# Patient Record
Sex: FEMALE | Race: WHITE | URBAN - METROPOLITAN AREA
[De-identification: names, ages, dates, MRNs, and addresses within clinical notes are randomized per-mention and may not be internally consistent; named-entity substitution may affect disease eponyms.]

---

## 2017-08-04 ENCOUNTER — OUTPATIENT (OUTPATIENT)
Dept: OUTPATIENT SERVICES | Facility: HOSPITAL | Age: 72
LOS: 1 days | Discharge: HOME | End: 2017-08-04

## 2017-08-04 DIAGNOSIS — M06.9 RHEUMATOID ARTHRITIS, UNSPECIFIED: ICD-10-CM

## 2017-08-04 DIAGNOSIS — M54.9 DORSALGIA, UNSPECIFIED: ICD-10-CM

## 2017-08-04 DIAGNOSIS — R07.9 CHEST PAIN, UNSPECIFIED: ICD-10-CM

## 2017-11-25 ENCOUNTER — EMERGENCY (EMERGENCY)
Facility: HOSPITAL | Age: 72
LOS: 0 days | Discharge: HOME | End: 2017-11-25

## 2017-11-25 DIAGNOSIS — Z88.5 ALLERGY STATUS TO NARCOTIC AGENT: ICD-10-CM

## 2017-11-25 DIAGNOSIS — Z79.899 OTHER LONG TERM (CURRENT) DRUG THERAPY: ICD-10-CM

## 2017-11-25 DIAGNOSIS — M06.9 RHEUMATOID ARTHRITIS, UNSPECIFIED: ICD-10-CM

## 2017-11-25 DIAGNOSIS — R07.9 CHEST PAIN, UNSPECIFIED: ICD-10-CM

## 2017-11-25 DIAGNOSIS — R10.84 GENERALIZED ABDOMINAL PAIN: ICD-10-CM

## 2017-11-25 DIAGNOSIS — K56.609 UNSPECIFIED INTESTINAL OBSTRUCTION, UNSPECIFIED AS TO PARTIAL VERSUS COMPLETE OBSTRUCTION: ICD-10-CM

## 2018-04-23 ENCOUNTER — LABORATORY RESULT (OUTPATIENT)
Age: 73
End: 2018-04-23

## 2018-04-23 ENCOUNTER — OUTPATIENT (OUTPATIENT)
Dept: OUTPATIENT SERVICES | Facility: HOSPITAL | Age: 73
LOS: 1 days | Discharge: HOME | End: 2018-04-23

## 2018-04-23 ENCOUNTER — APPOINTMENT (OUTPATIENT)
Dept: HEMATOLOGY ONCOLOGY | Facility: CLINIC | Age: 73
End: 2018-04-23

## 2018-04-23 VITALS
SYSTOLIC BLOOD PRESSURE: 157 MMHG | HEIGHT: 65 IN | RESPIRATION RATE: 14 BRPM | TEMPERATURE: 97.1 F | DIASTOLIC BLOOD PRESSURE: 92 MMHG | WEIGHT: 148 LBS | HEART RATE: 79 BPM | BODY MASS INDEX: 24.66 KG/M2

## 2018-04-23 DIAGNOSIS — D72.829 ELEVATED WHITE BLOOD CELL COUNT, UNSPECIFIED: ICD-10-CM

## 2018-04-23 DIAGNOSIS — Z72.0 TOBACCO USE: ICD-10-CM

## 2018-04-23 DIAGNOSIS — Z78.9 OTHER SPECIFIED HEALTH STATUS: ICD-10-CM

## 2018-04-23 LAB
HCT VFR BLD CALC: 40.5 %
HGB BLD-MCNC: 13.4 G/DL
MCHC RBC-ENTMCNC: 33 PG
MCHC RBC-ENTMCNC: 33.1 G/DL
MCV RBC AUTO: 99.8 FL
PLATELET # BLD AUTO: 168 K/UL
PMV BLD: 12.1 FL
RBC # BLD: 4.06 M/UL
RBC # FLD: 13.5 %
WBC # FLD AUTO: 9.95 K/UL

## 2018-04-24 DIAGNOSIS — D72.829 ELEVATED WHITE BLOOD CELL COUNT, UNSPECIFIED: ICD-10-CM

## 2018-04-24 LAB
APPEARANCE: ABNORMAL
BILIRUBIN URINE: NEGATIVE
BLOOD URINE: ABNORMAL
COLOR: NORMAL
ERYTHROCYTE [SEDIMENTATION RATE] IN BLOOD BY WESTERGREN METHOD: 20 MM/HR
GLUCOSE QUALITATIVE U: NEGATIVE MG/DL
KETONES URINE: NEGATIVE
LDH SERPL-CCNC: 225
LEUKOCYTE ESTERASE URINE: ABNORMAL
NITRITE URINE: POSITIVE
PH URINE: 5.5
PROTEIN URINE: ABNORMAL MG/DL
SPECIFIC GRAVITY URINE: 1.02
UROBILINOGEN URINE: 0.2 MG/DL (ref 0.2–?)

## 2018-04-24 RX ORDER — METHOTREXATE 2.5 MG/1
TABLET ORAL
Refills: 0 | Status: ACTIVE | COMMUNITY

## 2018-08-31 ENCOUNTER — APPOINTMENT (OUTPATIENT)
Dept: OTOLARYNGOLOGY | Facility: CLINIC | Age: 73
End: 2018-08-31

## 2018-11-24 ENCOUNTER — EMERGENCY (EMERGENCY)
Facility: HOSPITAL | Age: 73
LOS: 0 days | Discharge: HOME | End: 2018-11-24
Attending: EMERGENCY MEDICINE | Admitting: EMERGENCY MEDICINE

## 2018-11-24 VITALS
WEIGHT: 136.03 LBS | HEART RATE: 98 BPM | DIASTOLIC BLOOD PRESSURE: 78 MMHG | TEMPERATURE: 98 F | RESPIRATION RATE: 20 BRPM | SYSTOLIC BLOOD PRESSURE: 175 MMHG | OXYGEN SATURATION: 100 %

## 2018-11-24 VITALS
RESPIRATION RATE: 18 BRPM | HEART RATE: 80 BPM | DIASTOLIC BLOOD PRESSURE: 80 MMHG | OXYGEN SATURATION: 97 % | SYSTOLIC BLOOD PRESSURE: 150 MMHG

## 2018-11-24 DIAGNOSIS — Z90.49 ACQUIRED ABSENCE OF OTHER SPECIFIED PARTS OF DIGESTIVE TRACT: ICD-10-CM

## 2018-11-24 DIAGNOSIS — N28.1 CYST OF KIDNEY, ACQUIRED: ICD-10-CM

## 2018-11-24 DIAGNOSIS — R10.13 EPIGASTRIC PAIN: ICD-10-CM

## 2018-11-24 LAB
ALBUMIN SERPL ELPH-MCNC: 4.6 G/DL — SIGNIFICANT CHANGE UP (ref 3.5–5.2)
ALP SERPL-CCNC: 91 U/L — SIGNIFICANT CHANGE UP (ref 30–115)
ALT FLD-CCNC: 15 U/L — SIGNIFICANT CHANGE UP (ref 0–41)
ANION GAP SERPL CALC-SCNC: 14 MMOL/L — SIGNIFICANT CHANGE UP (ref 7–14)
APPEARANCE UR: CLEAR — SIGNIFICANT CHANGE UP
AST SERPL-CCNC: 17 U/L — SIGNIFICANT CHANGE UP (ref 0–41)
BACTERIA # UR AUTO: ABNORMAL
BASOPHILS # BLD AUTO: 0.04 K/UL — SIGNIFICANT CHANGE UP (ref 0–0.2)
BASOPHILS NFR BLD AUTO: 0.4 % — SIGNIFICANT CHANGE UP (ref 0–1)
BILIRUB DIRECT SERPL-MCNC: <0.2 MG/DL — SIGNIFICANT CHANGE UP (ref 0–0.2)
BILIRUB INDIRECT FLD-MCNC: SIGNIFICANT CHANGE UP MG/DL (ref 0.2–1.2)
BILIRUB SERPL-MCNC: <0.2 MG/DL — SIGNIFICANT CHANGE UP (ref 0.2–1.2)
BILIRUB UR-MCNC: NEGATIVE — SIGNIFICANT CHANGE UP
BUN SERPL-MCNC: 23 MG/DL — HIGH (ref 10–20)
CALCIUM SERPL-MCNC: 9.5 MG/DL — SIGNIFICANT CHANGE UP (ref 8.5–10.1)
CHLORIDE SERPL-SCNC: 108 MMOL/L — SIGNIFICANT CHANGE UP (ref 98–110)
CO2 SERPL-SCNC: 24 MMOL/L — SIGNIFICANT CHANGE UP (ref 17–32)
COD CRY URNS QL: NEGATIVE — SIGNIFICANT CHANGE UP
COLOR SPEC: YELLOW — SIGNIFICANT CHANGE UP
CREAT SERPL-MCNC: 1.1 MG/DL — SIGNIFICANT CHANGE UP (ref 0.7–1.5)
DIFF PNL FLD: ABNORMAL
EOSINOPHIL # BLD AUTO: 0.15 K/UL — SIGNIFICANT CHANGE UP (ref 0–0.7)
EOSINOPHIL NFR BLD AUTO: 1.4 % — SIGNIFICANT CHANGE UP (ref 0–8)
EPI CELLS # UR: ABNORMAL /HPF
GLUCOSE SERPL-MCNC: 109 MG/DL — HIGH (ref 70–99)
GLUCOSE UR QL: NEGATIVE MG/DL — SIGNIFICANT CHANGE UP
GRAN CASTS # UR COMP ASSIST: NEGATIVE — SIGNIFICANT CHANGE UP
HCT VFR BLD CALC: 40.3 % — SIGNIFICANT CHANGE UP (ref 37–47)
HGB BLD-MCNC: 13.5 G/DL — SIGNIFICANT CHANGE UP (ref 12–16)
HYALINE CASTS # UR AUTO: NEGATIVE — SIGNIFICANT CHANGE UP
IMM GRANULOCYTES NFR BLD AUTO: 0.5 % — HIGH (ref 0.1–0.3)
KETONES UR-MCNC: NEGATIVE — SIGNIFICANT CHANGE UP
LACTATE SERPL-SCNC: 1.4 MMOL/L — SIGNIFICANT CHANGE UP (ref 0.5–2.2)
LEUKOCYTE ESTERASE UR-ACNC: ABNORMAL
LIDOCAIN IGE QN: 41 U/L — SIGNIFICANT CHANGE UP (ref 7–60)
LYMPHOCYTES # BLD AUTO: 1.35 K/UL — SIGNIFICANT CHANGE UP (ref 1.2–3.4)
LYMPHOCYTES # BLD AUTO: 12.2 % — LOW (ref 20.5–51.1)
MCHC RBC-ENTMCNC: 33.5 G/DL — SIGNIFICANT CHANGE UP (ref 32–37)
MCHC RBC-ENTMCNC: 33.5 PG — HIGH (ref 27–31)
MCV RBC AUTO: 100 FL — HIGH (ref 81–99)
MONOCYTES # BLD AUTO: 0.44 K/UL — SIGNIFICANT CHANGE UP (ref 0.1–0.6)
MONOCYTES NFR BLD AUTO: 4 % — SIGNIFICANT CHANGE UP (ref 1.7–9.3)
NEUTROPHILS # BLD AUTO: 9.02 K/UL — HIGH (ref 1.4–6.5)
NEUTROPHILS NFR BLD AUTO: 81.5 % — HIGH (ref 42.2–75.2)
NITRITE UR-MCNC: NEGATIVE — SIGNIFICANT CHANGE UP
NRBC # BLD: 0 /100 WBCS — SIGNIFICANT CHANGE UP (ref 0–0)
PH UR: 6 — SIGNIFICANT CHANGE UP (ref 5–8)
PLATELET # BLD AUTO: 176 K/UL — SIGNIFICANT CHANGE UP (ref 130–400)
POTASSIUM SERPL-MCNC: 4.2 MMOL/L — SIGNIFICANT CHANGE UP (ref 3.5–5)
POTASSIUM SERPL-SCNC: 4.2 MMOL/L — SIGNIFICANT CHANGE UP (ref 3.5–5)
PROT SERPL-MCNC: 7 G/DL — SIGNIFICANT CHANGE UP (ref 6–8)
PROT UR-MCNC: NEGATIVE MG/DL — SIGNIFICANT CHANGE UP
RBC # BLD: 4.03 M/UL — LOW (ref 4.2–5.4)
RBC # FLD: 13.2 % — SIGNIFICANT CHANGE UP (ref 11.5–14.5)
RBC CASTS # UR COMP ASSIST: ABNORMAL /HPF
SODIUM SERPL-SCNC: 146 MMOL/L — SIGNIFICANT CHANGE UP (ref 135–146)
SP GR SPEC: 1.01 — SIGNIFICANT CHANGE UP (ref 1.01–1.03)
TRI-PHOS CRY UR QL COMP ASSIST: NEGATIVE — SIGNIFICANT CHANGE UP
URATE CRY FLD QL MICRO: NEGATIVE — SIGNIFICANT CHANGE UP
UROBILINOGEN FLD QL: 0.2 MG/DL — SIGNIFICANT CHANGE UP (ref 0.2–0.2)
WBC # BLD: 11.05 K/UL — HIGH (ref 4.8–10.8)
WBC # FLD AUTO: 11.05 K/UL — HIGH (ref 4.8–10.8)
WBC UR QL: SIGNIFICANT CHANGE UP /HPF

## 2018-11-24 RX ORDER — KETOROLAC TROMETHAMINE 30 MG/ML
15 SYRINGE (ML) INJECTION ONCE
Qty: 0 | Refills: 0 | Status: DISCONTINUED | OUTPATIENT
Start: 2018-11-24 | End: 2018-11-24

## 2018-11-24 RX ORDER — ACETAMINOPHEN 500 MG
975 TABLET ORAL ONCE
Qty: 0 | Refills: 0 | Status: COMPLETED | OUTPATIENT
Start: 2018-11-24 | End: 2018-11-24

## 2018-11-24 RX ORDER — IBUPROFEN 200 MG
600 TABLET ORAL ONCE
Qty: 0 | Refills: 0 | Status: COMPLETED | OUTPATIENT
Start: 2018-11-24 | End: 2018-11-24

## 2018-11-24 RX ADMIN — Medication 15 MILLIGRAM(S): at 03:27

## 2018-11-24 RX ADMIN — Medication 600 MILLIGRAM(S): at 05:17

## 2018-11-24 RX ADMIN — Medication 975 MILLIGRAM(S): at 05:17

## 2018-11-24 NOTE — ED ADULT NURSE NOTE - NSIMPLEMENTINTERV_GEN_ALL_ED
Implemented All Universal Safety Interventions:  [place] to call system. Call bell, personal items and telephone within reach. Instruct patient to call for assistance. Room bathroom lighting operational. Non-slip footwear when patient is off stretcher. Physically safe environment: no spills, clutter or unnecessary equipment. Stretcher in lowest position, wheels locked, appropriate side rails in place.

## 2018-11-24 NOTE — ED PROVIDER NOTE - PHYSICAL EXAMINATION
CONSTITUTIONAL: Well-appearing; well-nourished; in no apparent distress.   NECK: Supple; non-tender; no cervical lymphadenopathy. No JVD.   CARDIOVASCULAR: Normal S1, S2; no murmurs, rubs, or gallops.   RESPIRATORY: Normal chest excursion with respiration; breath sounds clear and equal bilaterally; no wheezes, rhonchi, or rales.  GI/: Normal bowel sounds; non-distended; non-tender; no palpable organomegaly.   MS: + right sided CVA ttp. No evidence of trauma or deformity. Normal ROM in all four extremities; non-tender to palpation  SKIN: Normal for age and race; warm; dry; good turgor; no apparent lesions or exudate.   NEURO/PSYCH: A & O x 4; grossly unremarkable. mood and manner are appropriate. Grooming and personal hygiene are appropriate.

## 2018-11-24 NOTE — ED PROVIDER NOTE - MEDICAL DECISION MAKING DETAILS
Patient has r sided kidney mass on CT. Patient given urology follow-up to [facility] an appointment to be seen next week for further work-up of hematuria. Patient agreed with outpatient treatment plan. I have full discussed the medical management and delivery of care with the patient. Patient confirms understanding and has been given detailed return precautions. Patient instructed to return to the ED should symptoms persist or worsen. Patient is well appearing upon discharge.

## 2018-11-24 NOTE — ED PROVIDER NOTE - OBJECTIVE STATEMENT
73 year old female with pmhx of RA on methotrexate, cholecystectomy c/o right flank pain radiating to RUQ/epigastric pain and hematuria x 3 days. + weight loss 7 lbs over 3 months. No n/v, fever/chills, dysuria, diarrhea, chest pain, sob, rectal bleeding.

## 2018-11-24 NOTE — ED PROVIDER NOTE - NS ED ROS FT
Constitutional: no fever, chills, no recent weight loss, change in appetite or malaise  Cardiac: No chest pain, SOB or edema.  Respiratory: No cough or respiratory distress  GI: + right flank pain radiating to ruq. No n/v/d  : + hematuria. No dysuria, frequency, urgency   MS: no pain to back or extremities, no loss of ROM, no weakness  Neuro: No headache or weakness. No LOC.  Skin: No skin rash.  Except as documented in the HPI, all other systems are negative.

## 2018-11-24 NOTE — ED PROVIDER NOTE - ATTENDING CONTRIBUTION TO CARE
I personally evaluated the patient. I reviewed the Resident’s or Physician Assistant’s note (as assigned above), and agree with the findings and plan except as documented in my note.    73 year old female with pmhx of RA on methotrexate presents with flank pain and lower abd pain associated with hematuria over the past 2 days. No fevers, chills. No n/v. No diarrhea. No bloody stools.     CONSTITUTIONAL: Well-developed; well-nourished; in no acute distress. Sitting up and providing appropriate history and physical examination  SKIN: skin exam is warm and dry, no acute rash.  HEAD: Normocephalic; atraumatic.  EYES: PERRL, 3 mm bilateral, no nystagmus, EOM intact; conjunctiva and sclera clear.  ENT: No nasal discharge; airway clear.  NECK: Supple; non tender.+ full passive ROM in all directions. No JVD  CARD: S1, S2 normal; no murmurs, gallops, or rubs. Regular rate and rhythm. + Symmetric Strong Pulses  RESP: No wheezes, rales or rhonchi. Good air movement bilaterally  ABD: soft; non-distended; non-tender. No Rebound, No Gaurding, No signs of peritnitis, No CVA tenderness  EXT: Normal ROM. No clubbing, cyanosis or edema. Dp and Pt Pulses intact. Cap refill less than 3 seconds  NEURO: CN 2-12 intact, normal finger to nose, normal romberg, stable gait, no sensory or motor deficits, Alert, oriented, grossly unremarkable. No Focal deficits. GCS 15. NIH 0  PSYCH: Cooperative, appropriate.    Plan: labs, UA, CT abd/pelvis

## 2018-12-10 ENCOUNTER — OUTPATIENT (OUTPATIENT)
Dept: OUTPATIENT SERVICES | Facility: HOSPITAL | Age: 73
LOS: 1 days | Discharge: HOME | End: 2018-12-10

## 2018-12-10 VITALS
HEIGHT: 65 IN | DIASTOLIC BLOOD PRESSURE: 58 MMHG | OXYGEN SATURATION: 97 % | RESPIRATION RATE: 15 BRPM | WEIGHT: 139.33 LBS | SYSTOLIC BLOOD PRESSURE: 104 MMHG | TEMPERATURE: 97 F | HEART RATE: 60 BPM

## 2018-12-10 DIAGNOSIS — Z01.818 ENCOUNTER FOR OTHER PREPROCEDURAL EXAMINATION: ICD-10-CM

## 2018-12-10 DIAGNOSIS — M06.9 RHEUMATOID ARTHRITIS, UNSPECIFIED: ICD-10-CM

## 2018-12-10 DIAGNOSIS — C67.9 MALIGNANT NEOPLASM OF BLADDER, UNSPECIFIED: ICD-10-CM

## 2018-12-10 DIAGNOSIS — N83.209 UNSPECIFIED OVARIAN CYST, UNSPECIFIED SIDE: Chronic | ICD-10-CM

## 2018-12-10 DIAGNOSIS — Z96.641 PRESENCE OF RIGHT ARTIFICIAL HIP JOINT: Chronic | ICD-10-CM

## 2018-12-10 DIAGNOSIS — Z90.49 ACQUIRED ABSENCE OF OTHER SPECIFIED PARTS OF DIGESTIVE TRACT: Chronic | ICD-10-CM

## 2018-12-10 DIAGNOSIS — Z98.41 CATARACT EXTRACTION STATUS, RIGHT EYE: Chronic | ICD-10-CM

## 2018-12-10 LAB
ALBUMIN SERPL ELPH-MCNC: 3.8 G/DL — SIGNIFICANT CHANGE UP (ref 3.5–5.2)
ALP SERPL-CCNC: 80 U/L — SIGNIFICANT CHANGE UP (ref 30–115)
ALT FLD-CCNC: 26 U/L — SIGNIFICANT CHANGE UP (ref 0–41)
ANION GAP SERPL CALC-SCNC: 19 MMOL/L — HIGH (ref 7–14)
APPEARANCE UR: ABNORMAL
APTT BLD: 28.2 SEC — SIGNIFICANT CHANGE UP (ref 27–39.2)
AST SERPL-CCNC: 17 U/L — SIGNIFICANT CHANGE UP (ref 0–41)
BACTERIA # UR AUTO: ABNORMAL /HPF
BILIRUB SERPL-MCNC: 0.7 MG/DL — SIGNIFICANT CHANGE UP (ref 0.2–1.2)
BILIRUB UR-MCNC: ABNORMAL
BUN SERPL-MCNC: 19 MG/DL — SIGNIFICANT CHANGE UP (ref 10–20)
CALCIUM SERPL-MCNC: 9.2 MG/DL — SIGNIFICANT CHANGE UP (ref 8.5–10.1)
CHLORIDE SERPL-SCNC: 98 MMOL/L — SIGNIFICANT CHANGE UP (ref 98–110)
CO2 SERPL-SCNC: 24 MMOL/L — SIGNIFICANT CHANGE UP (ref 17–32)
COLOR SPEC: SIGNIFICANT CHANGE UP
CREAT SERPL-MCNC: 1.4 MG/DL — SIGNIFICANT CHANGE UP (ref 0.7–1.5)
DIFF PNL FLD: ABNORMAL
EPI CELLS # UR: ABNORMAL /HPF
GLUCOSE SERPL-MCNC: 130 MG/DL — HIGH (ref 70–99)
GLUCOSE UR QL: NEGATIVE MG/DL — SIGNIFICANT CHANGE UP
HCT VFR BLD CALC: 35 % — LOW (ref 37–47)
HGB BLD-MCNC: 11.6 G/DL — LOW (ref 12–16)
INR BLD: 1.25 RATIO — SIGNIFICANT CHANGE UP (ref 0.65–1.3)
KETONES UR-MCNC: ABNORMAL
LEUKOCYTE ESTERASE UR-ACNC: ABNORMAL
MCHC RBC-ENTMCNC: 32.1 PG — HIGH (ref 27–31)
MCHC RBC-ENTMCNC: 33.1 G/DL — SIGNIFICANT CHANGE UP (ref 32–37)
MCV RBC AUTO: 97 FL — SIGNIFICANT CHANGE UP (ref 81–99)
NITRITE UR-MCNC: NEGATIVE — SIGNIFICANT CHANGE UP
NRBC # BLD: 0 /100 WBCS — SIGNIFICANT CHANGE UP (ref 0–0)
PH UR: 6 — SIGNIFICANT CHANGE UP (ref 5–8)
PLATELET # BLD AUTO: 132 K/UL — SIGNIFICANT CHANGE UP (ref 130–400)
POTASSIUM SERPL-MCNC: 3.4 MMOL/L — LOW (ref 3.5–5)
POTASSIUM SERPL-SCNC: 3.4 MMOL/L — LOW (ref 3.5–5)
PROT SERPL-MCNC: 6.2 G/DL — SIGNIFICANT CHANGE UP (ref 6–8)
PROT UR-MCNC: 100 MG/DL
PROTHROM AB SERPL-ACNC: 14.4 SEC — HIGH (ref 9.95–12.87)
RBC # BLD: 3.61 M/UL — LOW (ref 4.2–5.4)
RBC # FLD: 13.2 % — SIGNIFICANT CHANGE UP (ref 11.5–14.5)
RBC CASTS # UR COMP ASSIST: ABNORMAL /HPF
SODIUM SERPL-SCNC: 141 MMOL/L — SIGNIFICANT CHANGE UP (ref 135–146)
SP GR SPEC: 1.02 — SIGNIFICANT CHANGE UP (ref 1.01–1.03)
UROBILINOGEN FLD QL: 1 MG/DL (ref 0.2–0.2)
WBC # BLD: 14.89 K/UL — HIGH (ref 4.8–10.8)
WBC # FLD AUTO: 14.89 K/UL — HIGH (ref 4.8–10.8)
WBC UR QL: >50 /HPF

## 2018-12-10 NOTE — H&P PST ADULT - NSANTHOSAYNRD_GEN_A_CORE
No. CARL screening performed.  STOP BANG Legend: 0-2 = LOW Risk; 3-4 = INTERMEDIATE Risk; 5-8 = HIGH Risk/SEE CARL TOOL

## 2018-12-10 NOTE — H&P PST ADULT - ATTENDING COMMENTS
pt with gross hematuria admitted for cysto bladder biopsy fulguration pt explained risks of procedure including bleeding infection bladder perforation for or today

## 2018-12-10 NOTE — H&P PST ADULT - HISTORY OF PRESENT ILLNESS
73 YEAR OLD FEMALE WITH A SMALL AREA IN BLADDER FOUND ON CYSTOSCOPE AFTER GOING TO THE DOCTOR WITH BLEEDING  FOS=2  DENIES CHEST PAIN SOB PALP  DENIES RECENT URI OR UTI

## 2018-12-10 NOTE — H&P PST ADULT - PSH
Benign ovarian cyst  RIGHT OVARY REMOVED  H/O bilateral cataract extraction    History of appendectomy    History of cholecystectomy    History of hip replacement, total, right  1998

## 2018-12-17 ENCOUNTER — RESULT REVIEW (OUTPATIENT)
Age: 73
End: 2018-12-17

## 2018-12-17 ENCOUNTER — OUTPATIENT (OUTPATIENT)
Dept: OUTPATIENT SERVICES | Facility: HOSPITAL | Age: 73
LOS: 1 days | Discharge: HOME | End: 2018-12-17

## 2018-12-17 VITALS
DIASTOLIC BLOOD PRESSURE: 65 MMHG | SYSTOLIC BLOOD PRESSURE: 140 MMHG | HEART RATE: 70 BPM | RESPIRATION RATE: 16 BRPM | TEMPERATURE: 98 F | WEIGHT: 138.01 LBS | HEIGHT: 65 IN

## 2018-12-17 VITALS
HEART RATE: 74 BPM | OXYGEN SATURATION: 97 % | RESPIRATION RATE: 18 BRPM | DIASTOLIC BLOOD PRESSURE: 80 MMHG | SYSTOLIC BLOOD PRESSURE: 145 MMHG

## 2018-12-17 DIAGNOSIS — Z96.641 PRESENCE OF RIGHT ARTIFICIAL HIP JOINT: Chronic | ICD-10-CM

## 2018-12-17 DIAGNOSIS — Z90.49 ACQUIRED ABSENCE OF OTHER SPECIFIED PARTS OF DIGESTIVE TRACT: Chronic | ICD-10-CM

## 2018-12-17 DIAGNOSIS — N83.209 UNSPECIFIED OVARIAN CYST, UNSPECIFIED SIDE: Chronic | ICD-10-CM

## 2018-12-17 DIAGNOSIS — Z98.41 CATARACT EXTRACTION STATUS, RIGHT EYE: Chronic | ICD-10-CM

## 2018-12-17 RX ORDER — OXYCODONE AND ACETAMINOPHEN 5; 325 MG/1; MG/1
2 TABLET ORAL ONCE
Qty: 0 | Refills: 0 | Status: DISCONTINUED | OUTPATIENT
Start: 2018-12-17 | End: 2018-12-17

## 2018-12-17 RX ORDER — HYDROMORPHONE HYDROCHLORIDE 2 MG/ML
0.5 INJECTION INTRAMUSCULAR; INTRAVENOUS; SUBCUTANEOUS
Qty: 0 | Refills: 0 | Status: DISCONTINUED | OUTPATIENT
Start: 2018-12-17 | End: 2018-12-17

## 2018-12-17 RX ORDER — SODIUM CHLORIDE 9 MG/ML
1000 INJECTION, SOLUTION INTRAVENOUS
Qty: 0 | Refills: 0 | Status: DISCONTINUED | OUTPATIENT
Start: 2018-12-17 | End: 2018-12-17

## 2018-12-17 RX ORDER — HYDROMORPHONE HYDROCHLORIDE 2 MG/ML
1 INJECTION INTRAMUSCULAR; INTRAVENOUS; SUBCUTANEOUS
Qty: 0 | Refills: 0 | Status: DISCONTINUED | OUTPATIENT
Start: 2018-12-17 | End: 2018-12-17

## 2018-12-17 RX ORDER — ONDANSETRON 8 MG/1
4 TABLET, FILM COATED ORAL ONCE
Qty: 0 | Refills: 0 | Status: DISCONTINUED | OUTPATIENT
Start: 2018-12-17 | End: 2018-12-17

## 2018-12-17 RX ADMIN — SODIUM CHLORIDE 125 MILLILITER(S): 9 INJECTION, SOLUTION INTRAVENOUS at 14:52

## 2018-12-17 NOTE — PRE-ANESTHESIA EVALUATION ADULT - NSANTHADDINFOFT_GEN_ALL_CORE
74 y/o WF evaluated for cystoscopy.  ASA 3.  Plan GA.  Plan, benefits, foreseeable risks, viable alternatives discussed with patient and all her pertinent questions answered and she understands and elects to proceed.

## 2018-12-17 NOTE — BRIEF OPERATIVE NOTE - PROCEDURE
<<-----Click on this checkbox to enter Procedure Cystoscopy  12/17/2018  bladder biopsies fulguration  Active  [id number]

## 2018-12-26 LAB — SURGICAL PATHOLOGY STUDY: SIGNIFICANT CHANGE UP

## 2018-12-27 DIAGNOSIS — D49.4 NEOPLASM OF UNSPECIFIED BEHAVIOR OF BLADDER: ICD-10-CM

## 2018-12-27 DIAGNOSIS — F17.210 NICOTINE DEPENDENCE, CIGARETTES, UNCOMPLICATED: ICD-10-CM

## 2018-12-27 DIAGNOSIS — C67.9 MALIGNANT NEOPLASM OF BLADDER, UNSPECIFIED: ICD-10-CM

## 2019-08-24 ENCOUNTER — OUTPATIENT (OUTPATIENT)
Dept: OUTPATIENT SERVICES | Facility: HOSPITAL | Age: 74
LOS: 1 days | Discharge: HOME | End: 2019-08-24
Payer: MEDICARE

## 2019-08-24 DIAGNOSIS — M79.671 PAIN IN RIGHT FOOT: ICD-10-CM

## 2019-08-24 DIAGNOSIS — Z90.49 ACQUIRED ABSENCE OF OTHER SPECIFIED PARTS OF DIGESTIVE TRACT: Chronic | ICD-10-CM

## 2019-08-24 DIAGNOSIS — N83.209 UNSPECIFIED OVARIAN CYST, UNSPECIFIED SIDE: Chronic | ICD-10-CM

## 2019-08-24 DIAGNOSIS — Z98.41 CATARACT EXTRACTION STATUS, RIGHT EYE: Chronic | ICD-10-CM

## 2019-08-24 DIAGNOSIS — Z96.641 PRESENCE OF RIGHT ARTIFICIAL HIP JOINT: Chronic | ICD-10-CM

## 2019-08-24 PROBLEM — M06.9 RHEUMATOID ARTHRITIS, UNSPECIFIED: Chronic | Status: ACTIVE | Noted: 2018-12-10

## 2019-08-24 PROCEDURE — 73630 X-RAY EXAM OF FOOT: CPT | Mod: 26,RT

## 2021-05-06 ENCOUNTER — INPATIENT (INPATIENT)
Facility: HOSPITAL | Age: 76
LOS: 0 days | Discharge: HOME | End: 2021-05-07
Attending: INTERNAL MEDICINE | Admitting: INTERNAL MEDICINE
Payer: MEDICARE

## 2021-05-06 VITALS
HEIGHT: 65 IN | SYSTOLIC BLOOD PRESSURE: 147 MMHG | DIASTOLIC BLOOD PRESSURE: 67 MMHG | TEMPERATURE: 97 F | WEIGHT: 141.98 LBS | HEART RATE: 93 BPM | OXYGEN SATURATION: 99 % | RESPIRATION RATE: 20 BRPM

## 2021-05-06 DIAGNOSIS — Z90.49 ACQUIRED ABSENCE OF OTHER SPECIFIED PARTS OF DIGESTIVE TRACT: Chronic | ICD-10-CM

## 2021-05-06 DIAGNOSIS — N83.209 UNSPECIFIED OVARIAN CYST, UNSPECIFIED SIDE: Chronic | ICD-10-CM

## 2021-05-06 DIAGNOSIS — Z98.41 CATARACT EXTRACTION STATUS, RIGHT EYE: Chronic | ICD-10-CM

## 2021-05-06 DIAGNOSIS — Z96.641 PRESENCE OF RIGHT ARTIFICIAL HIP JOINT: Chronic | ICD-10-CM

## 2021-05-06 LAB
ALBUMIN SERPL ELPH-MCNC: 4.4 G/DL — SIGNIFICANT CHANGE UP (ref 3.5–5.2)
ALP SERPL-CCNC: 81 U/L — SIGNIFICANT CHANGE UP (ref 30–115)
ALT FLD-CCNC: 14 U/L — SIGNIFICANT CHANGE UP (ref 0–41)
ANION GAP SERPL CALC-SCNC: 10 MMOL/L — SIGNIFICANT CHANGE UP (ref 7–14)
AST SERPL-CCNC: 19 U/L — SIGNIFICANT CHANGE UP (ref 0–41)
BASOPHILS # BLD AUTO: 0.02 K/UL — SIGNIFICANT CHANGE UP (ref 0–0.2)
BASOPHILS NFR BLD AUTO: 0.3 % — SIGNIFICANT CHANGE UP (ref 0–1)
BILIRUB SERPL-MCNC: 0.2 MG/DL — SIGNIFICANT CHANGE UP (ref 0.2–1.2)
BUN SERPL-MCNC: 22 MG/DL — HIGH (ref 10–20)
CALCIUM SERPL-MCNC: 9.8 MG/DL — SIGNIFICANT CHANGE UP (ref 8.5–10.1)
CHLORIDE SERPL-SCNC: 109 MMOL/L — SIGNIFICANT CHANGE UP (ref 98–110)
CO2 SERPL-SCNC: 26 MMOL/L — SIGNIFICANT CHANGE UP (ref 17–32)
CREAT SERPL-MCNC: 1.2 MG/DL — SIGNIFICANT CHANGE UP (ref 0.7–1.5)
EOSINOPHIL # BLD AUTO: 0.13 K/UL — SIGNIFICANT CHANGE UP (ref 0–0.7)
EOSINOPHIL NFR BLD AUTO: 1.7 % — SIGNIFICANT CHANGE UP (ref 0–8)
GLUCOSE SERPL-MCNC: 110 MG/DL — HIGH (ref 70–99)
HCT VFR BLD CALC: 35.7 % — LOW (ref 37–47)
HGB BLD-MCNC: 12.1 G/DL — SIGNIFICANT CHANGE UP (ref 12–16)
IMM GRANULOCYTES NFR BLD AUTO: 0.3 % — SIGNIFICANT CHANGE UP (ref 0.1–0.3)
LYMPHOCYTES # BLD AUTO: 1.42 K/UL — SIGNIFICANT CHANGE UP (ref 1.2–3.4)
LYMPHOCYTES # BLD AUTO: 19.1 % — LOW (ref 20.5–51.1)
MCHC RBC-ENTMCNC: 33.5 PG — HIGH (ref 27–31)
MCHC RBC-ENTMCNC: 33.9 G/DL — SIGNIFICANT CHANGE UP (ref 32–37)
MCV RBC AUTO: 98.9 FL — SIGNIFICANT CHANGE UP (ref 81–99)
MONOCYTES # BLD AUTO: 0.38 K/UL — SIGNIFICANT CHANGE UP (ref 0.1–0.6)
MONOCYTES NFR BLD AUTO: 5.1 % — SIGNIFICANT CHANGE UP (ref 1.7–9.3)
NEUTROPHILS # BLD AUTO: 5.48 K/UL — SIGNIFICANT CHANGE UP (ref 1.4–6.5)
NEUTROPHILS NFR BLD AUTO: 73.5 % — SIGNIFICANT CHANGE UP (ref 42.2–75.2)
NRBC # BLD: 0 /100 WBCS — SIGNIFICANT CHANGE UP (ref 0–0)
PLATELET # BLD AUTO: 165 K/UL — SIGNIFICANT CHANGE UP (ref 130–400)
POTASSIUM SERPL-MCNC: 3.9 MMOL/L — SIGNIFICANT CHANGE UP (ref 3.5–5)
POTASSIUM SERPL-SCNC: 3.9 MMOL/L — SIGNIFICANT CHANGE UP (ref 3.5–5)
PROT SERPL-MCNC: 6.8 G/DL — SIGNIFICANT CHANGE UP (ref 6–8)
RAPID RVP RESULT: SIGNIFICANT CHANGE UP
RBC # BLD: 3.61 M/UL — LOW (ref 4.2–5.4)
RBC # FLD: 13.2 % — SIGNIFICANT CHANGE UP (ref 11.5–14.5)
SARS-COV-2 RNA SPEC QL NAA+PROBE: SIGNIFICANT CHANGE UP
SODIUM SERPL-SCNC: 145 MMOL/L — SIGNIFICANT CHANGE UP (ref 135–146)
TROPONIN T SERPL-MCNC: <0.01 NG/ML — SIGNIFICANT CHANGE UP
WBC # BLD: 7.45 K/UL — SIGNIFICANT CHANGE UP (ref 4.8–10.8)
WBC # FLD AUTO: 7.45 K/UL — SIGNIFICANT CHANGE UP (ref 4.8–10.8)

## 2021-05-06 PROCEDURE — 71046 X-RAY EXAM CHEST 2 VIEWS: CPT | Mod: 26

## 2021-05-06 PROCEDURE — 99223 1ST HOSP IP/OBS HIGH 75: CPT

## 2021-05-06 PROCEDURE — 99285 EMERGENCY DEPT VISIT HI MDM: CPT

## 2021-05-06 NOTE — H&P ADULT - NSICDXPASTSURGICALHX_GEN_ALL_CORE_FT
PAST SURGICAL HISTORY:  Benign ovarian cyst RIGHT OVARY REMOVED    H/O bilateral cataract extraction     History of appendectomy     History of cholecystectomy     History of hip replacement, total, right 1998

## 2021-05-06 NOTE — H&P ADULT - ASSESSMENT
yr old female with hx of anxiety presented to ER for chest pain     # Atypical Chest Pain   - symptoms resolved   - trop <0.01 --> f/u AM trop  - EKG: NSR  - tele monitoring   - cardiology f/u    - MTX    # DVT ppx  - start Lovenox     # DASH diet    # Ambulate as tolerated    # Full code 76 yr old female with hx of RA (on MTX), GERD, gastric ulcer  presented to ER for chest pain since yesterday.    # Atypical Chest Pain   - likely dyspepsia; r/o ACS  - asymptomatic at encounter  - pt reports having neg stress test 5 yrs ago with Dr. Zimmerman   - trop <0.01 --> f/u AM trop  - EKG: NSR with PVC  - tele monitoring   - consult cardiology    # Hx of GERD / Gastric Ulcer   - pt not on any GI ppx - takes ibuprofen for RA pain   - start Protonix 40mg daily   - outpt GI f/u Dr. Barnard     # Hx of RA  - c/w MTX  - avoid NSAID for pain   - Tylenol / tramadol prn     # DVT ppx  - start Lovenox     # DASH diet    # Ambulate as tolerated    # Full code 76 yr old female with hx of RA (on MTX), GERD, gastric ulcer  presented to ER for chest pain since yesterday.    # Atypical Chest Pain   - likely dyspepsia; r/o ACS  - asymptomatic at encounter  - pt reports having neg stress test 5 yrs ago with Dr. Zimmerman   - trop <0.01 --> f/u AM trop  - EKG: NSR with PVC  - tele monitoring   - consult cardiology    # Hx of GERD / Gastric Ulcer   - pt not on any GI ppx - takes ibuprofen for RA pain   - start Protonix 40mg daily   - outpt GI f/u Dr. Barnard     # Hx of RA  - c/w MTX  - avoid NSAID for pain   - Tylenol / tramadol prn     # DVT ppx  - start Lovenox     # DASH diet    # Ambulate as tolerated    # Anticipated for discharge     # Full code

## 2021-05-06 NOTE — ED PROVIDER NOTE - ATTENDING CONTRIBUTION TO CARE
I was present for and supervised the key and critical aspects of the procedures performed during the care of the patient. Patient presents for evaluation of chest pain that is moderate and burning in nature with no radiation to the back she denies any fevers or chills she denies any diaphoresis she denies any vomiting. pain initiated at rest.  she had a similar episode 1 month prior that resolved on its own  on physical exam the patient is nc/at perrla eomi oropharynx clear cta b/l, rrr s1s2 noted abd-soft nt nd bs+ ext from with no edema noted   A/P- we obtained ekg labs including troponin which is negative nevertheless given the patients age and history I will admi for further management at this time

## 2021-05-06 NOTE — H&P ADULT - NSICDXFAMILYHX_GEN_ALL_CORE_FT
FAMILY HISTORY:  No pertinent family history in first degree relatives     FAMILY HISTORY:  Father  Still living? Unknown  FHx: pancreatic cancer, Age at diagnosis: Age Unknown

## 2021-05-06 NOTE — ED PROVIDER NOTE - CLINICAL SUMMARY MEDICAL DECISION MAKING FREE TEXT BOX
patient presents for evaluation of chest pain at rest with no sob and no vomiting no diaphoresis we obtained ekg labs including troponin which is negative   I will admit for further management at this time

## 2021-05-06 NOTE — ED PROVIDER NOTE - OBJECTIVE STATEMENT
75 y/o female with hx of RA on methotrexate presents to the ED with intermittent epigastric pain radiating to chest today. patient noted symptoms a few weeks ago which spontaneously resolved. patient denies any diaphoresis , nausea , sob . no fever,chills. patient denies any back pain. no tingling to extremities. patietn with steady gait. no syncope. patient denies any palpitations. patient witnout any prior cardiac history in past.

## 2021-05-06 NOTE — ED PROVIDER NOTE - PHYSICAL EXAMINATION
Vital Signs: I have reviewed the initial vital signs.  Constitutional: well-nourished, no acute distress, normocephalic  Eyes: PERRLA, EOMI, clear conjunctiva  ENT: MMM,   Cardiovascular: regular rate, regular rhythm, no murmur appreciated  Respiratory: unlabored respiratory effort, clear to auscultation bilaterally, no chest wall tenderness  Gastrointestinal: soft, non-tender, non-distended  abdomen, no pulsatile mass  Musculoskeletal: supple neck, no lower extremity edema, no bony tenderness  Integumentary: warm, dry, no rash  Neurologic: awake, alert, cranial nerves II-XII grossly intact, extremities’ motor and sensory functions grossly intact, no focal deficits,

## 2021-05-06 NOTE — ED PROVIDER NOTE - NS ED ROS FT
Review of Systems    Constitutional: (-) fever/ chills (-)loss of appetite   Eyes (-) visual changes  ENT: (-) epistaxis (-) sore throat (-) ear pain  Cardiovascular: (+) chest pain, (-) syncope (-) palpitations  Respiratory: (-) cough, (-) shortness of breath  Gastrointestinal: (-) vomiting, (-) diarrhea (-) abdominal pain  neck: (-) neck pain or stiffness  Musculoskeletal:  (-) back pain, (-) joint pain   Integumentary: (-) rash, (-) swelling  Neurological: (-) headache, (-) altered mental status

## 2021-05-06 NOTE — H&P ADULT - NSHPPHYSICALEXAM_GEN_ALL_CORE
T(C): 35.9 (05-06-21 @ 20:09), Max: 35.9 (05-06-21 @ 20:09)  HR: 88 (05-06-21 @ 22:18) (88 - 93)  BP: 142/63 (05-06-21 @ 22:18) (142/63 - 147/67)  RR: 18 (05-06-21 @ 22:18) (18 - 20)  SpO2: 100% (05-06-21 @ 22:18) (99% - 100%)        GENERAL: NAD, well-developed  HEAD:  Atraumatic, Normocephalic  EYES: EOMI, PERRLA, conjunctiva and sclera clear  ENT: Normal tympanic membrane. No nasal obstruction or discharge. No tonsillar exudate, swelling or erythema.  NECK: Supple, No JVD  CHEST/LUNG: Clear to auscultation bilaterally; No wheeze  HEART: Regular rate and rhythm; No murmurs, rubs, or gallops  ABDOMEN: Soft, Nontender, Nondistended; Bowel sounds present  EXTREMITIES:  2+ Peripheral Pulses, No clubbing, cyanosis, or edema  PSYCH: AAOx3  NEUROLOGY: non-focal  SKIN: No rashes or lesions T(C): 35.9 (05-06-21 @ 20:09), Max: 35.9 (05-06-21 @ 20:09)  HR: 88 (05-06-21 @ 22:18) (88 - 93)  BP: 142/63 (05-06-21 @ 22:18) (142/63 - 147/67)  RR: 18 (05-06-21 @ 22:18) (18 - 20)  SpO2: 100% (05-06-21 @ 22:18) (99% - 100%)    GENERAL: NAD, well-developed  HEAD:  Atraumatic, Normocephalic  EYES: EOMI, PERRLA, conjunctiva and sclera clear  ENT: Normal tympanic membrane. No nasal obstruction or discharge. No tonsillar exudate, swelling or erythema.  NECK: Supple, No JVD  CHEST/LUNG: Clear to auscultation bilaterally; No wheeze  HEART: Regular rate and rhythm; No murmurs, rubs, or gallops  ABDOMEN: Soft, Nontender, Nondistended; Bowel sounds present  EXTREMITIES:  2+ Peripheral Pulses, No clubbing, cyanosis, or edema  PSYCH: AAOx3  NEUROLOGY: non-focal  SKIN: No rashes or lesions

## 2021-05-06 NOTE — H&P ADULT - HISTORY OF PRESENT ILLNESS
IN PROGRESS    76 yr old female with hx of    presented to ER for chest pain     As per patient she suddenly woke up at 2 AM today with palpitation associated with lightheadedness and numbness and tingling of head and upper chest. She also complained of chest pressure and sob during the event.  took vitals at home: bp: 180/103, HR . Her symptoms persisted for 15 mins and resolved on its own. She never had similar symptoms before. Patient can ambulate >2 blocks with no issues. She denies any fever, chills, chest pain, abdominal pain. Lives home with family, social negx3.  76 yr old female with hx of RA (on MTX), GERD, gastric ulcer  presented to ER for chest pain since yesterday. As per patient she suddenly woke up at 1 AM today with central chest pain, continuous non radiating, pain fluctuating from 5 to 9/10 with no aggravating or relieving factors. Her pain persisted throught the night and today all day. Patient reports paitn was worse after haing dinner tonight so presented to ER. She had similar symptoms 1 month before that resolved on its own. Patient can ambulate >3 blocks or climb 2 flights of stairs with no issues. She denies any fever, chills, palpitaion, abdominal pain. Lives home with family, social negx3.

## 2021-05-06 NOTE — H&P ADULT - NSHPSOCIALHISTORY_GEN_ALL_CORE
Marital Status:  (   )    (   ) Single    (   )    (  )   Lives with: (  ) alone  (  ) children   (  ) spouse   (  ) parents  (  ) other  Recent Travel: No recent travel  Occupation:    Substance Use (street drugs): ( x ) never used  (  ) other:  Tobacco Usage:  ( x  ) never smoked   (   ) former smoker   (   ) current smoker  (     ) pack year  Alcohol Usage: None Substance Use (street drugs): ( x ) never used  (  ) other:  Tobacco Usage:  (   ) never smoked   ( X  ) former smoker   (   ) current smoker  (     ) pack year: quit 3 yrs ago - 1 pack a day for 50 yrs   Alcohol Usage: None

## 2021-05-07 ENCOUNTER — TRANSCRIPTION ENCOUNTER (OUTPATIENT)
Age: 76
End: 2021-05-07

## 2021-05-07 VITALS
SYSTOLIC BLOOD PRESSURE: 120 MMHG | RESPIRATION RATE: 16 BRPM | TEMPERATURE: 97 F | DIASTOLIC BLOOD PRESSURE: 60 MMHG | HEART RATE: 72 BPM

## 2021-05-07 LAB
COVID-19 SPIKE DOMAIN AB INTERP: POSITIVE
COVID-19 SPIKE DOMAIN ANTIBODY RESULT: >250 U/ML — HIGH
HCV AB S/CO SERPL IA: 0.04 COI — SIGNIFICANT CHANGE UP
HCV AB SERPL-IMP: SIGNIFICANT CHANGE UP
SARS-COV-2 IGG+IGM SERPL QL IA: >250 U/ML — HIGH
SARS-COV-2 IGG+IGM SERPL QL IA: POSITIVE
TROPONIN T SERPL-MCNC: <0.01 NG/ML — SIGNIFICANT CHANGE UP

## 2021-05-07 PROCEDURE — 99238 HOSP IP/OBS DSCHRG MGMT 30/<: CPT

## 2021-05-07 RX ORDER — PANTOPRAZOLE SODIUM 20 MG/1
40 TABLET, DELAYED RELEASE ORAL
Refills: 0 | Status: DISCONTINUED | OUTPATIENT
Start: 2021-05-07 | End: 2021-05-07

## 2021-05-07 RX ORDER — ACETAMINOPHEN 500 MG
650 TABLET ORAL EVERY 8 HOURS
Refills: 0 | Status: DISCONTINUED | OUTPATIENT
Start: 2021-05-07 | End: 2021-05-07

## 2021-05-07 RX ORDER — TRAZODONE HCL 50 MG
0 TABLET ORAL
Qty: 0 | Refills: 0 | DISCHARGE

## 2021-05-07 RX ORDER — ENOXAPARIN SODIUM 100 MG/ML
40 INJECTION SUBCUTANEOUS AT BEDTIME
Refills: 0 | Status: DISCONTINUED | OUTPATIENT
Start: 2021-05-07 | End: 2021-05-07

## 2021-05-07 RX ORDER — NEOMYCIN SULF/POLYMYXIN B SULF 40-200K/ML
0 VIAL (ML) IRRIGATION
Qty: 0 | Refills: 0 | DISCHARGE

## 2021-05-07 RX ORDER — PANTOPRAZOLE SODIUM 20 MG/1
1 TABLET, DELAYED RELEASE ORAL
Qty: 30 | Refills: 0
Start: 2021-05-07 | End: 2021-06-05

## 2021-05-07 RX ORDER — CHOLECALCIFEROL (VITAMIN D3) 125 MCG
1 CAPSULE ORAL
Qty: 0 | Refills: 0 | DISCHARGE

## 2021-05-07 RX ADMIN — PANTOPRAZOLE SODIUM 40 MILLIGRAM(S): 20 TABLET, DELAYED RELEASE ORAL at 05:57

## 2021-05-07 RX ADMIN — Medication 30 MILLILITER(S): at 11:00

## 2021-05-07 NOTE — DISCHARGE NOTE PROVIDER - CARE PROVIDER_API CALL
Wilfredo Carolina)  Geriatric Medicine; Internal Medicine  [address] [place] [address]  Phone: [phone]  Fax: [phone]  Follow Up Time:

## 2021-05-07 NOTE — CONSULT NOTE ADULT - ASSESSMENT
Patient with hx gerd. She has had episodes chest pain lasting 24 hours. Pain yesterday worse move chest, Lasted a day. No pain now. Neg stress test in past. Need r/o mi. But pain may be gi. Out patient SE . Patient aware

## 2021-05-07 NOTE — DISCHARGE NOTE NURSING/CASE MANAGEMENT/SOCIAL WORK - PATIENT PORTAL LINK FT
You can access the FollowMyHealth Patient Portal offered by [facility] by registering at the following website: [email or web address]. By joining [facility]’s FollowMyHealth portal, you will also be able to view your health information using other applications (apps) compatible with our system.

## 2021-05-07 NOTE — DISCHARGE NOTE PROVIDER - NSDCCPCAREPLAN_GEN_ALL_CORE_FT
PRINCIPAL DISCHARGE DIAGNOSIS  Diagnosis: Chest pain  Assessment and Plan of Treatment: It is unlikely that your pain is realted to a heart condition.  Based upon your symptoms and work up you likely are feeling acid reflux.  Take Protonix as prescribed for 1 month and see your primary care doctor within 1-2 weeks after discharge from the hospital.

## 2021-05-07 NOTE — PATIENT PROFILE ADULT - NSPROPTRIGHTCAREGIVER_GEN_A_NUR
Ramos Caraballo  NEUROLOGY  [facility], [address]  [place] [address]  Phone: [phone]  Fax: [phone]  Follow Up Time: 4-6 Days  
no

## 2021-05-07 NOTE — DISCHARGE NOTE PROVIDER - NSDCMRMEDTOKEN_GEN_ALL_CORE_FT
folic acid 1 mg oral tablet: 1 tab(s) orally once a day  methotrexate 2.5 mg oral tablet: 6 TABS EVERY MONDAY  pantoprazole 40 mg oral delayed release tablet: 1 tab(s) orally once a day (before a meal)

## 2021-05-07 NOTE — DISCHARGE NOTE PROVIDER - HOSPITAL COURSE
76 yr old female with hx of RA (on MTX), GERD, gastric ulcer  presented to ER for chest pain since yesterday.      -Pain no longer present  -Pain was sharp, center chest, associated with movement, lying down, and after meals.  -EKG NSR  -Troponin neg x 2.  Likely gerd.  Will DC on PPI and patient will follow up with her PMD.

## 2021-05-07 NOTE — CONSULT NOTE ADULT - SUBJECTIVE AND OBJECTIVE BOX
CARDIOLOGY CONSULT NOTE     CHIEF COMPLAINT/REASON FOR CONSULT:    HPI:  76 yr old female with hx of RA (on MTX), GERD, gastric ulcer  presented to ER for chest pain since yesterday. As per patient she suddenly woke up at 1 AM yesterday with central chest pain, continuous non radiating, pain fluctuating from 5 to 9/10 with no aggravating or relieving factors. Her pain persisted throught the night and today all day. Patient reports paitn was worse after haing dinner tonight so presented to ER. She had similar symptoms 1 month before that resolved on its own. Patient can ambulate >3 blocks or climb 2 flights of stairs with no issues. She denies any fever, chills, palpitaion, abdominal pain. Lives home with family, social negx3.      (06 May 2021 23:52)      PAST MEDICAL & SURGICAL HISTORY:  RA (rheumatoid arthritis)    Benign ovarian cyst  RIGHT OVARY REMOVED    History of cholecystectomy    History of appendectomy    History of hip replacement, total, right  1998    H/O bilateral cataract extraction        Cardiac Risks:   [ ]HTN, [ ] DM, [ ] Smoking, [ ] FH,  [x ] Lipids        MEDICATIONS:  MEDICATIONS  (STANDING):  enoxaparin Injectable 40 milliGRAM(s) SubCutaneous at bedtime  pantoprazole    Tablet 40 milliGRAM(s) Oral before breakfast      FAMILY HISTORY:  FHx: pancreatic cancer (Father)        SOCIAL HISTORY:      [ ] Marital status    Allergies    morphine (Hives)        	    REVIEW OF SYSTEMS:  CONSTITUTIONAL: No fever, weight loss, or fatigue  EYES: No eye pain, visual disturbances, or discharge  ENMT:  No difficulty hearing, tinnitus, vertigo; No sinus or throat pain  NECK: No pain or stiffness  RESPIRATORY: No cough, wheezing, chills or hemoptysis; No Shortness of Breath  CARDIOVASCULAR: See above  GASTROINTESTINAL: No abdominal or epigastric pain. No nausea, vomiting, or hematemesis; No diarrhea or constipation. No melena or hematochezia.  GENITOURINARY: No dysuria, frequency, hematuria, or incontinence  NEUROLOGICAL: No headaches, memory loss, loss of strength, numbness, or tremors  SKIN: No itching, burning, rashes, or lesions   	      PHYSICAL EXAM:  T(C): 36.2 (05-07-21 @ 06:09), Max: 36.2 (05-07-21 @ 06:09)  HR: 72 (05-07-21 @ 06:09) (72 - 93)  BP: 120/60 (05-07-21 @ 06:09) (120/60 - 147/67)  RR: 16 (05-07-21 @ 06:09) (16 - 20)  SpO2: 100% (05-06-21 @ 22:18) (99% - 100%)  Wt(kg): --  I&O's Summary      Appearance: Normal	  Psychiatry: A & O x 3, Mood & affect appropriate  HEENT:   Normal oral mucosa, PERRL, EOMI	  Lymphatic: No lymphadenopathy  Cardiovascular: Normal S1 S2,RRR, No JVD, No murmurs  Respiratory: Lungs clear to auscultation	  Gastrointestinal:  Soft, Non-tender, + BS	  Skin: No rashes, No ecchymoses, No cyanosis	  Neurologic: Non-focal  Extremities: Normal range of motion, No clubbing, cyanosis or edema  Vascular: Peripheral pulses palpable 2+ bilaterally      ECG:  	nsr ns st changes    	  LABS:	 	    CARDIAC MARKERS:                                    12.1   7.45  )-----------( 165      ( 06 May 2021 21:03 )             35.7     05-06    145  |  109  |  22<H>  ----------------------------<  110<H>  3.9   |  26  |  1.2    Ca    9.8      06 May 2021 21:03    TPro  6.8  /  Alb  4.4  /  TBili  0.2  /  DBili  x   /  AST  19  /  ALT  14  /  AlkPhos  81  05-06

## 2021-05-12 DIAGNOSIS — Z98.41 CATARACT EXTRACTION STATUS, RIGHT EYE: ICD-10-CM

## 2021-05-12 DIAGNOSIS — Z96.641 PRESENCE OF RIGHT ARTIFICIAL HIP JOINT: ICD-10-CM

## 2021-05-12 DIAGNOSIS — Z98.42 CATARACT EXTRACTION STATUS, LEFT EYE: ICD-10-CM

## 2021-05-12 DIAGNOSIS — Z90.49 ACQUIRED ABSENCE OF OTHER SPECIFIED PARTS OF DIGESTIVE TRACT: ICD-10-CM

## 2021-05-12 DIAGNOSIS — R07.9 CHEST PAIN, UNSPECIFIED: ICD-10-CM

## 2021-05-12 DIAGNOSIS — M06.9 RHEUMATOID ARTHRITIS, UNSPECIFIED: ICD-10-CM

## 2021-05-12 DIAGNOSIS — K21.9 GASTRO-ESOPHAGEAL REFLUX DISEASE WITHOUT ESOPHAGITIS: ICD-10-CM

## 2021-11-27 NOTE — ED ADULT NURSE NOTE - DOES PATIENT HAVE ADVANCE DIRECTIVE
This is a recent snapshot of the patient's [facility] medical record.  For current drug dose and complete information and questions, call [phone] or In [facility] pool, fv home infusion ([id number])  CSN Number:  [id number]       No

## 2022-03-15 NOTE — ED ADULT TRIAGE NOTE - DIRECT TO ROOM CARE INITIATED:
Addended by: RADHA BATISTA on: 3/15/2022 05:04 PM     Modules accepted: Orders    
24-Nov-2018 00:15

## 2022-10-11 NOTE — ED ADULT NURSE NOTE - NS ED NURSE DISCH DISPOSITION
Writer to bedside at this time. Pt in the bathroom and patients mother informs writer that patient was having a bowel movement.  Pt back to bed and educated that if she feels like she has to have a BM anymore to let writer know first. Admitted

## 2022-11-08 ENCOUNTER — OUTPATIENT (OUTPATIENT)
Dept: OUTPATIENT SERVICES | Facility: HOSPITAL | Age: 77
LOS: 1 days | Discharge: HOME | End: 2022-11-08

## 2022-11-08 DIAGNOSIS — Z90.49 ACQUIRED ABSENCE OF OTHER SPECIFIED PARTS OF DIGESTIVE TRACT: Chronic | ICD-10-CM

## 2022-11-08 DIAGNOSIS — M54.9 DORSALGIA, UNSPECIFIED: ICD-10-CM

## 2022-11-08 DIAGNOSIS — E78.5 HYPERLIPIDEMIA, UNSPECIFIED: ICD-10-CM

## 2022-11-08 DIAGNOSIS — K21.9 GASTRO-ESOPHAGEAL REFLUX DISEASE WITHOUT ESOPHAGITIS: ICD-10-CM

## 2022-11-08 DIAGNOSIS — Z98.41 CATARACT EXTRACTION STATUS, RIGHT EYE: Chronic | ICD-10-CM

## 2022-11-08 DIAGNOSIS — Z96.641 PRESENCE OF RIGHT ARTIFICIAL HIP JOINT: Chronic | ICD-10-CM

## 2022-11-08 DIAGNOSIS — N83.209 UNSPECIFIED OVARIAN CYST, UNSPECIFIED SIDE: Chronic | ICD-10-CM

## 2022-11-08 DIAGNOSIS — M06.9 RHEUMATOID ARTHRITIS, UNSPECIFIED: ICD-10-CM

## 2022-11-08 PROCEDURE — 71101 X-RAY EXAM UNILAT RIBS/CHEST: CPT | Mod: 26,RT

## 2022-11-14 ENCOUNTER — APPOINTMENT (OUTPATIENT)
Dept: CARDIOLOGY | Facility: CLINIC | Age: 77
End: 2022-11-14

## 2022-11-29 ENCOUNTER — APPOINTMENT (OUTPATIENT)
Dept: GASTROENTEROLOGY | Facility: CLINIC | Age: 77
End: 2022-11-29

## 2023-03-01 ENCOUNTER — INPATIENT (INPATIENT)
Facility: HOSPITAL | Age: 78
LOS: 1 days | Discharge: HOME | DRG: 301 | End: 2023-03-03
Attending: INTERNAL MEDICINE | Admitting: INTERNAL MEDICINE
Payer: MEDICARE

## 2023-03-01 VITALS
HEIGHT: 65 IN | SYSTOLIC BLOOD PRESSURE: 141 MMHG | DIASTOLIC BLOOD PRESSURE: 73 MMHG | WEIGHT: 138.01 LBS | OXYGEN SATURATION: 99 % | HEART RATE: 76 BPM | RESPIRATION RATE: 20 BRPM | TEMPERATURE: 98 F

## 2023-03-01 DIAGNOSIS — Z88.8 ALLERGY STATUS TO OTHER DRUGS, MEDICAMENTS AND BIOLOGICAL SUBSTANCES STATUS: ICD-10-CM

## 2023-03-01 DIAGNOSIS — Z80.8 FAMILY HISTORY OF MALIGNANT NEOPLASM OF OTHER ORGANS OR SYSTEMS: ICD-10-CM

## 2023-03-01 DIAGNOSIS — M06.9 RHEUMATOID ARTHRITIS, UNSPECIFIED: ICD-10-CM

## 2023-03-01 DIAGNOSIS — R42 DIZZINESS AND GIDDINESS: ICD-10-CM

## 2023-03-01 DIAGNOSIS — Z90.49 ACQUIRED ABSENCE OF OTHER SPECIFIED PARTS OF DIGESTIVE TRACT: ICD-10-CM

## 2023-03-01 DIAGNOSIS — I72.5 ANEURYSM OF OTHER PRECEREBRAL ARTERIES: ICD-10-CM

## 2023-03-01 DIAGNOSIS — I67.1 CEREBRAL ANEURYSM, NONRUPTURED: ICD-10-CM

## 2023-03-01 DIAGNOSIS — Z85.51 PERSONAL HISTORY OF MALIGNANT NEOPLASM OF BLADDER: ICD-10-CM

## 2023-03-01 DIAGNOSIS — K21.9 GASTRO-ESOPHAGEAL REFLUX DISEASE WITHOUT ESOPHAGITIS: ICD-10-CM

## 2023-03-01 DIAGNOSIS — Z98.890 OTHER SPECIFIED POSTPROCEDURAL STATES: Chronic | ICD-10-CM

## 2023-03-01 DIAGNOSIS — Z98.41 CATARACT EXTRACTION STATUS, RIGHT EYE: ICD-10-CM

## 2023-03-01 DIAGNOSIS — N18.31 CHRONIC KIDNEY DISEASE, STAGE 3A: ICD-10-CM

## 2023-03-01 DIAGNOSIS — Z98.42 CATARACT EXTRACTION STATUS, LEFT EYE: ICD-10-CM

## 2023-03-01 DIAGNOSIS — Z98.41 CATARACT EXTRACTION STATUS, RIGHT EYE: Chronic | ICD-10-CM

## 2023-03-01 DIAGNOSIS — Z96.641 PRESENCE OF RIGHT ARTIFICIAL HIP JOINT: Chronic | ICD-10-CM

## 2023-03-01 DIAGNOSIS — Z90.49 ACQUIRED ABSENCE OF OTHER SPECIFIED PARTS OF DIGESTIVE TRACT: Chronic | ICD-10-CM

## 2023-03-01 DIAGNOSIS — N83.209 UNSPECIFIED OVARIAN CYST, UNSPECIFIED SIDE: Chronic | ICD-10-CM

## 2023-03-01 DIAGNOSIS — Z96.641 PRESENCE OF RIGHT ARTIFICIAL HIP JOINT: ICD-10-CM

## 2023-03-01 LAB
ALBUMIN SERPL ELPH-MCNC: 4.6 G/DL — SIGNIFICANT CHANGE UP (ref 3.5–5.2)
ALP SERPL-CCNC: 89 U/L — SIGNIFICANT CHANGE UP (ref 30–115)
ALT FLD-CCNC: 19 U/L — SIGNIFICANT CHANGE UP (ref 0–41)
ANION GAP SERPL CALC-SCNC: 12 MMOL/L — SIGNIFICANT CHANGE UP (ref 7–14)
APTT BLD: 33 SEC — SIGNIFICANT CHANGE UP (ref 27–39.2)
AST SERPL-CCNC: 20 U/L — SIGNIFICANT CHANGE UP (ref 0–41)
BASOPHILS # BLD AUTO: 0.04 K/UL — SIGNIFICANT CHANGE UP (ref 0–0.2)
BASOPHILS NFR BLD AUTO: 0.4 % — SIGNIFICANT CHANGE UP (ref 0–1)
BILIRUB SERPL-MCNC: 0.3 MG/DL — SIGNIFICANT CHANGE UP (ref 0.2–1.2)
BUN SERPL-MCNC: 23 MG/DL — HIGH (ref 10–20)
CALCIUM SERPL-MCNC: 9.8 MG/DL — SIGNIFICANT CHANGE UP (ref 8.4–10.5)
CHLORIDE SERPL-SCNC: 102 MMOL/L — SIGNIFICANT CHANGE UP (ref 98–110)
CO2 SERPL-SCNC: 25 MMOL/L — SIGNIFICANT CHANGE UP (ref 17–32)
CREAT SERPL-MCNC: 1.1 MG/DL — SIGNIFICANT CHANGE UP (ref 0.7–1.5)
EGFR: 51 ML/MIN/1.73M2 — LOW
EOSINOPHIL # BLD AUTO: 0.06 K/UL — SIGNIFICANT CHANGE UP (ref 0–0.7)
EOSINOPHIL NFR BLD AUTO: 0.6 % — SIGNIFICANT CHANGE UP (ref 0–8)
GLUCOSE SERPL-MCNC: 121 MG/DL — HIGH (ref 70–99)
HCT VFR BLD CALC: 43.1 % — SIGNIFICANT CHANGE UP (ref 37–47)
HGB BLD-MCNC: 14.2 G/DL — SIGNIFICANT CHANGE UP (ref 12–16)
IMM GRANULOCYTES NFR BLD AUTO: 0.3 % — SIGNIFICANT CHANGE UP (ref 0.1–0.3)
INR BLD: 1.03 RATIO — SIGNIFICANT CHANGE UP (ref 0.65–1.3)
LYMPHOCYTES # BLD AUTO: 1.5 K/UL — SIGNIFICANT CHANGE UP (ref 1.2–3.4)
LYMPHOCYTES # BLD AUTO: 15 % — LOW (ref 20.5–51.1)
MCHC RBC-ENTMCNC: 32.9 G/DL — SIGNIFICANT CHANGE UP (ref 32–37)
MCHC RBC-ENTMCNC: 32.9 PG — HIGH (ref 27–31)
MCV RBC AUTO: 99.8 FL — HIGH (ref 81–99)
MONOCYTES # BLD AUTO: 0.7 K/UL — HIGH (ref 0.1–0.6)
MONOCYTES NFR BLD AUTO: 7 % — SIGNIFICANT CHANGE UP (ref 1.7–9.3)
NEUTROPHILS # BLD AUTO: 7.65 K/UL — HIGH (ref 1.4–6.5)
NEUTROPHILS NFR BLD AUTO: 76.7 % — HIGH (ref 42.2–75.2)
NRBC # BLD: 0 /100 WBCS — SIGNIFICANT CHANGE UP (ref 0–0)
PLATELET # BLD AUTO: 162 K/UL — SIGNIFICANT CHANGE UP (ref 130–400)
POTASSIUM SERPL-MCNC: 4 MMOL/L — SIGNIFICANT CHANGE UP (ref 3.5–5)
POTASSIUM SERPL-SCNC: 4 MMOL/L — SIGNIFICANT CHANGE UP (ref 3.5–5)
PROT SERPL-MCNC: 7.2 G/DL — SIGNIFICANT CHANGE UP (ref 6–8)
PROTHROM AB SERPL-ACNC: 11.7 SEC — SIGNIFICANT CHANGE UP (ref 9.95–12.87)
RBC # BLD: 4.32 M/UL — SIGNIFICANT CHANGE UP (ref 4.2–5.4)
RBC # FLD: 12.7 % — SIGNIFICANT CHANGE UP (ref 11.5–14.5)
SARS-COV-2 RNA SPEC QL NAA+PROBE: SIGNIFICANT CHANGE UP
SODIUM SERPL-SCNC: 139 MMOL/L — SIGNIFICANT CHANGE UP (ref 135–146)
TROPONIN T SERPL-MCNC: <0.01 NG/ML — SIGNIFICANT CHANGE UP
WBC # BLD: 9.98 K/UL — SIGNIFICANT CHANGE UP (ref 4.8–10.8)
WBC # FLD AUTO: 9.98 K/UL — SIGNIFICANT CHANGE UP (ref 4.8–10.8)

## 2023-03-01 PROCEDURE — 94760 N-INVAS EAR/PLS OXIMETRY 1: CPT

## 2023-03-01 PROCEDURE — 36415 COLL VENOUS BLD VENIPUNCTURE: CPT

## 2023-03-01 PROCEDURE — 97162 PT EVAL MOD COMPLEX 30 MIN: CPT | Mod: GP

## 2023-03-01 PROCEDURE — 85025 COMPLETE CBC W/AUTO DIFF WBC: CPT

## 2023-03-01 PROCEDURE — 81001 URINALYSIS AUTO W/SCOPE: CPT

## 2023-03-01 PROCEDURE — 70496 CT ANGIOGRAPHY HEAD: CPT | Mod: 26,MA

## 2023-03-01 PROCEDURE — 99285 EMERGENCY DEPT VISIT HI MDM: CPT

## 2023-03-01 PROCEDURE — 99223 1ST HOSP IP/OBS HIGH 75: CPT

## 2023-03-01 PROCEDURE — 80053 COMPREHEN METABOLIC PANEL: CPT

## 2023-03-01 PROCEDURE — 70498 CT ANGIOGRAPHY NECK: CPT | Mod: 26,MA

## 2023-03-01 PROCEDURE — 83735 ASSAY OF MAGNESIUM: CPT

## 2023-03-01 PROCEDURE — 70551 MRI BRAIN STEM W/O DYE: CPT

## 2023-03-01 RX ORDER — MECLIZINE HCL 12.5 MG
25 TABLET ORAL EVERY 6 HOURS
Refills: 0 | Status: DISCONTINUED | OUTPATIENT
Start: 2023-03-01 | End: 2023-03-02

## 2023-03-01 RX ORDER — SODIUM CHLORIDE 9 MG/ML
1000 INJECTION INTRAMUSCULAR; INTRAVENOUS; SUBCUTANEOUS
Refills: 0 | Status: DISCONTINUED | OUTPATIENT
Start: 2023-03-01 | End: 2023-03-03

## 2023-03-01 RX ORDER — SODIUM CHLORIDE 9 MG/ML
1000 INJECTION INTRAMUSCULAR; INTRAVENOUS; SUBCUTANEOUS ONCE
Refills: 0 | Status: COMPLETED | OUTPATIENT
Start: 2023-03-01 | End: 2023-03-01

## 2023-03-01 RX ORDER — ONDANSETRON 8 MG/1
4 TABLET, FILM COATED ORAL EVERY 8 HOURS
Refills: 0 | Status: DISCONTINUED | OUTPATIENT
Start: 2023-03-01 | End: 2023-03-03

## 2023-03-01 RX ORDER — FOLIC ACID 0.8 MG
1 TABLET ORAL DAILY
Refills: 0 | Status: DISCONTINUED | OUTPATIENT
Start: 2023-03-01 | End: 2023-03-03

## 2023-03-01 RX ORDER — ENOXAPARIN SODIUM 100 MG/ML
40 INJECTION SUBCUTANEOUS EVERY 24 HOURS
Refills: 0 | Status: DISCONTINUED | OUTPATIENT
Start: 2023-03-01 | End: 2023-03-03

## 2023-03-01 RX ORDER — PANTOPRAZOLE SODIUM 20 MG/1
40 TABLET, DELAYED RELEASE ORAL
Refills: 0 | Status: DISCONTINUED | OUTPATIENT
Start: 2023-03-01 | End: 2023-03-03

## 2023-03-01 RX ORDER — METHOTREXATE 2.5 MG/1
15 TABLET ORAL
Refills: 0 | Status: DISCONTINUED | OUTPATIENT
Start: 2023-03-01 | End: 2023-03-03

## 2023-03-01 RX ORDER — LANOLIN ALCOHOL/MO/W.PET/CERES
3 CREAM (GRAM) TOPICAL AT BEDTIME
Refills: 0 | Status: DISCONTINUED | OUTPATIENT
Start: 2023-03-01 | End: 2023-03-03

## 2023-03-01 RX ORDER — ACETAMINOPHEN 500 MG
650 TABLET ORAL EVERY 6 HOURS
Refills: 0 | Status: DISCONTINUED | OUTPATIENT
Start: 2023-03-01 | End: 2023-03-03

## 2023-03-01 RX ORDER — CHLORHEXIDINE GLUCONATE 213 G/1000ML
1 SOLUTION TOPICAL
Refills: 0 | Status: DISCONTINUED | OUTPATIENT
Start: 2023-03-01 | End: 2023-03-03

## 2023-03-01 RX ORDER — MECLIZINE HCL 12.5 MG
50 TABLET ORAL ONCE
Refills: 0 | Status: COMPLETED | OUTPATIENT
Start: 2023-03-01 | End: 2023-03-01

## 2023-03-01 RX ADMIN — SODIUM CHLORIDE 75 MILLILITER(S): 9 INJECTION INTRAMUSCULAR; INTRAVENOUS; SUBCUTANEOUS at 21:30

## 2023-03-01 RX ADMIN — SODIUM CHLORIDE 1000 MILLILITER(S): 9 INJECTION INTRAMUSCULAR; INTRAVENOUS; SUBCUTANEOUS at 14:11

## 2023-03-01 RX ADMIN — ENOXAPARIN SODIUM 40 MILLIGRAM(S): 100 INJECTION SUBCUTANEOUS at 21:32

## 2023-03-01 RX ADMIN — Medication 50 MILLIGRAM(S): at 15:57

## 2023-03-01 NOTE — ED PROVIDER NOTE - ATTENDING APP SHARED VISIT CONTRIBUTION OF CARE
78-year-old female past medical history.  Presents for evaluation of feeling dizzy for last few days.  Patient initially on and off but now lasting longer and occurring more frequently.  Patient feels that she is off balance and needing to hold onto objects when she ambulates.  No nausea or vomiting, no numbness or tingling, on exam patient in NAD, AAOx3, PERRL, positive nystagmus, face is symmetrical, speech is clear and fluent, no drift, good tone and equal strength

## 2023-03-01 NOTE — H&P ADULT - NSHPPHYSICALEXAM_GEN_ALL_CORE
Vital Signs Last 24 Hrs    T(F): 97.8 (03-01-23 @ 13:50), Max: 97.8 (03-01-23 @ 13:50)  HR: 76 (03-01-23 @ 13:50) (76 - 76)  BP: 141/73 (03-01-23 @ 13:50)  RR: 20 (03-01-23 @ 13:50) (20 - 20)  SpO2: 99% (03-01-23 @ 13:50) (99% - 99%)    PHYSICAL EXAM:      Constitutional: NAD, A&O x3    Eyes: PERRLA    Respiratory: +air entry, no rales, no rhonchi, no wheezes    Cardiovascular: +S1 and S2, regular rate and rhythm    Gastrointestinal: +BS, soft, non-tender, not distended    Extremities:  no edema, no calf tenderness    Vascular: +dorsal pedis and radial pulses, no extremity cyanosis    Neurological: sensation intact, ROM equal B/L, CN II-XII intact    Skin: no rashes, normal turgor    Strength equal bilaterally upper and lower extremities

## 2023-03-01 NOTE — ED PROVIDER NOTE - CLINICAL SUMMARY MEDICAL DECISION MAKING FREE TEXT BOX
Labs and imaging obtained.  This patient's symptoms were treated.  Patient with aneurysm (see CT report.)  We discussed with neuro endovascular on-call.  Patient still feeling un steady so I recommend admission.  Per neurovascular patient can stay [place].  Recommend neurology consult.

## 2023-03-01 NOTE — H&P ADULT - ASSESSMENT
77 y/o female admitted with c/o Dizziness that started a few days ago                       79 y/o female admitted with c/o Dizziness that started a few days ago    Case discussed with Dr Bernal

## 2023-03-01 NOTE — ED PROVIDER NOTE - PHYSICAL EXAMINATION
CONST: Well appearing in NAD  EYES: PERRL, EOMI, Sclera and conjunctiva clear.  NECK: Non-tender, no meningeal signs,   CARD: Normal S1 S2; Normal rate and rhythm  RESP: Equal BS B/L, No wheezes, rhonchi or rales. No distress  GI: Soft, non-tender, non-distended.  MS: Normal ROM in all extremities. No edema of lower extremities, no calf pain, radial pulses 2+ bilaterally  SKIN: Warm, dry, no acute rashes. Good turgor  NEURO: A&Ox3, No focal deficits. Strength 5/5 with no sensory deficits. Steady gait

## 2023-03-01 NOTE — H&P ADULT - NSICDXPASTSURGICALHX_GEN_ALL_CORE_FT
SW received an in basket message to contact patient's daughter who is requesting a visit to assist with community services.  Spoke with daughter Cierra [phone] who stated that patient has four children, two who live out of state, [place] of [place] and a brother who lives nearby but is in the trade business and unable to assist with patient care at this time. She would like for patient to come and stay with her 3 days a week to begin with so that she can assist patient with her ADLs, etc.  Patient is a fall risk.  A visit was scheduled for May 11th.    Total time of Telemed visit:  20 minutes    MILTON Mercado, LSW   PAST SURGICAL HISTORY:  Benign ovarian cyst RIGHT OVARY REMOVED    H/O bilateral cataract extraction     H/O transurethral resection of bladder tumor (TURBT)     History of appendectomy     History of cholecystectomy     History of hip replacement, total, right 1998

## 2023-03-01 NOTE — ED PROVIDER NOTE - PROGRESS NOTE DETAILS
discussed case with neuroendovascular.  see their note.  states pt can be admitted south.  have neuro see pt.  if they deem pt needs transfer, then will send pt north.

## 2023-03-01 NOTE — ED PROVIDER NOTE - OBJECTIVE STATEMENT
78-year-old female with history of RA on methotrexate presents to the ED for evaluation of dizziness x3 days.  Intermittent, room spinning, worse with change in position, no associated pain, no headache, no nausea, vomiting, paresthesias, focal weakness.  No recent head injuries or trauma.  No anticoagulations or antiplatelets.

## 2023-03-01 NOTE — H&P ADULT - NSHPLABSRESULTS_GEN_ALL_CORE
14.2   9.98  )-----------( 162      ( 01 Mar 2023 13:50 )             43.1       03-01    139  |  102  |  23<H>  ----------------------------<  121<H>  4.0   |  25  |  1.1    Ca    9.8      01 Mar 2023 13:50    TPro  7.2  /  Alb  4.6  /  TBili  0.3  /  DBili  x   /  AST  20  /  ALT  19  /  AlkPhos  89  03-01          PT/INR - ( 01 Mar 2023 13:50 )   PT: 11.70 sec;   INR: 1.03 ratio         PTT - ( 01 Mar 2023 13:50 )  PTT:33.0 sec      CARDIAC MARKERS ( 01 Mar 2023 13:50 )  x     / <0.01 ng/mL         POCT Blood Glucose.: 107 mg/dL (01 Mar 2023 14:04)     CT Angio Neck w/ IV Cont (03.01.23 @ 15:49) >    IMPRESSION:    1.  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    2.  Additional tiny (1.5 mm) saccular aneurysm arising from the left   posterior communicating artery (near the P2 junction).    3.  No evidence of major vascular stenosis or occlusion.      CT Head No Cont (03.01.23 @ 15:49) >    IMPRESSION:    1.  Mild motion limitation. No evidence of acuteintracranial pathology.    2.  Moderate chronic microvascular changes.

## 2023-03-01 NOTE — H&P ADULT - PROBLEM SELECTOR PLAN 4
saccular aneurysm seen on  CT Scan: vascular surgery called and will see patient in AM saccular aneurysm seen on  CT Scan: Neuroendovascular surgery called and recommended Neurology see patient. Saccular aneurysm seen on  CT Scan: Neuroendovascular surgery called and recommended Neurology see patient and if they feel he need Neuroendo vascular intervention she can be transferred North

## 2023-03-01 NOTE — PATIENT PROFILE ADULT - FALL HARM RISK - HARM RISK INTERVENTIONS
Assistance with ambulation/Assistance OOB with selected safe patient handling equipment/Communicate Risk of Fall with Harm to all staff/Monitor gait and stability/Reinforce activity limits and safety measures with patient and family/Sit up slowly, dangle for a short time, stand at bedside before walking/Tailored Fall Risk Interventions/Visual Cue: Yellow wristband and red socks/Bed in lowest position, wheels locked, appropriate side rails in place/Call bell, personal items and telephone in reach/Instruct patient to call for assistance before getting out of bed or chair/Non-slip footwear when patient is out of bed/[place] to call system/Physically safe environment - no spills, clutter or unnecessary equipment/Purposeful Proactive Rounding/Room/bathroom lighting operational, light cord in reach

## 2023-03-01 NOTE — ED PROVIDER NOTE - NS ED ROS FT
Constitutional: See HPI.  Eyes: No visual changes, eye pain or discharge.  ENMT: No hearing changes, pain, discharge or infections.   Cardiac: No SOB or edema. No chest pain with exertion.  Respiratory: No cough or respiratory distress.   GI: No nausea, vomiting, diarrhea or abdominal pain.  : No dysuria, frequency or burning. No Discharge  MS: No myalgia, muscle weakness, joint pain or back pain.  Neuro: + dizziness. No headache or weakness.   Skin: No skin rash.  Except as documented in the HPI, all other systems are negative.

## 2023-03-01 NOTE — H&P ADULT - HISTORY OF PRESENT ILLNESS
77y/o female PMH: RA, GERD, Bladder Cancer: surgical removal of bladder tumor: c/o Dizziness that started a few days ago. Dizziness ocurring on/off but today duration longer and occurring more often. No spinning sensation noted  - also reports loss of balance during these dizziness episodes, symptoms occur at any time: no precipitating factors  - nothing makes it better or worse, no hx of vertigo   - No recent inner ear infections  - unstable walking due to dizziness

## 2023-03-01 NOTE — CHART NOTE - NSCHARTNOTEFT_GEN_A_CORE
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 If the patient is admitted and a transfer to [facility] is considered for in-person evaluation, please contact [phone]. If discharged, the patient may follow up this coming Friday 3/3/23 with Dr. Pizarro at the neurosurgery outpatient clinic for further evaluation of the aneurysms.   - [phone] [facility]

## 2023-03-01 NOTE — H&P ADULT - PROBLEM SELECTOR PLAN 1
admit to low risk Tele  - orthostatic BP on arrival to floor  - continue N/S at 75ml/hr x 24 hrs  - consider Neurology consult  - consider Meclizine - admit to low risk Tele  - orthostatic BP on arrival to floor  - continue N/S at 75ml/hr x 24 hrs  - Neurology consult  - Meclizine 25mg Q6h/PRN  - Ambulate with assistance   - Eply maneuver

## 2023-03-01 NOTE — ED PROVIDER NOTE - NSICDXFAMILYHX_GEN_ALL_CORE_FT
FAMILY HISTORY:  Father  Still living? Unknown  FHx: pancreatic cancer, Age at diagnosis: Age Unknown

## 2023-03-02 LAB
ALBUMIN SERPL ELPH-MCNC: 3.4 G/DL — LOW (ref 3.5–5.2)
ALP SERPL-CCNC: 68 U/L — SIGNIFICANT CHANGE UP (ref 30–115)
ALT FLD-CCNC: 12 U/L — SIGNIFICANT CHANGE UP (ref 0–41)
ANION GAP SERPL CALC-SCNC: 9 MMOL/L — SIGNIFICANT CHANGE UP (ref 7–14)
APPEARANCE UR: CLEAR — SIGNIFICANT CHANGE UP
AST SERPL-CCNC: 15 U/L — SIGNIFICANT CHANGE UP (ref 0–41)
BASOPHILS # BLD AUTO: 0.04 K/UL — SIGNIFICANT CHANGE UP (ref 0–0.2)
BASOPHILS NFR BLD AUTO: 0.5 % — SIGNIFICANT CHANGE UP (ref 0–1)
BILIRUB SERPL-MCNC: 0.2 MG/DL — SIGNIFICANT CHANGE UP (ref 0.2–1.2)
BILIRUB UR-MCNC: NEGATIVE — SIGNIFICANT CHANGE UP
BUN SERPL-MCNC: 19 MG/DL — SIGNIFICANT CHANGE UP (ref 10–20)
CALCIUM SERPL-MCNC: 8.6 MG/DL — SIGNIFICANT CHANGE UP (ref 8.4–10.5)
CHLORIDE SERPL-SCNC: 112 MMOL/L — HIGH (ref 98–110)
CO2 SERPL-SCNC: 23 MMOL/L — SIGNIFICANT CHANGE UP (ref 17–32)
COLOR SPEC: YELLOW — SIGNIFICANT CHANGE UP
CREAT SERPL-MCNC: 1 MG/DL — SIGNIFICANT CHANGE UP (ref 0.7–1.5)
DIFF PNL FLD: ABNORMAL
EGFR: 58 ML/MIN/1.73M2 — LOW
EOSINOPHIL # BLD AUTO: 0.13 K/UL — SIGNIFICANT CHANGE UP (ref 0–0.7)
EOSINOPHIL NFR BLD AUTO: 1.8 % — SIGNIFICANT CHANGE UP (ref 0–8)
EPI CELLS # UR: ABNORMAL /HPF
GLUCOSE SERPL-MCNC: 87 MG/DL — SIGNIFICANT CHANGE UP (ref 70–99)
GLUCOSE UR QL: NEGATIVE MG/DL — SIGNIFICANT CHANGE UP
HCT VFR BLD CALC: 35.6 % — LOW (ref 37–47)
HGB BLD-MCNC: 11.6 G/DL — LOW (ref 12–16)
IMM GRANULOCYTES NFR BLD AUTO: 0.3 % — SIGNIFICANT CHANGE UP (ref 0.1–0.3)
KETONES UR-MCNC: NEGATIVE — SIGNIFICANT CHANGE UP
LEUKOCYTE ESTERASE UR-ACNC: NEGATIVE — SIGNIFICANT CHANGE UP
LYMPHOCYTES # BLD AUTO: 1.53 K/UL — SIGNIFICANT CHANGE UP (ref 1.2–3.4)
LYMPHOCYTES # BLD AUTO: 20.7 % — SIGNIFICANT CHANGE UP (ref 20.5–51.1)
MAGNESIUM SERPL-MCNC: 2.1 MG/DL — SIGNIFICANT CHANGE UP (ref 1.8–2.4)
MCHC RBC-ENTMCNC: 32.6 G/DL — SIGNIFICANT CHANGE UP (ref 32–37)
MCHC RBC-ENTMCNC: 32.9 PG — HIGH (ref 27–31)
MCV RBC AUTO: 100.8 FL — HIGH (ref 81–99)
MONOCYTES # BLD AUTO: 0.64 K/UL — HIGH (ref 0.1–0.6)
MONOCYTES NFR BLD AUTO: 8.7 % — SIGNIFICANT CHANGE UP (ref 1.7–9.3)
NEUTROPHILS # BLD AUTO: 5.03 K/UL — SIGNIFICANT CHANGE UP (ref 1.4–6.5)
NEUTROPHILS NFR BLD AUTO: 68 % — SIGNIFICANT CHANGE UP (ref 42.2–75.2)
NITRITE UR-MCNC: NEGATIVE — SIGNIFICANT CHANGE UP
NRBC # BLD: 0 /100 WBCS — SIGNIFICANT CHANGE UP (ref 0–0)
PH UR: 5.5 — SIGNIFICANT CHANGE UP (ref 5–8)
PLATELET # BLD AUTO: 125 K/UL — LOW (ref 130–400)
POTASSIUM SERPL-MCNC: 4.1 MMOL/L — SIGNIFICANT CHANGE UP (ref 3.5–5)
POTASSIUM SERPL-SCNC: 4.1 MMOL/L — SIGNIFICANT CHANGE UP (ref 3.5–5)
PROT SERPL-MCNC: 5.3 G/DL — LOW (ref 6–8)
PROT UR-MCNC: NEGATIVE MG/DL — SIGNIFICANT CHANGE UP
RBC # BLD: 3.53 M/UL — LOW (ref 4.2–5.4)
RBC # FLD: 12.9 % — SIGNIFICANT CHANGE UP (ref 11.5–14.5)
RBC CASTS # UR COMP ASSIST: SIGNIFICANT CHANGE UP /HPF
SODIUM SERPL-SCNC: 144 MMOL/L — SIGNIFICANT CHANGE UP (ref 135–146)
SP GR SPEC: 1.01 — SIGNIFICANT CHANGE UP (ref 1.01–1.03)
UROBILINOGEN FLD QL: 0.2 MG/DL — SIGNIFICANT CHANGE UP
WBC # BLD: 7.39 K/UL — SIGNIFICANT CHANGE UP (ref 4.8–10.8)
WBC # FLD AUTO: 7.39 K/UL — SIGNIFICANT CHANGE UP (ref 4.8–10.8)
WBC UR QL: SIGNIFICANT CHANGE UP /HPF

## 2023-03-02 PROCEDURE — 99232 SBSQ HOSP IP/OBS MODERATE 35: CPT

## 2023-03-02 PROCEDURE — 99222 1ST HOSP IP/OBS MODERATE 55: CPT

## 2023-03-02 RX ADMIN — Medication 1 MILLIGRAM(S): at 13:28

## 2023-03-02 RX ADMIN — Medication 3 MILLIGRAM(S): at 01:35

## 2023-03-02 RX ADMIN — ENOXAPARIN SODIUM 40 MILLIGRAM(S): 100 INJECTION SUBCUTANEOUS at 21:14

## 2023-03-02 RX ADMIN — METHOTREXATE 15 MILLIGRAM(S): 2.5 TABLET ORAL at 09:24

## 2023-03-02 RX ADMIN — PANTOPRAZOLE SODIUM 40 MILLIGRAM(S): 20 TABLET, DELAYED RELEASE ORAL at 05:18

## 2023-03-02 NOTE — PHYSICAL THERAPY INITIAL EVALUATION ADULT - PERTINENT HX OF CURRENT PROBLEM, REHAB EVAL
79 y/o female admitted to ED with diagnosis of Dizziness and giddiness. Patient complained of dizziness and loss of balance since Sunday 02/26/23; Saccular aneurysm seen in CT scan, referred to Neurology, pending MRI.

## 2023-03-02 NOTE — CHART NOTE - NSCHARTNOTEFT_GEN_A_CORE
Neuroendovascular team following peripherally for new findings of saccular 5.5mm right PCOMM aneurysm and saccular 1.5mm left PCOMM aneurysm on CTA.    Patient is pending MRI brain and if negative / cleared from neurology team, will arrange outpatient follow up for neuroendovascular evaluation during office hours.     Neurologic recs per neuro team at [facility].     Discussed with Jeanie Pizarro and Trish .     [phone] neuroendovascular [facility]

## 2023-03-02 NOTE — CONSULT NOTE ADULT - SUBJECTIVE AND OBJECTIVE BOX
Neurology Consult    Patient is a 78y old  Female who presents with a chief complaint of Dizziness (01 Mar 2023 18:33)      HPI:  79y/o female PMH: RA, GERD, Bladder Cancer: surgical removal of bladder tumor: c/o Dizziness that started a few days ago. Dizziness ocurring on/off but today duration longer and occurring more often. No spinning sensation noted.  Reports loss of balance only during dizziness with no recent inner ear infections.      PAST MEDICAL & SURGICAL HISTORY:  RA (rheumatoid arthritis)  Chronic GERD  Bladder cancer  Benign ovarian cyst  RIGHT OVARY REMOVED  History of cholecystectomy  History of appendectomy  History of hip replacement, total, right    H/O bilateral cataract extraction  H/O transurethral resection of bladder tumor (TURBT)    FAMILY HISTORY:  FHx: pancreatic cancer (Father)    Social History: (-) x 3    Allergies    No Known Allergies    Intolerances    MEDICATIONS  (STANDING):  chlorhexidine 4% Liquid 1 Application(s) Topical <User Schedule>  enoxaparin Injectable 40 milliGRAM(s) SubCutaneous every 24 hours  folic acid 1 milliGRAM(s) Oral daily  methotrexate 15 milliGRAM(s) Oral <User Schedule>  pantoprazole    Tablet 40 milliGRAM(s) Oral before breakfast  sodium chloride 0.9%. 1000 milliLiter(s) (75 mL/Hr) IV Continuous <Continuous>    MEDICATIONS  (PRN):  acetaminophen     Tablet .. 650 milliGRAM(s) Oral every 6 hours PRN Temp greater or equal to 38C (100.4F), Mild Pain (1 - 3)  aluminum hydroxide/magnesium hydroxide/simethicone Suspension 30 milliLiter(s) Oral every 4 hours PRN Dyspepsia  meclizine 25 milliGRAM(s) Oral every 6 hours PRN Dizziness  melatonin 3 milliGRAM(s) Oral at bedtime PRN Insomnia  ondansetron Injectable 4 milliGRAM(s) IV Push every 8 hours PRN Nausea and/or Vomiting    Review of systems:    Constitutional: as per HPI  Eyes: No eye pain or discharge  ENMT:  No difficulty hearing; No sinus or throat pain  Neck: No pain or stiffness  Respiratory: No cough, wheezing, chills or hemoptysis  Cardiovascular: No chest pain, palpitations, shortness of breath, dyspnea on exertion  Gastrointestinal: No abdominal pain, nausea, vomiting or hematemesis; No diarrhea or constipation.   Genitourinary: No dysuria, frequency, hematuria or incontinence  Neurological: As per HPI  Skin: No rashes or lesions   Endocrine: No heat or cold intolerance; No hair loss  Musculoskeletal: No joint pain or swelling  Psychiatric: No depression, anxiety, mood swings  Heme/Lymph: No easy bruising or bleeding gums    Vital Signs Last 24 Hrs  T(C): 36.2 (02 Mar 2023 04:50), Max: 36.6 (01 Mar 2023 13:50)  T(F): 97.1 (02 Mar 2023 04:50), Max: 97.8 (01 Mar 2023 13:50)  HR: 59 (02 Mar 2023 04:50) (59 - 76)  BP: 116/66 (02 Mar 2023 04:50) (116/66 - 177/77)  BP(mean): --  RR: 18 (02 Mar 2023 04:50) (18 - 20)  SpO2: 97% (02 Mar 2023 04:50) (95% - 99%)    Parameters below as of 02 Mar 2023 04:50  Patient On (Oxygen Delivery Method): room air    Examination:  General:  Appearance is consistent with chronologic age.  No abnormal facies.  Gross skin survey within normal limits.    Cognitive/Language:  The patient is oriented to person, place, time and date.  Recent and remote memory intact.  Fund of knowledge is intact and normal.  Language with normal repetition, comprehension and naming.  Nondysarthric.    Eyes: intact VA, VFF.  EOMI w/o nystagmus, skew or reported double vision.  PERRL.  No ptosis/weakness of eyelid closure.    Face:  Facial sensation normal V1 - 3, no facial asymmetry.    Ears/Nose/Throat:  Hearing grossly intact b/l.  Palate elevates midline.  Tongue and uvula midline.   Motor examination:   Normal tone, bulk and range of motion.  No tenderness, twitching, tremors or involuntary movements.  Formal Muscle Strength Testing: (MRC grade R/L) 5/5 UE; 5/5 LE.  No observable drift.  Reflexes:   2+ b/l pectoralis, biceps, triceps, brachioradialis, patella and Achilles.  Plantar response downgoing b/l.  Jaw jerk, Kel, clonus absent.  Sensory examination:   Intact to light touch and pinprick, pain, temperature and proprioception and vibration in all extremities.  Cerebellum:   FTN/HKS intact with normal BRITTANY in all limbs.  No dysmetria or dysdiadokinesia.  Gait narrow based and normal.    Labs:   CBC Full  -  ( 02 Mar 2023 06:11 )  WBC Count : 7.39 K/uL  RBC Count : 3.53 M/uL  Hemoglobin : 11.6 g/dL  Hematocrit : 35.6 %  Platelet Count - Automated : 125 K/uL  Mean Cell Volume : 100.8 fL  Mean Cell Hemoglobin : 32.9 pg  Mean Cell Hemoglobin Concentration : 32.6 g/dL  Auto Neutrophil # : 5.03 K/uL  Auto Lymphocyte # : 1.53 K/uL  Auto Monocyte # : 0.64 K/uL  Auto Eosinophil # : 0.13 K/uL  Auto Basophil # : 0.04 K/uL  Auto Neutrophil % : 68.0 %  Auto Lymphocyte % : 20.7 %  Auto Monocyte % : 8.7 %  Auto Eosinophil % : 1.8 %  Auto Basophil % : 0.5 %    03-02    144  |  112<H>  |  19  ----------------------------<  87  4.1   |  23  |  1.0    Ca    8.6      02 Mar 2023 06:11  Mg     2.1     03-02    TPro  5.3<L>  /  Alb  3.4<L>  /  TBili  0.2  /  DBili  x   /  AST  15  /  ALT  12  /  AlkPhos  68  03-02    LIVER FUNCTIONS - ( 02 Mar 2023 06:11 )  Alb: 3.4 g/dL / Pro: 5.3 g/dL / ALK PHOS: 68 U/L / ALT: 12 U/L / AST: 15 U/L / GGT: x           PT/INR - ( 01 Mar 2023 13:50 )   PT: 11.70 sec;   INR: 1.03 ratio         PTT - ( 01 Mar 2023 13:50 )  PTT:33.0 sec  Urinalysis Basic - ( 02 Mar 2023 01:30 )    Color: Yellow / Appearance: Clear / S.015 / pH: x  Gluc: x / Ketone: Negative  / Bili: Negative / Urobili: 0.2 mg/dL   Blood: x / Protein: Negative mg/dL / Nitrite: Negative   Leuk Esterase: Negative / RBC: 1-2 /HPF / WBC 1-2 /HPF   Sq Epi: x / Non Sq Epi: Occasional /HPF / Bacteria: x      Neuroimaging:  NCHCT: CT Head No Cont:   ACC: [id number] EXAM:  CT BRAIN   ORDERED BY: FRANCES PASTORON     PROCEDURE DATE  2023      INTERPRETATION:  Clinical History / Reason for exam: Dizziness for 3 days    Technique: Noncontrast head CT.  Contiguous unenhanced CT axial images of   the head from the base to the vertex with coronal and sagittal reformats.    Comparison: None available    Findings:    There is mild motion limitation.    The ventricles and cortical sulci are compatible with a moderate degree   of parenchymalvolume loss.    Patchy lucencies within the cerebral hemispheric white matter are most   compatible with chronic microvascular changes.    There is no acute intracranial hemorrhage, extra-axial fluid collection   or midline shift.  Gray-white matter differentiation is maintained.   Vascular calcifications are noted.    The visualized paranasal sinuses and mastoids are clear. The patient is   status post bilateral cataract surgery.    IMPRESSION:    1.  Mild motion limitation. No evidence of acuteintracranial pathology.    2.  Moderate chronic microvascular changes.    --- End of Report ---    VANI GOLDSTEIN MD; Attending Radiologist  This document has been electronically signed. Mar  1 2023  4:27PM (23 @ 15:49)    < from: CT Angio Neck w/ IV Cont (23 @ 15:49) >  IMPRESSION:    1.  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    2.  Additional tiny (1.5 mm) saccular aneurysm arising from the left   posterior communicating artery (near the P2 junction).    3.  No evidence of major vascular stenosis or occlusion.    --- End of Report ---    VANI GOLDSTEIN MD; Attending Radiologist  This document has beenelectronically signed. Mar  1 2023  4:54PM    < end of copied text >     Neurology Consult    Patient is a 78y old  Female who presents with a chief complaint of Dizziness (01 Mar 2023 18:33)    HPI:  77y/o female PMH: RA, GERD, Bladder Cancer: surgical removal of bladder tumor: c/o Dizziness that started a few days ago. Dizziness ocurring on/off but today duration longer and occurring more often. No spinning sensation noted.  Reports loss of balance only during dizziness with no recent inner ear infections.  Dizziness described more as lightheadedness but more persistent and intractable than prior episodes.  Denies any HA or neck pain.  Has some "weird sensation" across frontal head but no a/w N/V or pain.  Denies any ataxia or imbalance.  No focal weakness.  No visual changes.      PAST MEDICAL & SURGICAL HISTORY:  RA (rheumatoid arthritis)  Chronic GERD  Bladder cancer  Benign ovarian cyst  RIGHT OVARY REMOVED  History of cholecystectomy  History of appendectomy  History of hip replacement, total, right    H/O bilateral cataract extraction  H/O transurethral resection of bladder tumor (TURBT)    FAMILY HISTORY:  FHx: pancreatic cancer (Father)    Social History: (-) x 3    Allergies    No Known Allergies    Intolerances    MEDICATIONS  (STANDING):  chlorhexidine 4% Liquid 1 Application(s) Topical <User Schedule>  enoxaparin Injectable 40 milliGRAM(s) SubCutaneous every 24 hours  folic acid 1 milliGRAM(s) Oral daily  methotrexate 15 milliGRAM(s) Oral <User Schedule>  pantoprazole    Tablet 40 milliGRAM(s) Oral before breakfast  sodium chloride 0.9%. 1000 milliLiter(s) (75 mL/Hr) IV Continuous <Continuous>    MEDICATIONS  (PRN):  acetaminophen     Tablet .. 650 milliGRAM(s) Oral every 6 hours PRN Temp greater or equal to 38C (100.4F), Mild Pain (1 - 3)  aluminum hydroxide/magnesium hydroxide/simethicone Suspension 30 milliLiter(s) Oral every 4 hours PRN Dyspepsia  meclizine 25 milliGRAM(s) Oral every 6 hours PRN Dizziness  melatonin 3 milliGRAM(s) Oral at bedtime PRN Insomnia  ondansetron Injectable 4 milliGRAM(s) IV Push every 8 hours PRN Nausea and/or Vomiting    Review of systems:    Constitutional: as per HPI  Eyes: No eye pain or discharge  ENMT:  No difficulty hearing; No sinus or throat pain  Neck: No pain or stiffness  Respiratory: No cough, wheezing, chills or hemoptysis  Cardiovascular: No chest pain, palpitations, shortness of breath, dyspnea on exertion  Gastrointestinal: No abdominal pain, nausea, vomiting or hematemesis; No diarrhea or constipation.   Genitourinary: No dysuria, frequency, hematuria or incontinence  Neurological: As per HPI  Skin: No rashes or lesions   Endocrine: No heat or cold intolerance; No hair loss  Musculoskeletal: No joint pain or swelling  Psychiatric: No depression, anxiety, mood swings  Heme/Lymph: No easy bruising or bleeding gums    Vital Signs Last 24 Hrs  T(C): 36.2 (02 Mar 2023 04:50), Max: 36.6 (01 Mar 2023 13:50)  T(F): 97.1 (02 Mar 2023 04:50), Max: 97.8 (01 Mar 2023 13:50)  HR: 59 (02 Mar 2023 04:50) (59 - 76)  BP: 116/66 (02 Mar 2023 04:50) (116/66 - 177/77)  BP(mean): --  RR: 18 (02 Mar 2023 04:50) (18 - 20)  SpO2: 97% (02 Mar 2023 04:50) (95% - 99%)    Parameters below as of 02 Mar 2023 04:50  Patient On (Oxygen Delivery Method): room air    Examination:  General:  Appearance is consistent with chronologic age.  No abnormal facies.  Gross skin survey within normal limits.    Cognitive/Language:  The patient is oriented to person, place, time and date.  Recent and remote memory intact.  Fund of knowledge is intact and normal.  Language with normal repetition, comprehension and naming.  Nondysarthric.    Eyes: intact VA, VFF.  EOMI w/ b/l horizontal nystamus end-gaze, no nystagmus primary gaze or vertical gaze, no skew or reported double vision.  PERRL.  No ptosis/weakness of eyelid closure.    Face:  Facial sensation normal V1 - 3, no facial asymmetry.    Ears/Nose/Throat:  Hearing grossly intact b/l.  Palate elevates midline.  Tongue and uvula midline.   Motor examination:   Normal tone, bulk and range of motion.  No tenderness, twitching, tremors or involuntary movements.  Formal Muscle Strength Testing: (MRC grade R/L) 5/5 UE; 5/5 LE.  No observable drift.  Reflexes:   2+ b/l pectoralis, biceps, triceps, brachioradialis, patella and Achilles.  Plantar response downgoing b/l.  Jaw jerk, Kel, clonus absent.  Sensory examination:   Intact to light touch and pinprick, pain, temperature and proprioception and vibration in all extremities.  Cerebellum:   FTN/HKS intact with normal BRITTANY in all limbs.  No dysmetria or dysdiadokinesia.      NIHSS 0    Labs:   CBC Full  -  ( 02 Mar 2023 06:11 )  WBC Count : 7.39 K/uL  RBC Count : 3.53 M/uL  Hemoglobin : 11.6 g/dL  Hematocrit : 35.6 %  Platelet Count - Automated : 125 K/uL  Mean Cell Volume : 100.8 fL  Mean Cell Hemoglobin : 32.9 pg  Mean Cell Hemoglobin Concentration : 32.6 g/dL  Auto Neutrophil # : 5.03 K/uL  Auto Lymphocyte # : 1.53 K/uL  Auto Monocyte # : 0.64 K/uL  Auto Eosinophil # : 0.13 K/uL  Auto Basophil # : 0.04 K/uL  Auto Neutrophil % : 68.0 %  Auto Lymphocyte % : 20.7 %  Auto Monocyte % : 8.7 %  Auto Eosinophil % : 1.8 %  Auto Basophil % : 0.5 %    03-02    144  |  112<H>  |  19  ----------------------------<  87  4.1   |  23  |  1.0    Ca    8.6      02 Mar 2023 06:11  Mg     2.1     03-02    TPro  5.3<L>  /  Alb  3.4<L>  /  TBili  0.2  /  DBili  x   /  AST  15  /  ALT  12  /  AlkPhos  68  03-02    LIVER FUNCTIONS - ( 02 Mar 2023 06:11 )  Alb: 3.4 g/dL / Pro: 5.3 g/dL / ALK PHOS: 68 U/L / ALT: 12 U/L / AST: 15 U/L / GGT: x           PT/INR - ( 01 Mar 2023 13:50 )   PT: 11.70 sec;   INR: 1.03 ratio         PTT - ( 01 Mar 2023 13:50 )  PTT:33.0 sec  Urinalysis Basic - ( 02 Mar 2023 01:30 )    Color: Yellow / Appearance: Clear / S.015 / pH: x  Gluc: x / Ketone: Negative  / Bili: Negative / Urobili: 0.2 mg/dL   Blood: x / Protein: Negative mg/dL / Nitrite: Negative   Leuk Esterase: Negative / RBC: 1-2 /HPF / WBC 1-2 /HPF   Sq Epi: x / Non Sq Epi: Occasional /HPF / Bacteria: x      Neuroimaging:  NCHCT: CT Head No Cont:   ACC: [id number] EXAM:  CT BRAIN   ORDERED BY: FRANCES DAO     PROCEDURE DATE  2023      INTERPRETATION:  Clinical History / Reason for exam: Dizziness for 3 days    Technique: Noncontrast head CT.  Contiguous unenhanced CT axial images of   the head from the base to the vertex with coronal and sagittal reformats.    Comparison: None available    Findings:    There is mild motion limitation.    The ventricles and cortical sulci are compatible with a moderate degree   of parenchymalvolume loss.    Patchy lucencies within the cerebral hemispheric white matter are most   compatible with chronic microvascular changes.    There is no acute intracranial hemorrhage, extra-axial fluid collection   or midline shift.  Gray-white matter differentiation is maintained.   Vascular calcifications are noted.    The visualized paranasal sinuses and mastoids are clear. The patient is   status post bilateral cataract surgery.    IMPRESSION:    1.  Mild motion limitation. No evidence of acuteintracranial pathology.    2.  Moderate chronic microvascular changes.    --- End of Report ---    VANI GOLDSTEIN MD; Attending Radiologist  This document has been electronically signed. Mar  1 2023  4:27PM (23 @ 15:49)    < from: CT Angio Neck w/ IV Cont (23 @ 15:49) >  IMPRESSION:    1.  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    2.  Additional tiny (1.5 mm) saccular aneurysm arising from the left   posterior communicating artery (near the P2 junction).    3.  No evidence of major vascular stenosis or occlusion.    --- End of Report ---    VANI GOLDSTEIN MD; Attending Radiologist  This document has beenelectronically signed. Mar  1 2023  4:54PM    < end of copied text >

## 2023-03-02 NOTE — PHYSICAL THERAPY INITIAL EVALUATION ADULT - LEVEL OF INDEPENDENCE: STAIR NEGOTIATION, REHAB EVAL
Patient declined to perform stairs at this time but discussed safe techniques such as use of hand rail, step to step, and assistance as needed

## 2023-03-02 NOTE — CONSULT NOTE ADULT - ASSESSMENT
79y/o female PMH: RA, GERD, Bladder Cancer s/p resection p/w acute dizziness.  PComm aneurysm likely incidental.     Recommendations:   79y/o female PMH: RA, GERD, Bladder Cancer s/p resection p/w acute dizziness otherwise nonfocal but still symptomatic r/o posterior circulation CVA.  PComm aneurysm likely incidental.     Recommendations:  - Neuroendovascular following  - MRI Brain NC  - check orthostatics  - if MRI (-), no further inpt neurologic w/u and f/u with neuroendovascular recommendations

## 2023-03-02 NOTE — PHYSICAL THERAPY INITIAL EVALUATION ADULT - GENERAL OBSERVATIONS, REHAB EVAL
13:56-14:19 Chart reviewed. Pt encountered sitting in bedside chair and may be seen by Physical Therapist as confirmed with Nurse. Patient denied pain and discomfort at rest and agreed to therapy at this time. Intact +tele, +heplock R UE.

## 2023-03-02 NOTE — PHYSICAL THERAPY INITIAL EVALUATION ADULT - ADDITIONAL COMMENTS
Patient lives in a 2-family home, with her son in the other part of the house. To enter her house, she has 9 steps down with a R rail followed by 9 steps up with BL rails. Once inside, she stays on one level. Prior to admission, she was ambulating independently without any assistive device.

## 2023-03-03 ENCOUNTER — TRANSCRIPTION ENCOUNTER (OUTPATIENT)
Age: 78
End: 2023-03-03

## 2023-03-03 VITALS
RESPIRATION RATE: 16 BRPM | DIASTOLIC BLOOD PRESSURE: 85 MMHG | HEART RATE: 81 BPM | SYSTOLIC BLOOD PRESSURE: 124 MMHG | TEMPERATURE: 96 F | OXYGEN SATURATION: 98 %

## 2023-03-03 PROCEDURE — 70551 MRI BRAIN STEM W/O DYE: CPT | Mod: 26

## 2023-03-03 PROCEDURE — 99239 HOSP IP/OBS DSCHRG MGMT >30: CPT

## 2023-03-03 RX ORDER — PREGABALIN 225 MG/1
1000 CAPSULE ORAL DAILY
Refills: 0 | Status: DISCONTINUED | OUTPATIENT
Start: 2023-03-03 | End: 2023-03-03

## 2023-03-03 RX ORDER — FAMOTIDINE 10 MG/ML
2 INJECTION INTRAVENOUS
Qty: 60 | Refills: 0
Start: 2023-03-03 | End: 2023-04-01

## 2023-03-03 RX ORDER — FAMOTIDINE 10 MG/ML
20 INJECTION INTRAVENOUS DAILY
Refills: 0 | Status: DISCONTINUED | OUTPATIENT
Start: 2023-03-03 | End: 2023-03-03

## 2023-03-03 RX ORDER — METHOTREXATE 2.5 MG/1
0 TABLET ORAL
Qty: 0 | Refills: 0 | DISCHARGE

## 2023-03-03 RX ORDER — PREGABALIN 225 MG/1
1 CAPSULE ORAL
Qty: 30 | Refills: 0
Start: 2023-03-03 | End: 2023-04-01

## 2023-03-03 RX ADMIN — CHLORHEXIDINE GLUCONATE 1 APPLICATION(S): 213 SOLUTION TOPICAL at 05:15

## 2023-03-03 RX ADMIN — Medication 0.5 MILLIGRAM(S): at 10:20

## 2023-03-03 RX ADMIN — PANTOPRAZOLE SODIUM 40 MILLIGRAM(S): 20 TABLET, DELAYED RELEASE ORAL at 06:09

## 2023-03-03 NOTE — DISCHARGE NOTE PROVIDER - NSDCMRMEDTOKEN_GEN_ALL_CORE_FT
cyanocobalamin 1000 mcg oral tablet: 1 tab(s) orally once a day  famotidine 20 mg oral tablet: 2 tab(s) orally once a day   folic acid 1 mg oral tablet: 1 tab(s) orally once a day  methotrexate 2.5 mg oral tablet: 6 tab(s) orally once a week

## 2023-03-03 NOTE — DISCHARGE NOTE PROVIDER - HOSPITAL COURSE
Pt admitted for dizziness which has disappeared. CTA head and neck demonstrated a saccular 5.5mm right PCOMM aneurysm and saccular 1.5mm left PCOMM aneurysm. Similarly noted on the MRI. Neuroendovascular recommended no surgery for now and to keep systolic BP <140. Need to follow up with neuroendovascular in one week.

## 2023-03-03 NOTE — PROGRESS NOTE ADULT - SUBJECTIVE AND OBJECTIVE BOX
79 y/o female admitted with c/o lightheadedness that started a few days ago    Today:  Pt denies any current symptoms. She wants to leave after the MRI. Updated her and the daughter bedside.       REVIEW OF SYSTEMS:  as above      MEDICATIONS  (STANDING):  chlorhexidine 4% Liquid 1 Application(s) Topical <User Schedule>  enoxaparin Injectable 40 milliGRAM(s) SubCutaneous every 24 hours  folic acid 1 milliGRAM(s) Oral daily  methotrexate 15 milliGRAM(s) Oral <User Schedule>  pantoprazole    Tablet 40 milliGRAM(s) Oral before breakfast  sodium chloride 0.9%. 1000 milliLiter(s) (75 mL/Hr) IV Continuous <Continuous>    MEDICATIONS  (PRN):  acetaminophen     Tablet .. 650 milliGRAM(s) Oral every 6 hours PRN Temp greater or equal to 38C (100.4F), Mild Pain (1 - 3)  aluminum hydroxide/magnesium hydroxide/simethicone Suspension 30 milliLiter(s) Oral every 4 hours PRN Dyspepsia  melatonin 3 milliGRAM(s) Oral at bedtime PRN Insomnia  ondansetron Injectable 4 milliGRAM(s) IV Push every 8 hours PRN Nausea and/or Vomiting      Allergies  No Known Allergies        FAMILY HISTORY:  FHx: pancreatic cancer (Father)        Vital Signs Last 24 Hrs  T(C): 36.2 (02 Mar 2023 04:50), Max: 36.6 (01 Mar 2023 13:50)  T(F): 97.1 (02 Mar 2023 04:50), Max: 97.8 (01 Mar 2023 13:50)  HR: 59 (02 Mar 2023 04:50) (59 - 76)  BP: 116/66 (02 Mar 2023 04:50) (116/66 - 177/77)  RR: 18 (02 Mar 2023 04:50) (18 - 20)  SpO2: 97% (02 Mar 2023 04:50) (95% - 99%)    Parameters below as of 02 Mar 2023 04:50  Patient On (Oxygen Delivery Method): room air        PHYSICAL EXAM:  GENERAL: NAD, well-groomed, well-developed  HEAD:  Atraumatic, Normocephalic  EYES: EOMI, PERRLA, conjunctiva and sclera clear  ENMT: No tonsillar erythema, exudates, or enlargement; Moist mucous membranes, Good dentition, No lesions  NECK: Supple, No JVD, Normal thyroid  NERVOUS SYSTEM:  Alert & Oriented X3, Good concentration  CHEST/LUNG: CTA bilaterally; No rales, rhonchi, wheezing, or rubs  HEART: Regular rate and rhythm; No murmurs, rubs, or gallops  ABDOMEN: Soft, Nontender, Nondistended; Bowel sounds present  SKIN: No rashes or lesions      LABS:                        11.6   7.39  )-----------( 125      ( 02 Mar 2023 06:11 )             35.6     03-02    144  |  112<H>  |  19  ----------------------------<  87  4.1   |  23  |  1.0    Ca    8.6      02 Mar 2023 06:11  Mg     2.1     03-02    TPro  5.3<L>  /  Alb  3.4<L>  /  TBili  0.2  /  DBili  x   /  AST  15  /  ALT  12  /  AlkPhos  68  03-02    PT/INR - ( 01 Mar 2023 13:50 )   PT: 11.70 sec;   INR: 1.03 ratio         PTT - ( 01 Mar 2023 13:50 )  PTT:33.0 sec  Urinalysis Basic - ( 02 Mar 2023 01:30 )    Color: Yellow / Appearance: Clear / S.015 / pH: x  Gluc: x / Ketone: Negative  / Bili: Negative / Urobili: 0.2 mg/dL   Blood: x / Protein: Negative mg/dL / Nitrite: Negative   Leuk Esterase: Negative / RBC: 1-2 /HPF / WBC 1-2 /HPF   Sq Epi: x / Non Sq Epi: Occasional /HPF / Bacteria: x
79 y/o female admitted with c/o lightheadedness that started a few days ago    Today:  Seen at bedside, no new complaints.  Denies Vertigo...states she is lightheaded.            REVIEW OF SYSTEMS:  Lightheaded      MEDICATIONS  (STANDING):  chlorhexidine 4% Liquid 1 Application(s) Topical <User Schedule>  enoxaparin Injectable 40 milliGRAM(s) SubCutaneous every 24 hours  folic acid 1 milliGRAM(s) Oral daily  methotrexate 15 milliGRAM(s) Oral <User Schedule>  pantoprazole    Tablet 40 milliGRAM(s) Oral before breakfast  sodium chloride 0.9%. 1000 milliLiter(s) (75 mL/Hr) IV Continuous <Continuous>    MEDICATIONS  (PRN):  acetaminophen     Tablet .. 650 milliGRAM(s) Oral every 6 hours PRN Temp greater or equal to 38C (100.4F), Mild Pain (1 - 3)  aluminum hydroxide/magnesium hydroxide/simethicone Suspension 30 milliLiter(s) Oral every 4 hours PRN Dyspepsia  melatonin 3 milliGRAM(s) Oral at bedtime PRN Insomnia  ondansetron Injectable 4 milliGRAM(s) IV Push every 8 hours PRN Nausea and/or Vomiting      Allergies  No Known Allergies        FAMILY HISTORY:  FHx: pancreatic cancer (Father)        Vital Signs Last 24 Hrs  T(C): 36.2 (02 Mar 2023 04:50), Max: 36.6 (01 Mar 2023 13:50)  T(F): 97.1 (02 Mar 2023 04:50), Max: 97.8 (01 Mar 2023 13:50)  HR: 59 (02 Mar 2023 04:50) (59 - 76)  BP: 116/66 (02 Mar 2023 04:50) (116/66 - 177/77)  RR: 18 (02 Mar 2023 04:50) (18 - 20)  SpO2: 97% (02 Mar 2023 04:50) (95% - 99%)    Parameters below as of 02 Mar 2023 04:50  Patient On (Oxygen Delivery Method): room air        PHYSICAL EXAM:  GENERAL: NAD, well-groomed, well-developed  HEAD:  Atraumatic, Normocephalic  EYES: EOMI, PERRLA, conjunctiva and sclera clear  ENMT: No tonsillar erythema, exudates, or enlargement; Moist mucous membranes, Good dentition, No lesions  NECK: Supple, No JVD, Normal thyroid  NERVOUS SYSTEM:  Alert & Oriented X3, Good concentration  CHEST/LUNG: CTA bilaterally; No rales, rhonchi, wheezing, or rubs  HEART: Regular rate and rhythm; No murmurs, rubs, or gallops  ABDOMEN: Soft, Nontender, Nondistended; Bowel sounds present  SKIN: No rashes or lesions      LABS:                        11.6   7.39  )-----------( 125      ( 02 Mar 2023 06:11 )             35.6     03-02    144  |  112<H>  |  19  ----------------------------<  87  4.1   |  23  |  1.0    Ca    8.6      02 Mar 2023 06:11  Mg     2.1     03-02    TPro  5.3<L>  /  Alb  3.4<L>  /  TBili  0.2  /  DBili  x   /  AST  15  /  ALT  12  /  AlkPhos  68  03-02    PT/INR - ( 01 Mar 2023 13:50 )   PT: 11.70 sec;   INR: 1.03 ratio         PTT - ( 01 Mar 2023 13:50 )  PTT:33.0 sec  Urinalysis Basic - ( 02 Mar 2023 01:30 )    Color: Yellow / Appearance: Clear / S.015 / pH: x  Gluc: x / Ketone: Negative  / Bili: Negative / Urobili: 0.2 mg/dL   Blood: x / Protein: Negative mg/dL / Nitrite: Negative   Leuk Esterase: Negative / RBC: 1-2 /HPF / WBC 1-2 /HPF   Sq Epi: x / Non Sq Epi: Occasional /HPF / Bacteria: x

## 2023-03-03 NOTE — DISCHARGE NOTE NURSING/CASE MANAGEMENT/SOCIAL WORK - PATIENT PORTAL LINK FT
You can access the FollowMyHealth Patient Portal offered by [facility] by registering at the following website: [email or web address]. By joining [facility]’s FollowMyHealth portal, you will also be able to view your health information using other applications (apps) compatible with our system.

## 2023-03-03 NOTE — PROGRESS NOTE ADULT - ASSESSMENT
79 y/o female admitted with c/o lightheadedness that started a few days ago    Case discussed with Dr Bernal     Problem/Plan -:  ·  Problem: Cerebral aneurysm.   ·  Plan: Saccular aneurysm seen on  CT Scan:   Neuroendovascular surgery called and recommended Neurology see patient and if they feel he need Neuroendo vascular intervention she can be transferred [place].  Neurology consult appreciated, will need MR brain without contrast     Problem/Plan -:  ·  Problem: RA (rheumatoid arthritis).   ·  Plan: continue Methotrexate.     Problem/Plan -:  ·  Problem: Chronic GERD.   ·  Plan: continue Protonix.
 77 y/o female admitted with c/o lightheadedness      # Cerebral aneurysm.   - CTA neck: Saccular aneurysm arising from the right posterior communicating artery origin measuring up to 5.5 mm.  - MRI brain: redemonstrated the saccular aneurysm  - neuro and Neuroendovascular following  - can stop telemetry    #RA  - c/w MTX  - supplement with FA  - MCV elevated so will add B12    #GERD   pt is taking protonix and MTX but not recommended as it will increase MTX toxicity  - will recommend switching to pepcid    #CKD 3A  - stable, monitor    #proph  - vte: lovenox

## 2023-03-03 NOTE — DISCHARGE NOTE NURSING/CASE MANAGEMENT/SOCIAL WORK - NSDCPEFALRISK_GEN_ALL_CORE
For information on Fall & Injury Prevention, visit: [email or web address] OR  [email or web address] OR  https://www.cdc.gov/steadi/patient.html

## 2023-03-03 NOTE — DISCHARGE NOTE PROVIDER - ATTENDING DISCHARGE PHYSICAL EXAMINATION:
T(C): 35.6 (03-03-23 @ 14:28), Max: 36.4 (03-03-23 @ 05:00)  HR: 81 (03-03-23 @ 14:28) (60 - 81)  BP: 124/85 (03-03-23 @ 14:28) (124/85 - 138/60)  RR: 16 (03-03-23 @ 14:28) (16 - 18)  SpO2: 98% (03-03-23 @ 14:28) (94% - 98%)    CONSTITUTIONAL: Well groomed, no apparent distress  ENMT: Oral mucosa with moist membranes. Normal dentition; no pharyngeal injection or exudates             NECK: Supple, symmetric and without tracheal deviation   RESP: No respiratory distress, no use of accessory muscles; CTA b/l, no WRR  CV: RRR, +S1S2, no MRG; no JVD; no peripheral edema  GI: Soft, NT, ND, no rebound, no guarding; no palpable masses; no hepatosplenomegaly; no hernia palpated  MSK: Normal gait; No digital clubbing or cyanosis; examination of the (head/neck/spine/ribs/pelvis, RUE, LUE, RLE, LLE) without misalignment,            Normal ROM without pain, no spinal tenderness, normal muscle strength/tone  SKIN: No rashes or ulcers noted; no subcutaneous nodules or induration palpable  NEURO:  sensation intact in upper and lower extremities b/l to light touch   PSYCH:  A+O x 3, forgetful

## 2023-03-03 NOTE — CHART NOTE - NSCHARTNOTEFT_GEN_A_CORE
Pt asking for something to help calm her during MRI - will give Ativan 0.5mg Po     D/W Dr Wells  May go off tele for MRI

## 2023-03-03 NOTE — DISCHARGE NOTE PROVIDER - CARE PROVIDER_API CALL
Deejay Pizarro; [facility])  Surgery  Neurosurgery  [address] [place] [address]  Phone: [phone]  Fax: [phone]  Follow Up Time:

## 2023-03-03 NOTE — DISCHARGE NOTE PROVIDER - NSDCCPCAREPLAN_GEN_ALL_CORE_FT
PRINCIPAL DISCHARGE DIAGNOSIS  Diagnosis: Dizziness  Assessment and Plan of Treatment: Pt admitted for dizziness which has stopped. CTA head and neck demonstrated a saccular 5.5mm right PCOMM aneurysm and saccular 1.5mm left PCOMM aneurysm. Similarly noted on the MRI. Neuroendovascular recommended no surgery for now and to keep systolic BP <140. Need to follow up with neuroendovascular with Dr. Pizarro in one week.      SECONDARY DISCHARGE DIAGNOSES  Diagnosis: RA (rheumatoid arthritis)  Assessment and Plan of Treatment: MTX will cause folic acid deficiency but when folic acid is replenished, it can cause a B12 deficiency as noted by your high MCV lab so please take B12 and folic acid    Diagnosis: Chronic GERD  Assessment and Plan of Treatment: Do not take protonix and MTX together as it can increase the toxicity of MTX. Stopped pantoprazole and started pepcid which does not affect MTX

## 2023-03-03 NOTE — DISCHARGE NOTE PROVIDER - EXTENDED VTE YES NO FOR MLM ENOXAPARIN
, Keep the cast/splint/dressing clean and dry/Care for catheter as per unit/ICU protocols/Verbal/written post procedure instructions were given to patient/caregiver

## 2023-03-08 ENCOUNTER — APPOINTMENT (OUTPATIENT)
Dept: NEUROSURGERY | Facility: CLINIC | Age: 78
End: 2023-03-08

## 2023-03-08 PROBLEM — C67.9 MALIGNANT NEOPLASM OF BLADDER, UNSPECIFIED: Chronic | Status: ACTIVE | Noted: 2023-03-01

## 2023-03-08 PROBLEM — K21.9 GASTRO-ESOPHAGEAL REFLUX DISEASE WITHOUT ESOPHAGITIS: Chronic | Status: ACTIVE | Noted: 2023-03-01

## 2023-03-10 ENCOUNTER — APPOINTMENT (OUTPATIENT)
Dept: NEUROSURGERY | Facility: CLINIC | Age: 78
End: 2023-03-10
Payer: MEDICARE

## 2023-03-10 VITALS — WEIGHT: 138 LBS | HEIGHT: 65 IN | BODY MASS INDEX: 22.99 KG/M2

## 2023-03-10 DIAGNOSIS — I67.1 CEREBRAL ANEURYSM, NONRUPTURED: ICD-10-CM

## 2023-03-10 DIAGNOSIS — Z63.4 DISAPPEARANCE AND DEATH OF FAMILY MEMBER: ICD-10-CM

## 2023-03-10 PROCEDURE — XXXXX: CPT | Mod: 1L

## 2023-03-10 SDOH — SOCIAL STABILITY - SOCIAL INSECURITY: DISSAPEARANCE AND DEATH OF FAMILY MEMBER: Z63.4

## 2023-03-13 ENCOUNTER — NON-APPOINTMENT (OUTPATIENT)
Age: 78
End: 2023-03-13

## 2023-04-14 ENCOUNTER — OUTPATIENT (OUTPATIENT)
Dept: OUTPATIENT SERVICES | Facility: HOSPITAL | Age: 78
LOS: 1 days | End: 2023-04-14
Payer: MEDICARE

## 2023-04-14 DIAGNOSIS — Z90.49 ACQUIRED ABSENCE OF OTHER SPECIFIED PARTS OF DIGESTIVE TRACT: Chronic | ICD-10-CM

## 2023-04-14 DIAGNOSIS — R42 DIZZINESS AND GIDDINESS: ICD-10-CM

## 2023-04-14 DIAGNOSIS — M06.9 RHEUMATOID ARTHRITIS, UNSPECIFIED: ICD-10-CM

## 2023-04-14 DIAGNOSIS — N83.209 UNSPECIFIED OVARIAN CYST, UNSPECIFIED SIDE: Chronic | ICD-10-CM

## 2023-04-14 DIAGNOSIS — Z98.890 OTHER SPECIFIED POSTPROCEDURAL STATES: Chronic | ICD-10-CM

## 2023-04-14 DIAGNOSIS — Z98.41 CATARACT EXTRACTION STATUS, RIGHT EYE: Chronic | ICD-10-CM

## 2023-04-14 DIAGNOSIS — Z96.641 PRESENCE OF RIGHT ARTIFICIAL HIP JOINT: Chronic | ICD-10-CM

## 2023-04-14 PROCEDURE — 72050 X-RAY EXAM NECK SPINE 4/5VWS: CPT | Mod: 26

## 2023-04-14 PROCEDURE — 72050 X-RAY EXAM NECK SPINE 4/5VWS: CPT

## 2023-04-15 DIAGNOSIS — R42 DIZZINESS AND GIDDINESS: ICD-10-CM

## 2023-04-15 DIAGNOSIS — M06.9 RHEUMATOID ARTHRITIS, UNSPECIFIED: ICD-10-CM

## 2023-07-16 NOTE — HISTORY OF PRESENT ILLNESS
[FreeTextEntry1] : This is a 78-year-old female who presents for neuro endovascular consultation accompanied by a family member.  Patient reports a recent bout of weakness and dizziness with a propensity to sway when ambulating.  Symptoms have persisted over several days to weeks and ultimately she presented to the local emergency department for care and treatment.  CT angio head and neck completed at that time determined that there was evidence of mirror aneurysms involving the posterior communicating artery, right measured at 5.5 mm and the left measured at 2 mm approximately.\par \par Patient presents at this time noting continued dizziness which persists throughout the day.  She recalls an event over the past evening where she awoke from sleep with reported dizziness and feeling as though she was going to pass out.  Additional work-up remains ongoing with regards to questionable cardiac contribution to such complaints.\par \par Patient denies family history of cerebral aneurysm.  She denies headache, palpitations, head pressure, diplopia, change in vision, or recent ENT work-up or treatment.

## 2023-07-16 NOTE — ASSESSMENT
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  We have discussed with the patient and her family the indication for diagnostic cerebral angiogram for additional diagnostic measure in order to determine the morphology with more accuracy,\par \par Patient wishes to consider scheduling the diagnostic angiogram and will contact the office with any questions or concerns in the interim.\par \par I have had our surgical coordinator, Lori, discuss potential dates with the patient at this time.\par \par Ya Recinos PA-C\par Deejay Pizarro MD

## 2023-08-13 ENCOUNTER — INPATIENT (INPATIENT)
Facility: HOSPITAL | Age: 78
LOS: 4 days | Discharge: NURSING, REHAB OR LONG-TERM CARE | DRG: 481 | End: 2023-08-18
Attending: STUDENT IN AN ORGANIZED HEALTH CARE EDUCATION/TRAINING PROGRAM | Admitting: INTERNAL MEDICINE
Payer: MEDICARE

## 2023-08-13 VITALS
RESPIRATION RATE: 18 BRPM | OXYGEN SATURATION: 97 % | SYSTOLIC BLOOD PRESSURE: 143 MMHG | WEIGHT: 136.03 LBS | DIASTOLIC BLOOD PRESSURE: 92 MMHG | TEMPERATURE: 99 F | HEART RATE: 76 BPM | HEIGHT: 65 IN

## 2023-08-13 DIAGNOSIS — N83.209 UNSPECIFIED OVARIAN CYST, UNSPECIFIED SIDE: Chronic | ICD-10-CM

## 2023-08-13 DIAGNOSIS — Z90.49 ACQUIRED ABSENCE OF OTHER SPECIFIED PARTS OF DIGESTIVE TRACT: Chronic | ICD-10-CM

## 2023-08-13 DIAGNOSIS — S72.002A FRACTURE OF UNSPECIFIED PART OF NECK OF LEFT FEMUR, INITIAL ENCOUNTER FOR CLOSED FRACTURE: ICD-10-CM

## 2023-08-13 DIAGNOSIS — Z96.641 PRESENCE OF RIGHT ARTIFICIAL HIP JOINT: Chronic | ICD-10-CM

## 2023-08-13 DIAGNOSIS — Z98.890 OTHER SPECIFIED POSTPROCEDURAL STATES: Chronic | ICD-10-CM

## 2023-08-13 DIAGNOSIS — Z98.41 CATARACT EXTRACTION STATUS, RIGHT EYE: Chronic | ICD-10-CM

## 2023-08-13 LAB
ALBUMIN SERPL ELPH-MCNC: 4.4 G/DL — SIGNIFICANT CHANGE UP (ref 3.5–5.2)
ALP SERPL-CCNC: 83 U/L — SIGNIFICANT CHANGE UP (ref 30–115)
ALT FLD-CCNC: 30 U/L — SIGNIFICANT CHANGE UP (ref 0–41)
ANION GAP SERPL CALC-SCNC: 10 MMOL/L — SIGNIFICANT CHANGE UP (ref 7–14)
APTT BLD: 29.1 SEC — SIGNIFICANT CHANGE UP (ref 27–39.2)
AST SERPL-CCNC: 49 U/L — HIGH (ref 0–41)
BASOPHILS # BLD AUTO: 0.02 K/UL — SIGNIFICANT CHANGE UP (ref 0–0.2)
BASOPHILS NFR BLD AUTO: 0.2 % — SIGNIFICANT CHANGE UP (ref 0–1)
BILIRUB SERPL-MCNC: 0.5 MG/DL — SIGNIFICANT CHANGE UP (ref 0.2–1.2)
BLD GP AB SCN SERPL QL: SIGNIFICANT CHANGE UP
BUN SERPL-MCNC: 21 MG/DL — HIGH (ref 10–20)
CALCIUM SERPL-MCNC: 9.6 MG/DL — SIGNIFICANT CHANGE UP (ref 8.4–10.5)
CHLORIDE SERPL-SCNC: 104 MMOL/L — SIGNIFICANT CHANGE UP (ref 98–110)
CK SERPL-CCNC: 73 U/L — SIGNIFICANT CHANGE UP (ref 0–225)
CO2 SERPL-SCNC: 23 MMOL/L — SIGNIFICANT CHANGE UP (ref 17–32)
CREAT SERPL-MCNC: 1.1 MG/DL — SIGNIFICANT CHANGE UP (ref 0.7–1.5)
EGFR: 51 ML/MIN/1.73M2 — LOW
EOSINOPHIL # BLD AUTO: 0.02 K/UL — SIGNIFICANT CHANGE UP (ref 0–0.7)
EOSINOPHIL NFR BLD AUTO: 0.2 % — SIGNIFICANT CHANGE UP (ref 0–8)
GLUCOSE SERPL-MCNC: 96 MG/DL — SIGNIFICANT CHANGE UP (ref 70–99)
HCT VFR BLD CALC: 40.8 % — SIGNIFICANT CHANGE UP (ref 37–47)
HGB BLD-MCNC: 13.6 G/DL — SIGNIFICANT CHANGE UP (ref 12–16)
IMM GRANULOCYTES NFR BLD AUTO: 0.7 % — HIGH (ref 0.1–0.3)
INR BLD: 1 RATIO — SIGNIFICANT CHANGE UP (ref 0.65–1.3)
LYMPHOCYTES # BLD AUTO: 0.72 K/UL — LOW (ref 1.2–3.4)
LYMPHOCYTES # BLD AUTO: 7.1 % — LOW (ref 20.5–51.1)
MCHC RBC-ENTMCNC: 32.2 PG — HIGH (ref 27–31)
MCHC RBC-ENTMCNC: 33.3 G/DL — SIGNIFICANT CHANGE UP (ref 32–37)
MCV RBC AUTO: 96.5 FL — SIGNIFICANT CHANGE UP (ref 81–99)
MONOCYTES # BLD AUTO: 0.26 K/UL — SIGNIFICANT CHANGE UP (ref 0.1–0.6)
MONOCYTES NFR BLD AUTO: 2.6 % — SIGNIFICANT CHANGE UP (ref 1.7–9.3)
NEUTROPHILS # BLD AUTO: 9.04 K/UL — HIGH (ref 1.4–6.5)
NEUTROPHILS NFR BLD AUTO: 89.2 % — HIGH (ref 42.2–75.2)
NRBC # BLD: 0 /100 WBCS — SIGNIFICANT CHANGE UP (ref 0–0)
PLATELET # BLD AUTO: 149 K/UL — SIGNIFICANT CHANGE UP (ref 130–400)
PMV BLD: 12 FL — HIGH (ref 7.4–10.4)
POTASSIUM SERPL-MCNC: 5.1 MMOL/L — HIGH (ref 3.5–5)
POTASSIUM SERPL-SCNC: 5.1 MMOL/L — HIGH (ref 3.5–5)
PROT SERPL-MCNC: 6.7 G/DL — SIGNIFICANT CHANGE UP (ref 6–8)
PROTHROM AB SERPL-ACNC: 11.4 SEC — SIGNIFICANT CHANGE UP (ref 9.95–12.87)
RBC # BLD: 4.23 M/UL — SIGNIFICANT CHANGE UP (ref 4.2–5.4)
RBC # FLD: 13.1 % — SIGNIFICANT CHANGE UP (ref 11.5–14.5)
SODIUM SERPL-SCNC: 137 MMOL/L — SIGNIFICANT CHANGE UP (ref 135–146)
WBC # BLD: 10.13 K/UL — SIGNIFICANT CHANGE UP (ref 4.8–10.8)
WBC # FLD AUTO: 10.13 K/UL — SIGNIFICANT CHANGE UP (ref 4.8–10.8)

## 2023-08-13 PROCEDURE — 73552 X-RAY EXAM OF FEMUR 2/>: CPT | Mod: 26,LT

## 2023-08-13 PROCEDURE — 86850 RBC ANTIBODY SCREEN: CPT

## 2023-08-13 PROCEDURE — 85025 COMPLETE CBC W/AUTO DIFF WBC: CPT

## 2023-08-13 PROCEDURE — 73562 X-RAY EXAM OF KNEE 3: CPT | Mod: 26,LT

## 2023-08-13 PROCEDURE — 80048 BASIC METABOLIC PNL TOTAL CA: CPT

## 2023-08-13 PROCEDURE — C1713: CPT

## 2023-08-13 PROCEDURE — 97116 GAIT TRAINING THERAPY: CPT | Mod: GP

## 2023-08-13 PROCEDURE — 85027 COMPLETE CBC AUTOMATED: CPT

## 2023-08-13 PROCEDURE — 97166 OT EVAL MOD COMPLEX 45 MIN: CPT | Mod: GO

## 2023-08-13 PROCEDURE — 86900 BLOOD TYPING SEROLOGIC ABO: CPT

## 2023-08-13 PROCEDURE — 82550 ASSAY OF CK (CPK): CPT

## 2023-08-13 PROCEDURE — 97162 PT EVAL MOD COMPLEX 30 MIN: CPT | Mod: GP

## 2023-08-13 PROCEDURE — 72170 X-RAY EXAM OF PELVIS: CPT | Mod: 26,XS

## 2023-08-13 PROCEDURE — 83735 ASSAY OF MAGNESIUM: CPT

## 2023-08-13 PROCEDURE — 97110 THERAPEUTIC EXERCISES: CPT | Mod: GP

## 2023-08-13 PROCEDURE — 99285 EMERGENCY DEPT VISIT HI MDM: CPT

## 2023-08-13 PROCEDURE — 93010 ELECTROCARDIOGRAM REPORT: CPT

## 2023-08-13 PROCEDURE — 36415 COLL VENOUS BLD VENIPUNCTURE: CPT

## 2023-08-13 PROCEDURE — 73502 X-RAY EXAM HIP UNI 2-3 VIEWS: CPT | Mod: LT

## 2023-08-13 PROCEDURE — C9399: CPT

## 2023-08-13 PROCEDURE — 71045 X-RAY EXAM CHEST 1 VIEW: CPT | Mod: 26

## 2023-08-13 PROCEDURE — 86901 BLOOD TYPING SEROLOGIC RH(D): CPT

## 2023-08-13 PROCEDURE — 72192 CT PELVIS W/O DYE: CPT

## 2023-08-13 PROCEDURE — 73502 X-RAY EXAM HIP UNI 2-3 VIEWS: CPT | Mod: 26,LT

## 2023-08-13 PROCEDURE — 80053 COMPREHEN METABOLIC PANEL: CPT

## 2023-08-13 RX ORDER — HYDROMORPHONE HYDROCHLORIDE 2 MG/ML
0.5 INJECTION INTRAMUSCULAR; INTRAVENOUS; SUBCUTANEOUS EVERY 6 HOURS
Refills: 0 | Status: DISCONTINUED | OUTPATIENT
Start: 2023-08-13 | End: 2023-08-13

## 2023-08-13 RX ORDER — MORPHINE SULFATE 50 MG/1
4 CAPSULE, EXTENDED RELEASE ORAL EVERY 4 HOURS
Refills: 0 | Status: DISCONTINUED | OUTPATIENT
Start: 2023-08-13 | End: 2023-08-13

## 2023-08-13 RX ORDER — MORPHINE SULFATE 50 MG/1
2 CAPSULE, EXTENDED RELEASE ORAL ONCE
Refills: 0 | Status: DISCONTINUED | OUTPATIENT
Start: 2023-08-13 | End: 2023-08-13

## 2023-08-13 RX ORDER — ACETAMINOPHEN 500 MG
650 TABLET ORAL EVERY 6 HOURS
Refills: 0 | Status: DISCONTINUED | OUTPATIENT
Start: 2023-08-13 | End: 2023-08-14

## 2023-08-13 RX ORDER — HYDROMORPHONE HYDROCHLORIDE 2 MG/ML
0.5 INJECTION INTRAMUSCULAR; INTRAVENOUS; SUBCUTANEOUS EVERY 6 HOURS
Refills: 0 | Status: DISCONTINUED | OUTPATIENT
Start: 2023-08-13 | End: 2023-08-14

## 2023-08-13 RX ORDER — METHOTREXATE 2.5 MG/1
15 TABLET ORAL
Refills: 0 | Status: DISCONTINUED | OUTPATIENT
Start: 2023-08-13 | End: 2023-08-14

## 2023-08-13 RX ORDER — KETOROLAC TROMETHAMINE 30 MG/ML
15 SYRINGE (ML) INJECTION ONCE
Refills: 0 | Status: DISCONTINUED | OUTPATIENT
Start: 2023-08-13 | End: 2023-08-13

## 2023-08-13 RX ORDER — HEPARIN SODIUM 5000 [USP'U]/ML
5000 INJECTION INTRAVENOUS; SUBCUTANEOUS EVERY 8 HOURS
Refills: 0 | Status: DISCONTINUED | OUTPATIENT
Start: 2023-08-13 | End: 2023-08-14

## 2023-08-13 RX ORDER — LANOLIN ALCOHOL/MO/W.PET/CERES
3 CREAM (GRAM) TOPICAL AT BEDTIME
Refills: 0 | Status: DISCONTINUED | OUTPATIENT
Start: 2023-08-13 | End: 2023-08-14

## 2023-08-13 RX ORDER — FOLIC ACID 0.8 MG
1 TABLET ORAL
Refills: 0 | Status: DISCONTINUED | OUTPATIENT
Start: 2023-08-13 | End: 2023-08-14

## 2023-08-13 RX ORDER — ONDANSETRON 8 MG/1
4 TABLET, FILM COATED ORAL EVERY 8 HOURS
Refills: 0 | Status: DISCONTINUED | OUTPATIENT
Start: 2023-08-13 | End: 2023-08-14

## 2023-08-13 RX ORDER — SODIUM CHLORIDE 9 MG/ML
1000 INJECTION, SOLUTION INTRAVENOUS
Refills: 0 | Status: DISCONTINUED | OUTPATIENT
Start: 2023-08-13 | End: 2023-08-14

## 2023-08-13 RX ORDER — OMEGA-3 ACID ETHYL ESTERS 1 G
0 CAPSULE ORAL
Qty: 0 | Refills: 0 | DISCHARGE

## 2023-08-13 RX ORDER — MORPHINE SULFATE 50 MG/1
4 CAPSULE, EXTENDED RELEASE ORAL ONCE
Refills: 0 | Status: DISCONTINUED | OUTPATIENT
Start: 2023-08-13 | End: 2023-08-13

## 2023-08-13 RX ORDER — CHLORHEXIDINE GLUCONATE 213 G/1000ML
1 SOLUTION TOPICAL
Refills: 0 | Status: DISCONTINUED | OUTPATIENT
Start: 2023-08-13 | End: 2023-08-14

## 2023-08-13 RX ORDER — ACETAMINOPHEN 500 MG
650 TABLET ORAL ONCE
Refills: 0 | Status: COMPLETED | OUTPATIENT
Start: 2023-08-13 | End: 2023-08-13

## 2023-08-13 RX ADMIN — HEPARIN SODIUM 5000 UNIT(S): 5000 INJECTION INTRAVENOUS; SUBCUTANEOUS at 23:19

## 2023-08-13 RX ADMIN — Medication 650 MILLIGRAM(S): at 14:16

## 2023-08-13 RX ADMIN — HYDROMORPHONE HYDROCHLORIDE 0.5 MILLIGRAM(S): 2 INJECTION INTRAMUSCULAR; INTRAVENOUS; SUBCUTANEOUS at 17:11

## 2023-08-13 RX ADMIN — Medication 650 MILLIGRAM(S): at 21:48

## 2023-08-13 RX ADMIN — MORPHINE SULFATE 2 MILLIGRAM(S): 50 CAPSULE, EXTENDED RELEASE ORAL at 14:16

## 2023-08-13 RX ADMIN — HYDROMORPHONE HYDROCHLORIDE 0.5 MILLIGRAM(S): 2 INJECTION INTRAMUSCULAR; INTRAVENOUS; SUBCUTANEOUS at 23:49

## 2023-08-13 RX ADMIN — HYDROMORPHONE HYDROCHLORIDE 0.5 MILLIGRAM(S): 2 INJECTION INTRAMUSCULAR; INTRAVENOUS; SUBCUTANEOUS at 23:19

## 2023-08-13 RX ADMIN — Medication 650 MILLIGRAM(S): at 20:48

## 2023-08-13 RX ADMIN — Medication 3 MILLIGRAM(S): at 23:19

## 2023-08-13 RX ADMIN — MORPHINE SULFATE 2 MILLIGRAM(S): 50 CAPSULE, EXTENDED RELEASE ORAL at 16:06

## 2023-08-13 NOTE — H&P ADULT - NSHPPHYSICALEXAM_GEN_ALL_CORE
Vital Signs (24 Hrs):  T(C): 37.2 (08-13-23 @ 12:51), Max: 37.2 (08-13-23 @ 12:51)  HR: 76 (08-13-23 @ 12:51) (76 - 76)  BP: 143/92 (08-13-23 @ 12:51) (143/92 - 143/92)  RR: 18 (08-13-23 @ 12:51) (18 - 18)  SpO2: 97% (08-13-23 @ 12:51) (97% - 97%)    PHYSICAL EXAM:  GENERAL: NAD, well-developed  SKIN: No rashes or lesions  HEAD:  Atraumatic, Normocephalic  EYES: EOMI, PERRLA, conjunctiva and sclera clear  NECK: Supple, No JVD  CHEST/LUNG: Clear to auscultation bilaterally; No wheeze  HEART: Regular rate and rhythm; No murmurs, rubs, or gallops  ABDOMEN: Soft, Nontender, Nondistended; Bowel sounds present  EXTREMITIES:  No clubbing, cyanosis, or edema  CNS: AAOx3

## 2023-08-13 NOTE — ED PROVIDER NOTE - ATTENDING APP SHARED VISIT CONTRIBUTION OF CARE
Patienthere status post mechanical fall sustaining a left hip injury.  Patient found to have a left femoral neck fracture discussed with orthopedics patient will need admission to [facility] for operative intervention. Patient here status post mechanical fall sustaining a left hip injury.  Patient found to have a left femoral neck fracture discussed with orthopedics patient will need admission to [facility] for operative intervention.

## 2023-08-13 NOTE — H&P ADULT - ASSESSMENT
78F w/PMHx of RA on MTX,  Hx of bladder CA s/p TURBT by Dr. Best, 5.5cm right posterior communicating   artery aneurysm presents to ED s/p fall sustained just PTA found to have left femoral neck fracture and admitted to medicine service for further management.    #s/p fall with left femoral neck fracture  - admit to medicine service  - transfer [place] for Ortho evaluation  - Non-weight bearing LLE  - Pain control, Morphine 4mg IVP q4h PRN  - pre-op labs done; FU T&S  - spoke w/Ortho, can feed patient today.  NPO p MN tonight  - check CPK????    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 Best, 5.5cm right posterior communicating   artery aneurysm presents to ED s/p fall sustained just PTA found to have left femoral neck fracture and admitted to medicine service for further management.    #s/p fall with left femoral neck fracture  - admit to medicine service  - transfer [place] for Ortho evaluation  - Non-weight bearing LLE  - Pain control, Morphine 4mg IVP q4h PRN  - pre-op labs done; FU T&S  - spoke w/Ortho, can feed patient today.  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 Best, 5.5cm right posterior communicating   artery aneurysm presents to ED s/p fall sustained just PTA found to have left femoral neck fracture and admitted to medicine service for further management.    #s/p fall with left femoral neck fracture  - admit to medicine service  - transfer [place] for Ortho evaluation  - Non-weight bearing LLE  - Pain control, Morphine 4mg IVP q4h PRN  - pre-op labs done; FU T&S  - spoke w/Ortho, can feed patient today.  NPO p MN tonight  - check CPK (added to admit labs) and in AM  -Preoperative risk stratification: RCRI 0 - 3.9% risk of 30 day MI or cardiac arrest. METS > 4, JOLYNN 0.0%, Denies any chest pain, LOUIS, palpitations or shortness of breath. EKG reviewed No ischemic changes, NSR. Patient is moderate risk for high risk procedure.    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

## 2023-08-13 NOTE — ED PROVIDER NOTE - OBJECTIVE STATEMENT
pt with pmhx RA, GERD presents to ED c/o fall while doing laundry, landed on L hip. pain is sharp, nonradiating, moderate. denies exacerbating or relieving factors. Denies fever/chill/HA/dizziness/chest pain/palpitation/sob/abd pain/n/v/d/ black stool/bloody stool/urinary sxs

## 2023-08-13 NOTE — H&P ADULT - HISTORY OF PRESENT ILLNESS
78F w/PMHx of RA on MTX,  Hx of bladder CA s/p TURBT by Dr. Best, 5.5cm right posterior communicating   artery aneurysm presents to ED s/p fall sustained just PTA.  Patient reports was crouched and unloading her clothes washer when she tripped and fell over to her left side.  Patient experienced a large amount of pain and was unable to get up.  Patient was on floor approx 30m before she was found by a family member.  They were unable to get the patient up and activated EMS for transport to ED.  Patient with complaints of left hip pain, unable to straighten it or bare weight.  No CP/SOB.  No abdominal or urinary complaints.  Patient compliant with medicine and FU.     78F w/PMHx of RA on MTX, Hx of bladder CA s/p TURBT by Dr. 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  Patient reports was crouched and unloading her clothes washer when she tripped and fell over to her left side.  Patient experienced a large amount of pain and was unable to get up.  Patient was on floor approx 30m before she was found by a family member.  They were unable to get the patient up and activated EMS for transport to ED.  Patient with complaints of left hip pain, unable to straighten it or bare weight.  No CP/SOB.  No abdominal or urinary complaints.  Patient compliant with medicine and FU.

## 2023-08-13 NOTE — ED PROVIDER NOTE - PHYSICAL EXAMINATION
CONSTITUTIONAL: Well-appearing; well-nourished; in no apparent distress.   EYES: PERRL; EOM intact.   NECK: Supple; non-tender; no cervical lymphadenopathy.   CARDIOVASCULAR: Normal S1, S2; no murmurs, rubs, or gallops.   RESPIRATORY: Normal chest excursion with respiration; breath sounds clear and equal bilaterally; no wheezes, rhonchi, or rales.  GI/: Normal bowel sounds; non-distended; non-tender; no palpable organomegaly.   MS: limited rom L hip and ttp; otherwise normal ROM in all other joints/extremities; non-tender to palpation; distal pulses are normal.   SKIN: Normal for age and race; warm; dry; good turgor; no apparent lesions or exudate.   NEURO/PSYCH: A & O x 4; grossly unremarkable. mood and manner are appropriate.

## 2023-08-13 NOTE — H&P ADULT - NS ATTEND AMEND GEN_ALL_CORE FT
78F w/PMHx of RA on MTX, Hx of bladder CA s/p TURBT by Dr. 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  Patient reports was crouched and unloading her clothes washer when she tripped and fell over to her left side.  Patient experienced a large amount of pain and was unable to get up.  Patient was on floor approx 30m before she was found by a family member.  They were unable to get the patient up and activated EMS for transport to ED.  Patient with complaints of left hip pain, unable to straighten it or bare weight.  No CP/SOB.  No abdominal or urinary complaints.  Patient compliant with medicine and FU.      VITALS:   T(C): 37.2 (08-13-23 @ 12:51), Max: 37.2 (08-13-23 @ 12:51)  HR: 76 (08-13-23 @ 12:51) (76 - 76)  BP: 143/92 (08-13-23 @ 12:51) (143/92 - 143/92)  RR: 18 (08-13-23 @ 12:51) (18 - 18)  SpO2: 97% (08-13-23 @ 12:51) (97% - 97%)    GENERAL: NAD, anxious, appears in pain.  HEAD:  Atraumatic, normocephalic  EYES: EOMI, PERRLA, conjunctiva and sclera clear  ENT: Moist mucous membranes  NECK: Supple, no JVD  HEART: Regular rate and rhythm, no murmurs, rubs, or gallops  LUNGS: Unlabored respirations.  Clear to auscultation bilaterally, no crackles, wheezing, or rhonchi  ABDOMEN: Soft, nontender, nondistended, +BS  EXTREMITIES: 2+ peripheral pulses bilaterally. No clubbing, cyanosis, or edema  NERVOUS SYSTEM:  A&Ox3, no focal deficits   SKIN: No rashes or lesions 78F w/PMHx of RA on MTX, Hx of bladder CA s/p TURBT by Dr. 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  Patient reports was crouched and unloading her clothes washer when she tripped and fell over to her left side.  Patient experienced a large amount of pain and was unable to get up.  Patient was on floor approx 30m before she was found by a family member.  They were unable to get the patient up and activated EMS for transport to ED.  Patient with complaints of left hip pain, unable to straighten it or bare weight.  No CP/SOB.  No abdominal or urinary complaints.  Patient compliant with medicine and FU.      VITALS:   T(C): 37.2 (08-13-23 @ 12:51), Max: 37.2 (08-13-23 @ 12:51)  HR: 76 (08-13-23 @ 12:51) (76 - 76)  BP: 143/92 (08-13-23 @ 12:51) (143/92 - 143/92)  RR: 18 (08-13-23 @ 12:51) (18 - 18)  SpO2: 97% (08-13-23 @ 12:51) (97% - 97%)    GENERAL: NAD, anxious, appears in pain.  HEAD:  Atraumatic, normocephalic  EYES: EOMI, PERRLA, conjunctiva and sclera clear  ENT: Moist mucous membranes  NECK: Supple, no JVD  HEART: Regular rate and rhythm, no murmurs, rubs, or gallops  LUNGS: Unlabored respirations.  Clear to auscultation bilaterally, no crackles, wheezing, or rhonchi  ABDOMEN: Soft, nontender, nondistended, +BS  EXTREMITIES: Unable to examine due to pain  NERVOUS SYSTEM:  A&Ox3, no focal deficits   SKIN: No rashes or lesions    Assessment and Plan:    #s/p fall with left femoral neck fracture  - admit to medicine service  - transfer [place] for Ortho evaluation and possible OR in am  - Non-weight bearing LLE  - Pain control, Morphine 4mg IVP q4h PRN  - pre-op labs done; FU T&S  - spoke w/Ortho, can feed patient today.  NPO p MN tonight  -Preoperative risk stratification: RCRI 0 - 3.9% risk of 30 day MI or cardiac arrest. METS > 4, JOLYNN 0.0%, Denies any chest pain, LOUIS, palpitations or shortness of breath. EKG reviewed No ischemic changes, NSR. Patient is moderate risk for high risk procedure.    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

## 2023-08-13 NOTE — ED ADULT NURSE NOTE - NSFALLHARMRISKINTERV_ED_ALL_ED
Assistance OOB with selected safe patient handling equipment if applicable/Assistance with ambulation/Communicate risk of Fall with Harm to all staff, patient, and family/Monitor gait and stability/Provide visual cue: red socks, yellow wristband, yellow gown, etc/Reinforce activity limits and safety measures with patient and family/Bed in lowest position, wheels locked, appropriate side rails in place/Call bell, personal items and telephone in reach/Instruct patient to call for assistance before getting out of bed/chair/stretcher/Non-slip footwear applied when patient is off stretcher/[place] to call system/Physically safe environment - no spills, clutter or unnecessary equipment/Purposeful Proactive Rounding/Room/bathroom lighting operational, light cord in reach

## 2023-08-13 NOTE — H&P ADULT - NSICDXPASTSURGICALHX_GEN_ALL_CORE_FT
PAST SURGICAL HISTORY:  Benign ovarian cyst RIGHT OVARY REMOVED    H/O bilateral cataract extraction     H/O transurethral resection of bladder tumor (TURBT)     History of appendectomy     History of cholecystectomy     History of hip replacement, total, right 1998

## 2023-08-13 NOTE — ED PROVIDER NOTE - CLINICAL SUMMARY MEDICAL DECISION MAKING FREE TEXT BOX
Patient here status post mechanical fall sustaining a left hip injury.  Patient found to have a left femoral neck fracture discussed with orthopedics patient will need admission to [facility] for operative intervention.

## 2023-08-13 NOTE — H&P ADULT - NSHPLABSRESULTS_GEN_ALL_CORE
13.6   10.13 )-----------( 149      ( 13 Aug 2023 14:01 )             40.8       08-13    137  |  104  |  21<H>  ----------------------------<  96  5.1<H>   |  23  |  1.1    Ca    9.6      13 Aug 2023 14:01    TPro  6.7  /  Alb  4.4  /  TBili  0.5  /  DBili  x   /  AST  49<H>  /  ALT  30  /  AlkPhos  83  08-13          Urinalysis Basic - ( 13 Aug 2023 14:01 )    Color: x / Appearance: x / SG: x / pH: x  Gluc: 96 mg/dL / Ketone: x  / Bili: x / Urobili: x   Blood: x / Protein: x / Nitrite: x   Leuk Esterase: x / RBC: x / WBC x   Sq Epi: x / Non Sq Epi: x / Bacteria: x      PT/INR - ( 13 Aug 2023 14:01 )   PT: 11.40 sec;   INR: 1.00 ratio         PTT - ( 13 Aug 2023 14:01 )  PTT:29.1 sec    Lactate Trend      CXR, Xray left femur, Xray left hip, Xray left knee, Xray Pelvis AP pending official read

## 2023-08-13 NOTE — CONSULT NOTE ADULT - ATTENDING COMMENTS
pt seen and examined  left hip FN fracture  plan for crpp  r/b/a explained to pt and family, including avn and possible failure which would require arthroplasty in the future  all questions answered

## 2023-08-14 ENCOUNTER — TRANSCRIPTION ENCOUNTER (OUTPATIENT)
Age: 78
End: 2023-08-14

## 2023-08-14 LAB
ALBUMIN SERPL ELPH-MCNC: 3.6 G/DL — SIGNIFICANT CHANGE UP (ref 3.5–5.2)
ALP SERPL-CCNC: 108 U/L — SIGNIFICANT CHANGE UP (ref 30–115)
ALT FLD-CCNC: 270 U/L — HIGH (ref 0–41)
ANION GAP SERPL CALC-SCNC: 12 MMOL/L — SIGNIFICANT CHANGE UP (ref 7–14)
ANION GAP SERPL CALC-SCNC: 7 MMOL/L — SIGNIFICANT CHANGE UP (ref 7–14)
AST SERPL-CCNC: 238 U/L — HIGH (ref 0–41)
BASOPHILS # BLD AUTO: 0.02 K/UL — SIGNIFICANT CHANGE UP (ref 0–0.2)
BASOPHILS NFR BLD AUTO: 0.3 % — SIGNIFICANT CHANGE UP (ref 0–1)
BILIRUB SERPL-MCNC: 0.8 MG/DL — SIGNIFICANT CHANGE UP (ref 0.2–1.2)
BUN SERPL-MCNC: 15 MG/DL — SIGNIFICANT CHANGE UP (ref 10–20)
BUN SERPL-MCNC: 16 MG/DL — SIGNIFICANT CHANGE UP (ref 10–20)
CALCIUM SERPL-MCNC: 8.9 MG/DL — SIGNIFICANT CHANGE UP (ref 8.4–10.4)
CALCIUM SERPL-MCNC: 8.9 MG/DL — SIGNIFICANT CHANGE UP (ref 8.4–10.5)
CHLORIDE SERPL-SCNC: 105 MMOL/L — SIGNIFICANT CHANGE UP (ref 98–110)
CHLORIDE SERPL-SCNC: 106 MMOL/L — SIGNIFICANT CHANGE UP (ref 98–110)
CK SERPL-CCNC: 108 U/L — SIGNIFICANT CHANGE UP (ref 0–225)
CO2 SERPL-SCNC: 22 MMOL/L — SIGNIFICANT CHANGE UP (ref 17–32)
CO2 SERPL-SCNC: 25 MMOL/L — SIGNIFICANT CHANGE UP (ref 17–32)
CREAT SERPL-MCNC: 0.8 MG/DL — SIGNIFICANT CHANGE UP (ref 0.7–1.5)
CREAT SERPL-MCNC: 1.1 MG/DL — SIGNIFICANT CHANGE UP (ref 0.7–1.5)
EGFR: 51 ML/MIN/1.73M2 — LOW
EGFR: 75 ML/MIN/1.73M2 — SIGNIFICANT CHANGE UP
EOSINOPHIL # BLD AUTO: 0.05 K/UL — SIGNIFICANT CHANGE UP (ref 0–0.7)
EOSINOPHIL NFR BLD AUTO: 0.8 % — SIGNIFICANT CHANGE UP (ref 0–8)
GLUCOSE SERPL-MCNC: 89 MG/DL — SIGNIFICANT CHANGE UP (ref 70–99)
GLUCOSE SERPL-MCNC: 96 MG/DL — SIGNIFICANT CHANGE UP (ref 70–99)
HCT VFR BLD CALC: 34.8 % — LOW (ref 37–47)
HCT VFR BLD CALC: 37.7 % — SIGNIFICANT CHANGE UP (ref 37–47)
HGB BLD-MCNC: 11.7 G/DL — LOW (ref 12–16)
HGB BLD-MCNC: 12.7 G/DL — SIGNIFICANT CHANGE UP (ref 12–16)
IMM GRANULOCYTES NFR BLD AUTO: 0.3 % — SIGNIFICANT CHANGE UP (ref 0.1–0.3)
LYMPHOCYTES # BLD AUTO: 0.87 K/UL — LOW (ref 1.2–3.4)
LYMPHOCYTES # BLD AUTO: 14.7 % — LOW (ref 20.5–51.1)
MAGNESIUM SERPL-MCNC: 1.9 MG/DL — SIGNIFICANT CHANGE UP (ref 1.8–2.4)
MCHC RBC-ENTMCNC: 32.5 PG — HIGH (ref 27–31)
MCHC RBC-ENTMCNC: 33 PG — HIGH (ref 27–31)
MCHC RBC-ENTMCNC: 33.6 G/DL — SIGNIFICANT CHANGE UP (ref 32–37)
MCHC RBC-ENTMCNC: 33.7 G/DL — SIGNIFICANT CHANGE UP (ref 32–37)
MCV RBC AUTO: 96.7 FL — SIGNIFICANT CHANGE UP (ref 81–99)
MCV RBC AUTO: 97.9 FL — SIGNIFICANT CHANGE UP (ref 81–99)
MONOCYTES # BLD AUTO: 0.27 K/UL — SIGNIFICANT CHANGE UP (ref 0.1–0.6)
MONOCYTES NFR BLD AUTO: 4.6 % — SIGNIFICANT CHANGE UP (ref 1.7–9.3)
NEUTROPHILS # BLD AUTO: 4.7 K/UL — SIGNIFICANT CHANGE UP (ref 1.4–6.5)
NEUTROPHILS NFR BLD AUTO: 79.3 % — HIGH (ref 42.2–75.2)
NRBC # BLD: 0 /100 WBCS — SIGNIFICANT CHANGE UP (ref 0–0)
NRBC # BLD: 0 /100 WBCS — SIGNIFICANT CHANGE UP (ref 0–0)
PLATELET # BLD AUTO: 116 K/UL — LOW (ref 130–400)
PLATELET # BLD AUTO: 120 K/UL — LOW (ref 130–400)
PMV BLD: 12.2 FL — HIGH (ref 7.4–10.4)
PMV BLD: 12.8 FL — HIGH (ref 7.4–10.4)
POTASSIUM SERPL-MCNC: 4 MMOL/L — SIGNIFICANT CHANGE UP (ref 3.5–5)
POTASSIUM SERPL-MCNC: 4.2 MMOL/L — SIGNIFICANT CHANGE UP (ref 3.5–5)
POTASSIUM SERPL-SCNC: 4 MMOL/L — SIGNIFICANT CHANGE UP (ref 3.5–5)
POTASSIUM SERPL-SCNC: 4.2 MMOL/L — SIGNIFICANT CHANGE UP (ref 3.5–5)
PROT SERPL-MCNC: 5.5 G/DL — LOW (ref 6–8)
RBC # BLD: 3.6 M/UL — LOW (ref 4.2–5.4)
RBC # BLD: 3.85 M/UL — LOW (ref 4.2–5.4)
RBC # FLD: 13.2 % — SIGNIFICANT CHANGE UP (ref 11.5–14.5)
RBC # FLD: 13.2 % — SIGNIFICANT CHANGE UP (ref 11.5–14.5)
SODIUM SERPL-SCNC: 137 MMOL/L — SIGNIFICANT CHANGE UP (ref 135–146)
SODIUM SERPL-SCNC: 140 MMOL/L — SIGNIFICANT CHANGE UP (ref 135–146)
WBC # BLD: 5.93 K/UL — SIGNIFICANT CHANGE UP (ref 4.8–10.8)
WBC # BLD: 6.52 K/UL — SIGNIFICANT CHANGE UP (ref 4.8–10.8)
WBC # FLD AUTO: 5.93 K/UL — SIGNIFICANT CHANGE UP (ref 4.8–10.8)
WBC # FLD AUTO: 6.52 K/UL — SIGNIFICANT CHANGE UP (ref 4.8–10.8)

## 2023-08-14 PROCEDURE — 73502 X-RAY EXAM HIP UNI 2-3 VIEWS: CPT | Mod: 26,LT

## 2023-08-14 PROCEDURE — 72192 CT PELVIS W/O DYE: CPT | Mod: 26

## 2023-08-14 PROCEDURE — 27235 TREAT THIGH FRACTURE: CPT | Mod: LT

## 2023-08-14 PROCEDURE — 99232 SBSQ HOSP IP/OBS MODERATE 35: CPT

## 2023-08-14 RX ORDER — HYDROMORPHONE HYDROCHLORIDE 2 MG/ML
0.5 INJECTION INTRAMUSCULAR; INTRAVENOUS; SUBCUTANEOUS
Refills: 0 | Status: DISCONTINUED | OUTPATIENT
Start: 2023-08-14 | End: 2023-08-14

## 2023-08-14 RX ORDER — CEFAZOLIN SODIUM 1 G
2000 VIAL (EA) INJECTION EVERY 8 HOURS
Refills: 0 | Status: COMPLETED | OUTPATIENT
Start: 2023-08-14 | End: 2023-08-15

## 2023-08-14 RX ORDER — ACETAMINOPHEN 500 MG
650 TABLET ORAL EVERY 6 HOURS
Refills: 0 | Status: DISCONTINUED | OUTPATIENT
Start: 2023-08-14 | End: 2023-08-14

## 2023-08-14 RX ORDER — SODIUM CHLORIDE 9 MG/ML
1000 INJECTION, SOLUTION INTRAVENOUS
Refills: 0 | Status: DISCONTINUED | OUTPATIENT
Start: 2023-08-14 | End: 2023-08-14

## 2023-08-14 RX ORDER — ONDANSETRON 8 MG/1
4 TABLET, FILM COATED ORAL EVERY 8 HOURS
Refills: 0 | Status: DISCONTINUED | OUTPATIENT
Start: 2023-08-14 | End: 2023-08-17

## 2023-08-14 RX ORDER — FOLIC ACID 0.8 MG
1 TABLET ORAL
Refills: 0 | Status: DISCONTINUED | OUTPATIENT
Start: 2023-08-14 | End: 2023-08-18

## 2023-08-14 RX ORDER — OXYCODONE HYDROCHLORIDE 5 MG/1
5 TABLET ORAL ONCE
Refills: 0 | Status: DISCONTINUED | OUTPATIENT
Start: 2023-08-14 | End: 2023-08-14

## 2023-08-14 RX ORDER — CHLORHEXIDINE GLUCONATE 213 G/1000ML
1 SOLUTION TOPICAL
Refills: 0 | Status: DISCONTINUED | OUTPATIENT
Start: 2023-08-14 | End: 2023-08-18

## 2023-08-14 RX ORDER — HEPARIN SODIUM 5000 [USP'U]/ML
5000 INJECTION INTRAVENOUS; SUBCUTANEOUS EVERY 8 HOURS
Refills: 0 | Status: DISCONTINUED | OUTPATIENT
Start: 2023-08-14 | End: 2023-08-18

## 2023-08-14 RX ORDER — PANTOPRAZOLE SODIUM 20 MG/1
40 TABLET, DELAYED RELEASE ORAL
Refills: 0 | Status: DISCONTINUED | OUTPATIENT
Start: 2023-08-14 | End: 2023-08-18

## 2023-08-14 RX ORDER — LANOLIN ALCOHOL/MO/W.PET/CERES
3 CREAM (GRAM) TOPICAL AT BEDTIME
Refills: 0 | Status: DISCONTINUED | OUTPATIENT
Start: 2023-08-14 | End: 2023-08-18

## 2023-08-14 RX ORDER — HYDROMORPHONE HYDROCHLORIDE 2 MG/ML
0.5 INJECTION INTRAMUSCULAR; INTRAVENOUS; SUBCUTANEOUS EVERY 6 HOURS
Refills: 0 | Status: DISCONTINUED | OUTPATIENT
Start: 2023-08-14 | End: 2023-08-17

## 2023-08-14 RX ORDER — KETOROLAC TROMETHAMINE 30 MG/ML
15 SYRINGE (ML) INJECTION ONCE
Refills: 0 | Status: DISCONTINUED | OUTPATIENT
Start: 2023-08-14 | End: 2023-08-14

## 2023-08-14 RX ORDER — SODIUM CHLORIDE 9 MG/ML
1000 INJECTION, SOLUTION INTRAVENOUS
Refills: 0 | Status: DISCONTINUED | OUTPATIENT
Start: 2023-08-14 | End: 2023-08-17

## 2023-08-14 RX ADMIN — Medication 650 MILLIGRAM(S): at 11:05

## 2023-08-14 RX ADMIN — Medication 15 MILLIGRAM(S): at 18:23

## 2023-08-14 RX ADMIN — SODIUM CHLORIDE 100 MILLILITER(S): 9 INJECTION, SOLUTION INTRAVENOUS at 15:25

## 2023-08-14 RX ADMIN — HYDROMORPHONE HYDROCHLORIDE 0.5 MILLIGRAM(S): 2 INJECTION INTRAMUSCULAR; INTRAVENOUS; SUBCUTANEOUS at 06:00

## 2023-08-14 RX ADMIN — HYDROMORPHONE HYDROCHLORIDE 0.5 MILLIGRAM(S): 2 INJECTION INTRAMUSCULAR; INTRAVENOUS; SUBCUTANEOUS at 15:35

## 2023-08-14 RX ADMIN — Medication 1 MILLIGRAM(S): at 09:24

## 2023-08-14 RX ADMIN — HYDROMORPHONE HYDROCHLORIDE 0.5 MILLIGRAM(S): 2 INJECTION INTRAMUSCULAR; INTRAVENOUS; SUBCUTANEOUS at 15:25

## 2023-08-14 RX ADMIN — HYDROMORPHONE HYDROCHLORIDE 0.5 MILLIGRAM(S): 2 INJECTION INTRAMUSCULAR; INTRAVENOUS; SUBCUTANEOUS at 11:25

## 2023-08-14 RX ADMIN — HYDROMORPHONE HYDROCHLORIDE 0.5 MILLIGRAM(S): 2 INJECTION INTRAMUSCULAR; INTRAVENOUS; SUBCUTANEOUS at 05:28

## 2023-08-14 RX ADMIN — Medication 100 MILLIGRAM(S): at 22:01

## 2023-08-14 RX ADMIN — Medication 15 MILLIGRAM(S): at 18:35

## 2023-08-14 RX ADMIN — Medication 650 MILLIGRAM(S): at 04:12

## 2023-08-14 RX ADMIN — HYDROMORPHONE HYDROCHLORIDE 0.5 MILLIGRAM(S): 2 INJECTION INTRAMUSCULAR; INTRAVENOUS; SUBCUTANEOUS at 11:35

## 2023-08-14 RX ADMIN — HYDROMORPHONE HYDROCHLORIDE 0.5 MILLIGRAM(S): 2 INJECTION INTRAMUSCULAR; INTRAVENOUS; SUBCUTANEOUS at 22:03

## 2023-08-14 RX ADMIN — Medication 650 MILLIGRAM(S): at 05:12

## 2023-08-14 RX ADMIN — Medication 650 MILLIGRAM(S): at 10:10

## 2023-08-14 RX ADMIN — HYDROMORPHONE HYDROCHLORIDE 0.5 MILLIGRAM(S): 2 INJECTION INTRAMUSCULAR; INTRAVENOUS; SUBCUTANEOUS at 23:03

## 2023-08-14 RX ADMIN — SODIUM CHLORIDE 75 MILLILITER(S): 9 INJECTION, SOLUTION INTRAVENOUS at 00:00

## 2023-08-14 RX ADMIN — CHLORHEXIDINE GLUCONATE 1 APPLICATION(S): 213 SOLUTION TOPICAL at 05:29

## 2023-08-14 RX ADMIN — HYDROMORPHONE HYDROCHLORIDE 0.5 MILLIGRAM(S): 2 INJECTION INTRAMUSCULAR; INTRAVENOUS; SUBCUTANEOUS at 15:45

## 2023-08-14 RX ADMIN — HEPARIN SODIUM 5000 UNIT(S): 5000 INJECTION INTRAVENOUS; SUBCUTANEOUS at 22:05

## 2023-08-14 NOTE — PROGRESS NOTE ADULT - TIME BILLING
Total time spent to complete patient's bedside assessment, review medical chart, discuss medical plan of care with  medical team was more than 35 minutes  with >50% of time spent face to face with patient, discussion with patient and/or coordination of care.

## 2023-08-14 NOTE — PATIENT PROFILE ADULT - VISION (WITH CORRECTIVE LENSES IF THE PATIENT USUALLY WEARS THEM):
Pt uses eyeglasses/Normal vision: sees adequately in most situations; can see medication labels, newsprint

## 2023-08-14 NOTE — BRIEF OPERATIVE NOTE - OPERATION/FINDINGS
valgus impacted femoral neck fracture, left  Synthes 7.3, 16mm threaded cannulated screwsX3  Calcar screw 90mm  Superoanterior 80mm  Superoposterior 75mm  XRay in PACU  Postop cbc  WBAT LLE with walker  ANCEF 2gx3 doses  PT evaluation  Chemical DVT prophylaxis to begin tonight

## 2023-08-14 NOTE — BRIEF OPERATIVE NOTE - NSICDXBRIEFPROCEDURE_GEN_ALL_CORE_FT
PROCEDURES:  Closed reduction and percutaneous pinning of left hip 14-Aug-2023 15:05:16  Debra Patricio

## 2023-08-15 LAB
ALBUMIN SERPL ELPH-MCNC: 3.4 G/DL — LOW (ref 3.5–5.2)
ALP SERPL-CCNC: 98 U/L — SIGNIFICANT CHANGE UP (ref 30–115)
ALT FLD-CCNC: 137 U/L — HIGH (ref 0–41)
ANION GAP SERPL CALC-SCNC: 11 MMOL/L — SIGNIFICANT CHANGE UP (ref 7–14)
AST SERPL-CCNC: 99 U/L — HIGH (ref 0–41)
BILIRUB SERPL-MCNC: 0.3 MG/DL — SIGNIFICANT CHANGE UP (ref 0.2–1.2)
BLD GP AB SCN SERPL QL: SIGNIFICANT CHANGE UP
BUN SERPL-MCNC: 15 MG/DL — SIGNIFICANT CHANGE UP (ref 10–20)
CALCIUM SERPL-MCNC: 8.5 MG/DL — SIGNIFICANT CHANGE UP (ref 8.4–10.5)
CHLORIDE SERPL-SCNC: 107 MMOL/L — SIGNIFICANT CHANGE UP (ref 98–110)
CO2 SERPL-SCNC: 20 MMOL/L — SIGNIFICANT CHANGE UP (ref 17–32)
CREAT SERPL-MCNC: 0.9 MG/DL — SIGNIFICANT CHANGE UP (ref 0.7–1.5)
EGFR: 65 ML/MIN/1.73M2 — SIGNIFICANT CHANGE UP
GLUCOSE SERPL-MCNC: 95 MG/DL — SIGNIFICANT CHANGE UP (ref 70–99)
HCT VFR BLD CALC: 33.5 % — LOW (ref 37–47)
HGB BLD-MCNC: 11.1 G/DL — LOW (ref 12–16)
MCHC RBC-ENTMCNC: 32.8 PG — HIGH (ref 27–31)
MCHC RBC-ENTMCNC: 33.1 G/DL — SIGNIFICANT CHANGE UP (ref 32–37)
MCV RBC AUTO: 99.1 FL — HIGH (ref 81–99)
NRBC # BLD: 0 /100 WBCS — SIGNIFICANT CHANGE UP (ref 0–0)
PLATELET # BLD AUTO: 105 K/UL — LOW (ref 130–400)
PMV BLD: 12.4 FL — HIGH (ref 7.4–10.4)
POTASSIUM SERPL-MCNC: 4.3 MMOL/L — SIGNIFICANT CHANGE UP (ref 3.5–5)
POTASSIUM SERPL-SCNC: 4.3 MMOL/L — SIGNIFICANT CHANGE UP (ref 3.5–5)
PROT SERPL-MCNC: 5.2 G/DL — LOW (ref 6–8)
RBC # BLD: 3.38 M/UL — LOW (ref 4.2–5.4)
RBC # FLD: 13.3 % — SIGNIFICANT CHANGE UP (ref 11.5–14.5)
SODIUM SERPL-SCNC: 138 MMOL/L — SIGNIFICANT CHANGE UP (ref 135–146)
WBC # BLD: 6.66 K/UL — SIGNIFICANT CHANGE UP (ref 4.8–10.8)
WBC # FLD AUTO: 6.66 K/UL — SIGNIFICANT CHANGE UP (ref 4.8–10.8)

## 2023-08-15 PROCEDURE — 99232 SBSQ HOSP IP/OBS MODERATE 35: CPT

## 2023-08-15 RX ORDER — POLYETHYLENE GLYCOL 3350 17 G/17G
17 POWDER, FOR SOLUTION ORAL DAILY
Refills: 0 | Status: DISCONTINUED | OUTPATIENT
Start: 2023-08-15 | End: 2023-08-18

## 2023-08-15 RX ORDER — SENNA PLUS 8.6 MG/1
2 TABLET ORAL AT BEDTIME
Refills: 0 | Status: DISCONTINUED | OUTPATIENT
Start: 2023-08-15 | End: 2023-08-18

## 2023-08-15 RX ADMIN — SODIUM CHLORIDE 75 MILLILITER(S): 9 INJECTION, SOLUTION INTRAVENOUS at 08:59

## 2023-08-15 RX ADMIN — HYDROMORPHONE HYDROCHLORIDE 0.5 MILLIGRAM(S): 2 INJECTION INTRAMUSCULAR; INTRAVENOUS; SUBCUTANEOUS at 12:38

## 2023-08-15 RX ADMIN — SODIUM CHLORIDE 75 MILLILITER(S): 9 INJECTION, SOLUTION INTRAVENOUS at 00:15

## 2023-08-15 RX ADMIN — Medication 100 MILLIGRAM(S): at 14:43

## 2023-08-15 RX ADMIN — POLYETHYLENE GLYCOL 3350 17 GRAM(S): 17 POWDER, FOR SOLUTION ORAL at 21:54

## 2023-08-15 RX ADMIN — HEPARIN SODIUM 5000 UNIT(S): 5000 INJECTION INTRAVENOUS; SUBCUTANEOUS at 14:43

## 2023-08-15 RX ADMIN — HYDROMORPHONE HYDROCHLORIDE 0.5 MILLIGRAM(S): 2 INJECTION INTRAMUSCULAR; INTRAVENOUS; SUBCUTANEOUS at 19:03

## 2023-08-15 RX ADMIN — HYDROMORPHONE HYDROCHLORIDE 0.5 MILLIGRAM(S): 2 INJECTION INTRAMUSCULAR; INTRAVENOUS; SUBCUTANEOUS at 06:00

## 2023-08-15 RX ADMIN — HYDROMORPHONE HYDROCHLORIDE 0.5 MILLIGRAM(S): 2 INJECTION INTRAMUSCULAR; INTRAVENOUS; SUBCUTANEOUS at 12:08

## 2023-08-15 RX ADMIN — CHLORHEXIDINE GLUCONATE 1 APPLICATION(S): 213 SOLUTION TOPICAL at 06:00

## 2023-08-15 RX ADMIN — SENNA PLUS 2 TABLET(S): 8.6 TABLET ORAL at 21:54

## 2023-08-15 RX ADMIN — HYDROMORPHONE HYDROCHLORIDE 0.5 MILLIGRAM(S): 2 INJECTION INTRAMUSCULAR; INTRAVENOUS; SUBCUTANEOUS at 18:33

## 2023-08-15 RX ADMIN — HEPARIN SODIUM 5000 UNIT(S): 5000 INJECTION INTRAVENOUS; SUBCUTANEOUS at 21:54

## 2023-08-15 RX ADMIN — HEPARIN SODIUM 5000 UNIT(S): 5000 INJECTION INTRAVENOUS; SUBCUTANEOUS at 06:00

## 2023-08-15 RX ADMIN — Medication 1 MILLIGRAM(S): at 08:58

## 2023-08-15 RX ADMIN — PANTOPRAZOLE SODIUM 40 MILLIGRAM(S): 20 TABLET, DELAYED RELEASE ORAL at 06:00

## 2023-08-15 RX ADMIN — Medication 100 MILLIGRAM(S): at 06:00

## 2023-08-15 NOTE — PHYSICAL THERAPY INITIAL EVALUATION ADULT - RANGE OF MOTION EXAMINATION, REHAB EVAL
L LE AROM limited by severe pain/bilateral upper extremity ROM was WFL (within functional limits)/Right LE ROM was WFL (within functional limits)

## 2023-08-15 NOTE — PHYSICAL THERAPY INITIAL EVALUATION ADULT - LEVEL OF INDEPENDENCE: SUPINE/SIT, REHAB EVAL
to LE; completed ~75% of transfer, but activity discontinued 2* to severe pain with hip flexion/minimum assist (75% patients effort)

## 2023-08-15 NOTE — PHYSICAL THERAPY INITIAL EVALUATION ADULT - PERTINENT HX OF CURRENT PROBLEM, REHAB EVAL
78F w/PMHx of RA on MTX, Hx of bladder CA s/p TURBT by Dr. 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  Patient reports was crouched and unloading her clothes washer when she tripped and fell over to her left side.  Patient experienced a large amount of pain and was unable to get up.  Patient was on floor approx 30m before she was found by a family member.  They were unable to get the patient up and activated EMS for transport to ED.  Patient with complaints of left hip pain, unable to straighten it or bare weight.  No CP/SOB.  No abdominal or urinary complaints.  Patient compliant with medicine and FU.

## 2023-08-15 NOTE — PROGRESS NOTE ADULT - ATTENDING COMMENTS
78F w/PMHx of RA on MTX, Hx of bladder CA s/p TURBT by Dr. 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  Patient reports was crouched and unloading her clothes washer when she tripped and fell over to her left side.     # s/p fall with left femoral neck fracture s/p Closed reduction and percutaneous pinning of left hip on 8/14  - orthopedics following  post op prophylactic antibiotics for 24 hours  pain control- oxycodone and dilaudid; pain management discussed with patient  bowel regime- start on senna and miralax  dvt prophylaxis- heparin; on d/c aspirin 81 mg BID  monitor cbc  active type and screen  PT/OT  Weight bearing: WBAT LLE with walker    # transaminitis- possibly drug induced- improving  - no abdominal pain on exam  normal on presentation  hold tylenol, monitor LFT    # thrombocytopenia- monitor    #Hx of Rheumatoid arthritis  -Continue with home methotrexate and folic acid    #CKD stage IIIa  -Cr 1.1 on admission    #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     Time-based billing (NON-critical care).     35 minutes spent on total encounter. The necessity of the time spent during the encounter on this date of service was due to:     Total time spent to complete patient's bedside assessment, review medical chart, discuss medical plan of care with  medical team was more than 35 minutes  with >50% of time spent face to face with patient, discussion with patient and/or coordination of care.
78F w/PMHx of RA on MTX, Hx of bladder CA s/p TURBT by Dr. 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  Patient reports was crouched and unloading her clothes washer when she tripped and fell over to her left side.     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

## 2023-08-15 NOTE — PHYSICAL THERAPY INITIAL EVALUATION ADULT - ADDITIONAL COMMENTS
Pt. lives alone in the upstairs apartment of a house with family nearby. 9 steps to enter, no stairs inside. Denies use of AD or DME PTA. Independent at baseline.

## 2023-08-15 NOTE — PHYSICAL THERAPY INITIAL EVALUATION ADULT - GENERAL OBSERVATIONS, REHAB EVAL
8:35-9:05 Pt. encountered in semifowler in bed in NAD. +Dressing L hip, +B LE sequentials, +Primafit, +IV L UE. Gumaro GABRIEL aware.

## 2023-08-16 ENCOUNTER — TRANSCRIPTION ENCOUNTER (OUTPATIENT)
Age: 78
End: 2023-08-16

## 2023-08-16 LAB
ALBUMIN SERPL ELPH-MCNC: 3.5 G/DL — SIGNIFICANT CHANGE UP (ref 3.5–5.2)
ALP SERPL-CCNC: 92 U/L — SIGNIFICANT CHANGE UP (ref 30–115)
ALT FLD-CCNC: 62 U/L — HIGH (ref 0–41)
ANION GAP SERPL CALC-SCNC: 9 MMOL/L — SIGNIFICANT CHANGE UP (ref 7–14)
AST SERPL-CCNC: 44 U/L — HIGH (ref 0–41)
BASOPHILS # BLD AUTO: 0.02 K/UL — SIGNIFICANT CHANGE UP (ref 0–0.2)
BASOPHILS NFR BLD AUTO: 0.4 % — SIGNIFICANT CHANGE UP (ref 0–1)
BILIRUB SERPL-MCNC: 0.3 MG/DL — SIGNIFICANT CHANGE UP (ref 0.2–1.2)
BUN SERPL-MCNC: 11 MG/DL — SIGNIFICANT CHANGE UP (ref 10–20)
CALCIUM SERPL-MCNC: 8.7 MG/DL — SIGNIFICANT CHANGE UP (ref 8.4–10.5)
CHLORIDE SERPL-SCNC: 106 MMOL/L — SIGNIFICANT CHANGE UP (ref 98–110)
CO2 SERPL-SCNC: 26 MMOL/L — SIGNIFICANT CHANGE UP (ref 17–32)
CREAT SERPL-MCNC: 0.8 MG/DL — SIGNIFICANT CHANGE UP (ref 0.7–1.5)
EGFR: 75 ML/MIN/1.73M2 — SIGNIFICANT CHANGE UP
EOSINOPHIL # BLD AUTO: 0.1 K/UL — SIGNIFICANT CHANGE UP (ref 0–0.7)
EOSINOPHIL NFR BLD AUTO: 1.8 % — SIGNIFICANT CHANGE UP (ref 0–8)
GLUCOSE SERPL-MCNC: 106 MG/DL — HIGH (ref 70–99)
HCT VFR BLD CALC: 33.1 % — LOW (ref 37–47)
HGB BLD-MCNC: 11.2 G/DL — LOW (ref 12–16)
IMM GRANULOCYTES NFR BLD AUTO: 0.7 % — HIGH (ref 0.1–0.3)
LYMPHOCYTES # BLD AUTO: 0.78 K/UL — LOW (ref 1.2–3.4)
LYMPHOCYTES # BLD AUTO: 14.3 % — LOW (ref 20.5–51.1)
MAGNESIUM SERPL-MCNC: 2 MG/DL — SIGNIFICANT CHANGE UP (ref 1.8–2.4)
MCHC RBC-ENTMCNC: 33 PG — HIGH (ref 27–31)
MCHC RBC-ENTMCNC: 33.8 G/DL — SIGNIFICANT CHANGE UP (ref 32–37)
MCV RBC AUTO: 97.6 FL — SIGNIFICANT CHANGE UP (ref 81–99)
MONOCYTES # BLD AUTO: 0.46 K/UL — SIGNIFICANT CHANGE UP (ref 0.1–0.6)
MONOCYTES NFR BLD AUTO: 8.4 % — SIGNIFICANT CHANGE UP (ref 1.7–9.3)
NEUTROPHILS # BLD AUTO: 4.07 K/UL — SIGNIFICANT CHANGE UP (ref 1.4–6.5)
NEUTROPHILS NFR BLD AUTO: 74.4 % — SIGNIFICANT CHANGE UP (ref 42.2–75.2)
NRBC # BLD: 0 /100 WBCS — SIGNIFICANT CHANGE UP (ref 0–0)
PLATELET # BLD AUTO: 103 K/UL — LOW (ref 130–400)
PMV BLD: 12.5 FL — HIGH (ref 7.4–10.4)
POTASSIUM SERPL-MCNC: 4.4 MMOL/L — SIGNIFICANT CHANGE UP (ref 3.5–5)
POTASSIUM SERPL-SCNC: 4.4 MMOL/L — SIGNIFICANT CHANGE UP (ref 3.5–5)
PROT SERPL-MCNC: 5.3 G/DL — LOW (ref 6–8)
RBC # BLD: 3.39 M/UL — LOW (ref 4.2–5.4)
RBC # FLD: 13.2 % — SIGNIFICANT CHANGE UP (ref 11.5–14.5)
SODIUM SERPL-SCNC: 141 MMOL/L — SIGNIFICANT CHANGE UP (ref 135–146)
WBC # BLD: 5.47 K/UL — SIGNIFICANT CHANGE UP (ref 4.8–10.8)
WBC # FLD AUTO: 5.47 K/UL — SIGNIFICANT CHANGE UP (ref 4.8–10.8)

## 2023-08-16 PROCEDURE — 99232 SBSQ HOSP IP/OBS MODERATE 35: CPT

## 2023-08-16 RX ORDER — OXYCODONE HYDROCHLORIDE 5 MG/1
5 TABLET ORAL ONCE
Refills: 0 | Status: DISCONTINUED | OUTPATIENT
Start: 2023-08-16 | End: 2023-08-16

## 2023-08-16 RX ORDER — POLYETHYLENE GLYCOL 3350 17 G/17G
17 POWDER, FOR SOLUTION ORAL
Qty: 1 | Refills: 0
Start: 2023-08-16 | End: 2023-09-14

## 2023-08-16 RX ORDER — ASPIRIN/CALCIUM CARB/MAGNESIUM 324 MG
1 TABLET ORAL
Qty: 84 | Refills: 0
Start: 2023-08-16 | End: 2023-09-26

## 2023-08-16 RX ORDER — PANTOPRAZOLE SODIUM 20 MG/1
1 TABLET, DELAYED RELEASE ORAL
Qty: 42 | Refills: 0
Start: 2023-08-16 | End: 2023-09-26

## 2023-08-16 RX ADMIN — HEPARIN SODIUM 5000 UNIT(S): 5000 INJECTION INTRAVENOUS; SUBCUTANEOUS at 21:17

## 2023-08-16 RX ADMIN — HEPARIN SODIUM 5000 UNIT(S): 5000 INJECTION INTRAVENOUS; SUBCUTANEOUS at 13:22

## 2023-08-16 RX ADMIN — PANTOPRAZOLE SODIUM 40 MILLIGRAM(S): 20 TABLET, DELAYED RELEASE ORAL at 05:40

## 2023-08-16 RX ADMIN — HYDROMORPHONE HYDROCHLORIDE 0.5 MILLIGRAM(S): 2 INJECTION INTRAMUSCULAR; INTRAVENOUS; SUBCUTANEOUS at 01:50

## 2023-08-16 RX ADMIN — SENNA PLUS 2 TABLET(S): 8.6 TABLET ORAL at 21:16

## 2023-08-16 RX ADMIN — CHLORHEXIDINE GLUCONATE 1 APPLICATION(S): 213 SOLUTION TOPICAL at 05:39

## 2023-08-16 RX ADMIN — OXYCODONE HYDROCHLORIDE 5 MILLIGRAM(S): 5 TABLET ORAL at 20:17

## 2023-08-16 RX ADMIN — POLYETHYLENE GLYCOL 3350 17 GRAM(S): 17 POWDER, FOR SOLUTION ORAL at 11:14

## 2023-08-16 RX ADMIN — HYDROMORPHONE HYDROCHLORIDE 0.5 MILLIGRAM(S): 2 INJECTION INTRAMUSCULAR; INTRAVENOUS; SUBCUTANEOUS at 12:59

## 2023-08-16 RX ADMIN — HYDROMORPHONE HYDROCHLORIDE 0.5 MILLIGRAM(S): 2 INJECTION INTRAMUSCULAR; INTRAVENOUS; SUBCUTANEOUS at 22:56

## 2023-08-16 RX ADMIN — HYDROMORPHONE HYDROCHLORIDE 0.5 MILLIGRAM(S): 2 INJECTION INTRAMUSCULAR; INTRAVENOUS; SUBCUTANEOUS at 13:29

## 2023-08-16 RX ADMIN — HYDROMORPHONE HYDROCHLORIDE 0.5 MILLIGRAM(S): 2 INJECTION INTRAMUSCULAR; INTRAVENOUS; SUBCUTANEOUS at 23:30

## 2023-08-16 RX ADMIN — OXYCODONE HYDROCHLORIDE 5 MILLIGRAM(S): 5 TABLET ORAL at 20:47

## 2023-08-16 RX ADMIN — SODIUM CHLORIDE 75 MILLILITER(S): 9 INJECTION, SOLUTION INTRAVENOUS at 11:11

## 2023-08-16 RX ADMIN — HYDROMORPHONE HYDROCHLORIDE 0.5 MILLIGRAM(S): 2 INJECTION INTRAMUSCULAR; INTRAVENOUS; SUBCUTANEOUS at 06:55

## 2023-08-16 RX ADMIN — HEPARIN SODIUM 5000 UNIT(S): 5000 INJECTION INTRAVENOUS; SUBCUTANEOUS at 05:41

## 2023-08-16 RX ADMIN — HYDROMORPHONE HYDROCHLORIDE 0.5 MILLIGRAM(S): 2 INJECTION INTRAMUSCULAR; INTRAVENOUS; SUBCUTANEOUS at 00:46

## 2023-08-16 RX ADMIN — Medication 1 MILLIGRAM(S): at 10:43

## 2023-08-16 NOTE — DISCHARGE NOTE PROVIDER - NSDCMRMEDTOKEN_GEN_ALL_CORE_FT
aspirin 81 mg oral capsule: 1 cap(s) orally 2 times a day  ClearLax oral powder for reconstitution: 17 gram(s) orally once a day  folic acid 1 mg oral tablet: 1 tab(s) orally once a day  methotrexate 2.5 mg oral tablet: 6 tab(s) orally once a week  pantoprazole 40 mg oral delayed release tablet: 1 tab(s) orally once a day (before a meal)   aspirin 81 mg oral capsule: 1 cap(s) orally 2 times a day  ClearLax oral powder for reconstitution: 17 gram(s) orally once a day  folic acid 1 mg oral tablet: 1 tab(s) orally once a day  methotrexate 2.5 mg oral tablet: 6 tab(s) orally once a week  oxycodone-acetaminophen 5 mg-325 mg oral tablet: 1 tab(s) orally every 4 hours As needed Moderate Pain (4 - 6)  pantoprazole 40 mg oral delayed release tablet: 1 tab(s) orally once a day (before a meal)

## 2023-08-16 NOTE — DISCHARGE NOTE PROVIDER - HOSPITAL COURSE
78F w/PMHx of RA on MTX, Hx of bladder CA s/p TURBT by Dr. 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  brought in by family member post mechanical fall.  Patient with complaints of left hip pain, unable to straighten it or bare weight.  No CP/SOB.  No abdominal or urinary complaints.    Xray pelvis:  Acute, nondisplaced left femoral neck fracture.  Degenerative change of left hip and lower lumbar spine.  Post right hip arthroplasty. Osteopenia. Left knee intact.    CT hip:  1.  Acute minimally impacted subcapital left femoral neck fracture.  2.  Interval increase in size of a 4 cm left adnexal cyst compared to   11/24/2018. Follow-up pelvic ultrasound is recommended.    procedure: Closed reduction and percutaneous pinning of left hip 14th of August 2023  post op xray: IMPRESSION:    There has been interval percutaneous fixation of a left femoral neck   fracture with 3 cannulated screws. The hardware is unremarkable in   appearance. Skin staples are noted.    Please follow up with Dr. Larios 2 weeks from discharge [facility].    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 in 2023  A left adnexal cyst measuring up to 4 cm has increased in   size compared to 11/24/2018. There are vascular calcification.    #Transaminitis  -possible med side effect  - considered stopping tylenol  - trending down, asymptomatic    #Thrombocytopenia  Platelet Count - Automated: 105 K/uL (08.15.23 @ 07:20)   Platelet Count - Automated: 116 K/uL (08.14.23 @ 15:25)   Platelet Count - Automated: 120 K/uL (08.14.23 @ 11:12)   Platelet Count - Automated: 149 K/uL (08.13.23 @ 14:01)   - for trending for now  - asymptomatic  - before heparin HIT unlikely           78F w/PMHx of RA on MTX, Hx of bladder CA s/p TURBT by Dr. 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  brought in by family member post mechanical fall.  Patient with complaints of left hip pain, unable to straighten it or bare weight.  No CP/SOB.  No abdominal or urinary complaints.    Xray pelvis:  Acute, nondisplaced left femoral neck fracture.  Degenerative change of left hip and lower lumbar spine.  Post right hip arthroplasty. Osteopenia. Left knee intact.    CT hip:  1.  Acute minimally impacted subcapital left femoral neck fracture.  2.  Interval increase in size of a 4 cm left adnexal cyst compared to   11/24/2018. Follow-up pelvic ultrasound is recommended.    procedure: Closed reduction and percutaneous pinning of left hip 14th of August 2023  post op xray: IMPRESSION:    There has been interval percutaneous fixation of a left femoral neck   fracture with 3 cannulated screws. The hardware is unremarkable in   appearance. Skin staples are noted.  Transferred to in hospital rehab at     Please follow up with Dr. Larios 2 weeks from discharge [address] [facility].    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 in 2023  A left adnexal cyst measuring up to 4 cm has increased in   size compared to 11/24/2018. There are vascular calcification.    #Transaminitis  -possible med side effect  - considered stopping tylenol  - trending down, asymptomatic    #Thrombocytopenia  Platelet Count - Automated: 105 K/uL (08.15.23 @ 07:20)   Platelet Count - Automated: 116 K/uL (08.14.23 @ 15:25)   Platelet Count - Automated: 120 K/uL (08.14.23 @ 11:12)   Platelet Count - Automated: 149 K/uL (08.13.23 @ 14:01)   - for trending for now  - asymptomatic  - before heparin HIT unlikely

## 2023-08-16 NOTE — DISCHARGE NOTE PROVIDER - CARE PROVIDERS DIRECT ADDRESSES
[email or web address]

## 2023-08-16 NOTE — DISCHARGE NOTE PROVIDER - CARE PROVIDER_API CALL
Vu Larios  Orthopaedic Surgery  [address] [place] [address]  Phone: [phone]  Fax: [phone]  Follow Up Time:     Wilfredo Carolina  Internal Medicine  [address] [place] [id number]  Phone: [phone]  Fax: [phone]  Follow Up Time:

## 2023-08-16 NOTE — CDI QUERY NOTE - NSCDIOTHERTXTBX_GEN_ALL_CORE_HH
Based on your professional judgment and the clinical indicators below, please clarify if the therapeutic action of methotrexate can be further specified as:  • The patient is in an immunosuppressed state as expected to be associated with methotrexate therapy prescribed prior to admission for rheumatoid arthritis  • The patient is not in an immunosuppressed state associated with methotrexate therapy prescribed prior to admission for rheumatoid arthritis  • Other (please specify):  • Clinically unable to determine      CLINICAL INDICATORS    8/13 H&P: …78F w/PMHx of RA on MTX,  Hx of bladder CA s/p TURBT by Dr. Best, 5.5cm right posterior communicating artery aneurysm presents to ED s/p fall sustained just PTA found to have left femoral neck fracture… #Rheumatoid arthritis. - c/w MTX 15mg PO qWeek on Thursday…    8/15 Internal Medicine Progress Note: …# thrombocytopenia- monitor…    Orders:  • Methotrexate 15 mg PO every 1 week: Thu indication: RA (8/13)

## 2023-08-16 NOTE — CDI QUERY NOTE - NSCDINOTEDOCCLARIFICATION_GEN_A_CORE
Report from KAROLINA Perez   PLEASE INCLUDE MORE SPECIFIC DOCUMENTATION IN YOUR PROGRESS NOTE AND DISCHARGE SUMMARY.  The documentation in this patient's medical record requires additional clarification to ensure that we accurately capture the patients diagnosis(es), treatment and/or severity of illness. Please document to the greatest level of specificity all corresponding diagnoses (either known or suspected) and/or treatment associated with the clinical information described below.

## 2023-08-16 NOTE — DISCHARGE NOTE PROVIDER - NSDCCPCAREPLAN_GEN_ALL_CORE_FT
PRINCIPAL DISCHARGE DIAGNOSIS  Diagnosis: Fracture of femoral neck, left  Assessment and Plan of Treatment: Fall prevention includes ways to make your home and other areas safer. It also includes ways you can move more carefully to prevent a fall. Health conditions that cause changes in your blood pressure, vision, or muscle strength and coordination may increase your risk for falls. Medicines may also increase your risk for falls if they make you dizzy, weak, or sleepy.  It is likely that your fall was caused by some of the medications that you were on. We have adjusted [facility] medication to try and optimize your condition without overmedicated you in [facility] hope to prevent falls.   Seek Medical Attention If:  You have fallen and are unconscious.  fallen and cannot move part of your body.  You have fallen and have pain or a headache.  Fall prevention tips:  Stand or sit up slowly.  Use assistive devices as directed.   You may need to have grab bars put in your bathroom near the toilet or in the shower.  Wear shoes that fit well and have soles that . Wear shoes both inside and outside. Do not wear shoes with high heels.  Wear a personal alarm that can call 911 in an emergency.   Manage your medical conditions. Keep all appointments with your healthcare providers. Visit your eye doctor as directed.  Home safety tips:   Put nonslip strips on your bath or shower floor.  Use a shower seat. Sit on the toilet or a chair in your bathroom.  Keep paths clear. Remove books, shoes, and other objects from walkways and stairs. Place cords for telephones and lamps out of the way. Remove small rugs or secure it with double-sided tape.  Install bright lights in your home. Use night lights to help light paths to the bathroom or kitchen. Always turn on the light before you start walking.  Keep items you use often on shelves within reach. Do not use a step stool to help you reach an item.  Place reflective tape on the edges of your stairs.      SECONDARY DISCHARGE DIAGNOSES  Diagnosis: Ovarian cyst  Assessment and Plan of Treatment: You have an ovarian cyst that in increasing in size very slowly requiring follow up    Diagnosis: Posterior communicating artery aneurysm  Assessment and Plan of Treatment: You have an aneuyrsm in the blood vessel at the base of your brain. follow up is required with close attention to blood pressure    Diagnosis: Thrombocytopenia  Assessment and Plan of Treatment: You have a low normal platelets count. caution should be taken. avoid any trauma, sharp objects and falls

## 2023-08-17 LAB
ALBUMIN SERPL ELPH-MCNC: 3.2 G/DL — LOW (ref 3.5–5.2)
ALP SERPL-CCNC: 84 U/L — SIGNIFICANT CHANGE UP (ref 30–115)
ALT FLD-CCNC: 41 U/L — SIGNIFICANT CHANGE UP (ref 0–41)
ANION GAP SERPL CALC-SCNC: 7 MMOL/L — SIGNIFICANT CHANGE UP (ref 7–14)
AST SERPL-CCNC: 31 U/L — SIGNIFICANT CHANGE UP (ref 0–41)
BILIRUB SERPL-MCNC: 0.3 MG/DL — SIGNIFICANT CHANGE UP (ref 0.2–1.2)
BUN SERPL-MCNC: 13 MG/DL — SIGNIFICANT CHANGE UP (ref 10–20)
CALCIUM SERPL-MCNC: 8.7 MG/DL — SIGNIFICANT CHANGE UP (ref 8.4–10.4)
CHLORIDE SERPL-SCNC: 105 MMOL/L — SIGNIFICANT CHANGE UP (ref 98–110)
CO2 SERPL-SCNC: 28 MMOL/L — SIGNIFICANT CHANGE UP (ref 17–32)
CREAT SERPL-MCNC: 0.9 MG/DL — SIGNIFICANT CHANGE UP (ref 0.7–1.5)
EGFR: 65 ML/MIN/1.73M2 — SIGNIFICANT CHANGE UP
GLUCOSE SERPL-MCNC: 105 MG/DL — HIGH (ref 70–99)
HCT VFR BLD CALC: 32.1 % — LOW (ref 37–47)
HGB BLD-MCNC: 10.8 G/DL — LOW (ref 12–16)
MCHC RBC-ENTMCNC: 32.9 PG — HIGH (ref 27–31)
MCHC RBC-ENTMCNC: 33.6 G/DL — SIGNIFICANT CHANGE UP (ref 32–37)
MCV RBC AUTO: 97.9 FL — SIGNIFICANT CHANGE UP (ref 81–99)
NRBC # BLD: 0 /100 WBCS — SIGNIFICANT CHANGE UP (ref 0–0)
PLATELET # BLD AUTO: 112 K/UL — LOW (ref 130–400)
PMV BLD: 12.7 FL — HIGH (ref 7.4–10.4)
POTASSIUM SERPL-MCNC: 4 MMOL/L — SIGNIFICANT CHANGE UP (ref 3.5–5)
POTASSIUM SERPL-SCNC: 4 MMOL/L — SIGNIFICANT CHANGE UP (ref 3.5–5)
PROT SERPL-MCNC: 5.4 G/DL — LOW (ref 6–8)
RBC # BLD: 3.28 M/UL — LOW (ref 4.2–5.4)
RBC # FLD: 13.2 % — SIGNIFICANT CHANGE UP (ref 11.5–14.5)
SODIUM SERPL-SCNC: 140 MMOL/L — SIGNIFICANT CHANGE UP (ref 135–146)
WBC # BLD: 4.86 K/UL — SIGNIFICANT CHANGE UP (ref 4.8–10.8)
WBC # FLD AUTO: 4.86 K/UL — SIGNIFICANT CHANGE UP (ref 4.8–10.8)

## 2023-08-17 PROCEDURE — 99232 SBSQ HOSP IP/OBS MODERATE 35: CPT

## 2023-08-17 RX ORDER — METHOTREXATE 2.5 MG/1
15 TABLET ORAL
Refills: 0 | Status: DISCONTINUED | OUTPATIENT
Start: 2023-08-17 | End: 2023-08-18

## 2023-08-17 RX ORDER — KETOROLAC TROMETHAMINE 30 MG/ML
15 SYRINGE (ML) INJECTION ONCE
Refills: 0 | Status: DISCONTINUED | OUTPATIENT
Start: 2023-08-17 | End: 2023-08-17

## 2023-08-17 RX ORDER — OXYCODONE HYDROCHLORIDE 5 MG/1
5 TABLET ORAL EVERY 8 HOURS
Refills: 0 | Status: DISCONTINUED | OUTPATIENT
Start: 2023-08-17 | End: 2023-08-17

## 2023-08-17 RX ORDER — OXYCODONE HYDROCHLORIDE 5 MG/1
5 TABLET ORAL EVERY 6 HOURS
Refills: 0 | Status: DISCONTINUED | OUTPATIENT
Start: 2023-08-17 | End: 2023-08-18

## 2023-08-17 RX ADMIN — OXYCODONE HYDROCHLORIDE 5 MILLIGRAM(S): 5 TABLET ORAL at 17:04

## 2023-08-17 RX ADMIN — CHLORHEXIDINE GLUCONATE 1 APPLICATION(S): 213 SOLUTION TOPICAL at 05:06

## 2023-08-17 RX ADMIN — METHOTREXATE 15 MILLIGRAM(S): 2.5 TABLET ORAL at 11:58

## 2023-08-17 RX ADMIN — SODIUM CHLORIDE 75 MILLILITER(S): 9 INJECTION, SOLUTION INTRAVENOUS at 00:20

## 2023-08-17 RX ADMIN — HYDROMORPHONE HYDROCHLORIDE 0.5 MILLIGRAM(S): 2 INJECTION INTRAMUSCULAR; INTRAVENOUS; SUBCUTANEOUS at 08:39

## 2023-08-17 RX ADMIN — SODIUM CHLORIDE 75 MILLILITER(S): 9 INJECTION, SOLUTION INTRAVENOUS at 08:09

## 2023-08-17 RX ADMIN — HEPARIN SODIUM 5000 UNIT(S): 5000 INJECTION INTRAVENOUS; SUBCUTANEOUS at 13:30

## 2023-08-17 RX ADMIN — OXYCODONE HYDROCHLORIDE 5 MILLIGRAM(S): 5 TABLET ORAL at 17:34

## 2023-08-17 RX ADMIN — SENNA PLUS 2 TABLET(S): 8.6 TABLET ORAL at 21:19

## 2023-08-17 RX ADMIN — HEPARIN SODIUM 5000 UNIT(S): 5000 INJECTION INTRAVENOUS; SUBCUTANEOUS at 21:19

## 2023-08-17 RX ADMIN — HYDROMORPHONE HYDROCHLORIDE 0.5 MILLIGRAM(S): 2 INJECTION INTRAMUSCULAR; INTRAVENOUS; SUBCUTANEOUS at 08:09

## 2023-08-17 RX ADMIN — POLYETHYLENE GLYCOL 3350 17 GRAM(S): 17 POWDER, FOR SOLUTION ORAL at 11:57

## 2023-08-17 RX ADMIN — HEPARIN SODIUM 5000 UNIT(S): 5000 INJECTION INTRAVENOUS; SUBCUTANEOUS at 05:02

## 2023-08-17 RX ADMIN — OXYCODONE HYDROCHLORIDE 5 MILLIGRAM(S): 5 TABLET ORAL at 23:11

## 2023-08-17 RX ADMIN — PANTOPRAZOLE SODIUM 40 MILLIGRAM(S): 20 TABLET, DELAYED RELEASE ORAL at 05:03

## 2023-08-17 RX ADMIN — SODIUM CHLORIDE 75 MILLILITER(S): 9 INJECTION, SOLUTION INTRAVENOUS at 05:03

## 2023-08-17 NOTE — PROVIDER CONTACT NOTE (OTHER) - SITUATION
patient IV infiltrated, patient is refusing to have a new IV place asking if her pain medication can be changed to PO instead, she is also tolerating her diet IVF can be d/c as well

## 2023-08-17 NOTE — PROGRESS NOTE ADULT - ASSESSMENT
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     
78F w/PMHx of RA on MTX, Hx of bladder CA s/p TURBT by Dr. 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  Patient reports was crouched and unloading her clothes washer when she tripped and fell over to her left side.     #s/p fall with left femoral neck fracture s/p Closed reduction and percutaneous pinning of left hip on 8/14  -orthopedics following  post op prophylactic antibiotics for 24 hours - completed  pain control- oxycodone and dilaudid; pain management discussed with patient  bowel regime- senna and miralax  dvt prophylaxis- heparin; on d/c aspirin 81 mg BID  monitor cbc  active type and screen  PT/OT  Weight bearing: WBAT LLE with walker    # transaminitis- possibly drug induced- improving  - no abdominal pain on exam  normal on presentation  hold tylenol, monitor LFT    # thrombocytopenia- monitor    #Hx of Rheumatoid arthritis  -Continue with home methotrexate and folic acid    #CKD stage IIIa  -Cr 1.1 on admission    #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     
#s/p fall with left femoral neck fracture sp LEFT HIP CLOSED REDUCTION, PERCUTANEOUS PINNING  -Weight bearing: WBAT LLE with walker  -DVT ppx  -pain control  -eval for rehab pending  -Please follow up with Dr. Larios 2 weeks from discharge [address]    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 in 2023  A left adnexal cyst measuring up to 4 cm has increased in   size compared to 11/24/2018. There are vascular calcification.    #Transaminitis  -possible med side effect  - considered stopping tylenol  - trend for now    #Thrombocytopenia  Platelet Count - Automated: 105 K/uL (08.15.23 @ 07:20)   Platelet Count - Automated: 116 K/uL (08.14.23 @ 15:25)   Platelet Count - Automated: 120 K/uL (08.14.23 @ 11:12)   Platelet Count - Automated: 149 K/uL (08.13.23 @ 14:01)   - for trending for now  - asymptomatic  - before heparin HIT unlikely      - DVT PPx: heparin  - GI PPx: panto  - Diet: NPO mid night  - Activity: non wt bearing  - Labs: cbc, bmp  - Code: full  - Dispo:     
78F w/PMHx of RA on MTX, Hx of bladder CA s/p TURBT by Dr. 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  Patient reports was crouched and unloading her clothes washer when she tripped and fell over to her left side.     #s/p fall with left femoral neck fracture s/p Closed reduction and percutaneous pinning of left hip on 8/14  -orthopedics following  post op prophylactic antibiotics for 24 hours - completed  pain control- oxycodone and dilaudid; pain management discussed with patient  bowel regime- senna and miralax  dvt prophylaxis- heparin; on d/c aspirin 81 mg BID  monitor cbc  active type and screen  PT/OT  Weight bearing: WBAT LLE with walker    # transaminitis- possibly drug induced- improving  - no abdominal pain on exam  normal on presentation  hold tylenol, monitor LFT    # thrombocytopenia- resolved   continue to monitor     #Hx of Rheumatoid arthritis  -Continue with home methotrexate and folic acid  The patient is in an immunosuppressed state as expected to be associated with methotrexate therapy prescribed prior to admission for rheumatoid arthritis    #CKD stage IIIa  -Cr 1.1 on admission    #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

## 2023-08-17 NOTE — PROGRESS NOTE ADULT - PROVIDER SPECIALTY LIST ADULT
Internal Medicine
Orthopedics
Orthopedics
Internal Medicine
Orthopedics
Orthopedics
Internal Medicine
Internal Medicine

## 2023-08-17 NOTE — PROGRESS NOTE ADULT - REASON FOR ADMISSION
Fall with left femoral neck fracture
fall x 1 day
fall x 1 day
fall with fracuted hip
fall x 1 day

## 2023-08-17 NOTE — CONSULT NOTE ADULT - SUBJECTIVE AND OBJECTIVE BOX
ORTHOPAEDIC SURGERY CONSULT NOTE    Reason for Consult: Left femoral neck fracture    HPI: 78y Female PMH RA on MTX, bladder CA s/p TURBT, brain aneurysm presents with pain in left hip. Patient experiences a mechanical ground level fall when she reached down while doing laundry and lost her balance falling on left side. Patient denies head trauma or LOC. Denies pain elsewhere. Denies paresthesias. Not on blood thinners.     PAST MEDICAL & SURGICAL HISTORY:  RA (rheumatoid arthritis)      Chronic GERD      Bladder cancer      Benign ovarian cyst  RIGHT OVARY REMOVED      History of cholecystectomy      History of appendectomy      History of hip replacement, total, right  1998      H/O bilateral cataract extraction      H/O transurethral resection of bladder tumor (TURBT)        Allergies: morphine (Unknown)    Medications: acetaminophen     Tablet .. 650 milliGRAM(s) Oral every 6 hours PRN  aluminum hydroxide/magnesium hydroxide/simethicone Suspension 30 milliLiter(s) Oral every 4 hours PRN  chlorhexidine 2% Cloths 1 Application(s) Topical <User Schedule>  folic acid 1 milliGRAM(s) Oral <User Schedule>  heparin   Injectable 5000 Unit(s) SubCutaneous every 8 hours  HYDROmorphone  Injectable 0.5 milliGRAM(s) IV Push every 6 hours PRN  melatonin 3 milliGRAM(s) Oral at bedtime PRN  methotrexate 15 milliGRAM(s) Oral <User Schedule>  ondansetron Injectable 4 milliGRAM(s) IV Push every 8 hours PRN      PHYSICAL EXAM:  Vital Signs Last 24 Hrs  T(C): 37.2 (13 Aug 2023 12:51), Max: 37.2 (13 Aug 2023 12:51)  T(F): 98.9 (13 Aug 2023 12:51), Max: 98.9 (13 Aug 2023 12:51)  HR: 76 (13 Aug 2023 12:51) (76 - 76)  BP: 143/92 (13 Aug 2023 12:51) (143/92 - 143/92)  BP(mean): --  RR: 18 (13 Aug 2023 12:51) (18 - 18)  SpO2: 97% (13 Aug 2023 12:51) (97% - 97%)    Parameters below as of 13 Aug 2023 12:51  Patient On (Oxygen Delivery Method): room air        Physical Exam:  General: NAD. AAOx3.  Resp: NLB on RA.    LLE:   Skin intact  leg shortened and externally rotated  +TTP groin  NTTP thigh, knee, tib/fib, foot/ankle  ROM of the foot/ankle full and painless  Pain with motion of the hip  NVID distally in all distributions with 2+ pulses    Labs:                        13.6   10.13 )-----------( 149      ( 13 Aug 2023 14:01 )             40.8     08-13    137  |  104  |  21<H>  ----------------------------<  96  5.1<H>   |  23  |  1.1    Ca    9.6      13 Aug 2023 14:01    TPro  6.7  /  Alb  4.4  /  TBili  0.5  /  DBili  x   /  AST  49<H>  /  ALT  30  /  AlkPhos  83  08-13    PT/INR - ( 13 Aug 2023 14:01 )   PT: 11.40 sec;   INR: 1.00 ratio         PTT - ( 13 Aug 2023 14:01 )  PTT:29.1 sec    Imaging:  XR: Pelvis, L hip, femur, knee: subcapital L femoral neck fracture, no other fracture or dislocation. Noted prior R hip arthroplasty without evidence of hardware failure.    A/P: 78y Female with L femoral neck fracture.    - Admit medicine  - Add on for L hip hemiarthroplasty  - Preop labs, EKG, CXR, T&S, 2u pRBC on hold for OR  - Medicine risk stratification  - Pain control  - NWB LLE  - Hold dvt ppx morning of surgery, restart after surgery  - NPO with IVF maintenance at midnight
HPI:  78F w/PMHx of RA on MTX, Hx of bladder CA s/p TURBT by Dr. 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  Patient reports was crouched and unloading her clothes washer when she tripped and fell over to her left side.  Patient experienced a large amount of pain and was unable to get up.  Patient was on floor approx 30m before she was found by a family member.  They were unable to get the patient up and activated EMS for transport to ED.  Patient with complaints of left hip pain, unable to straighten it or bare weight.  No CP/SOB.  No abdominal or urinary complaints.  Patient compliant with medicine and FU.     (13 Aug 2023 15:40)      PAST MEDICAL & SURGICAL HISTORY:  RA (rheumatoid arthritis)      Chronic GERD      Bladder cancer      Benign ovarian cyst  RIGHT OVARY REMOVED      History of cholecystectomy      History of appendectomy      History of hip replacement, total, right  1998      H/O bilateral cataract extraction      H/O transurethral resection of bladder tumor (TURBT)          Hospital Course:    TODAY'S SUBJECTIVE & REVIEW OF SYMPTOMS:     Constitutional WNL   Cardio WNL   Resp WNL   GI WNL  Heme WNL  Endo WNL  Skin WNL  MSK RA  Neuro brain anneurysm  Cognitive WNL  Psych WNL      MEDICATIONS  (STANDING):  chlorhexidine 2% Cloths 1 Application(s) Topical <User Schedule>  folic acid 1 milliGRAM(s) Oral <User Schedule>  heparin   Injectable 5000 Unit(s) SubCutaneous every 8 hours  methotrexate 15 milliGRAM(s) Oral every week  pantoprazole    Tablet 40 milliGRAM(s) Oral before breakfast  polyethylene glycol 3350 17 Gram(s) Oral daily  senna 2 Tablet(s) Oral at bedtime    MEDICATIONS  (PRN):  aluminum hydroxide/magnesium hydroxide/simethicone Suspension 30 milliLiter(s) Oral every 4 hours PRN Dyspepsia  melatonin 3 milliGRAM(s) Oral at bedtime PRN Insomnia  oxyCODONE    IR 5 milliGRAM(s) Oral every 8 hours PRN Moderate Pain (4 - 6)      FAMILY HISTORY:  FHx: pancreatic cancer (Father)        Allergies    morphine (Unknown)    Intolerances        SOCIAL HISTORY:    [  ] Etoh  [  ] Smoking  [  ] Substance abuse     Home Environment:  [ x ] Home Alone  [  ] Lives with Family  [  ] Home Health Aid    Dwelling:  [x] Apartment  [  ] Private House  [  ] Adult Home  [  ] Skilled Nursing Facility      [  ] Short Term  [  ] Long Term  [xx  ] Stairs-few steps to enter       Elevator [  ]    FUNCTIONAL STATUS PTA: (Check all that apply)  Ambulation: [ x  ]Independent    [  ] Dependent     [  ] Non-Ambulatory  Assistive Device: [  ] SA Cane  [  ]  Q Cane  [  ] Walker  [  ]  Wheelchair  ADL : [x  ] Independent  [  ]  Dependent       Vital Signs Last 24 Hrs  T(C): 37.4 (17 Aug 2023 16:48), Max: 37.4 (17 Aug 2023 16:48)  T(F): 99.4 (17 Aug 2023 16:48), Max: 99.4 (17 Aug 2023 16:48)  HR: 68 (17 Aug 2023 16:48) (68 - 78)  BP: 155/69 (17 Aug 2023 16:48) (107/68 - 159/69)  BP(mean): --  RR: 18 (17 Aug 2023 16:48) (18 - 18)  SpO2: 97% (17 Aug 2023 16:48) (95% - 98%)    Parameters below as of 17 Aug 2023 05:20  Patient On (Oxygen Delivery Method): room air          PHYSICAL EXAM: Alert & Oriented X3  GENERAL: NAD, well-groomed, well-developed  HEAD:  Atraumatic, Normocephalic  EYES: EOMI, PERRLA, conjunctiva and sclera clear  NECK: Supple, No JVD, Normal thyroid  CHEST/LUNG: Clear bilaterally; No rales, rhonchi, wheezing, or rubs  HEART: Regular rate and rhythm; No murmurs, rubs, or gallops  ABDOMEN: Soft, Nontender, Nondistended; Bowel sounds present  EXTREMITIES:  2+ Peripheral Pulses, No clubbing, cyanosis, or edema    NERVOUS SYSTEM:  Cranial Nerves 2-12 intact [  ] Abnormal  [  ]  ROM: WFL all extremities [  ]  Abnormal [  ]  Motor Strength: WFL all extremities  [  ]  Abnormal [x  ] UEs 5-/5, left hip 4/5; RLE 5-/5  Sensation: intact to light touch [x  ] Abnormal [  ]  Reflexes: Symmetric [  ]  Abnormal [  ]    FUNCTIONAL STATUS:  Bed Mobility: Independent [  ]  Supervision [  ]  Needs Assistance [  ]  N/A [  ]  Transfers: Independent [  ]  Supervision [  ]  Needs Assistance [  ]  N/A [  ]   Ambulation: Independent [  ]  Supervision [  ]  Needs Assistance [  ]  N/A [  ]  ADL: Independent [  ] Requires Assistance [  ] N/A [  ]      LABS:                        10.8   4.86  )-----------( 112      ( 17 Aug 2023 05:32 )             32.1     08-17    140  |  105  |  13  ----------------------------<  105<H>  4.0   |  28  |  0.9    Ca    8.7      17 Aug 2023 05:32  Mg     2.0     08-16    TPro  5.4<L>  /  Alb  3.2<L>  /  TBili  0.3  /  DBili  x   /  AST  31  /  ALT  41  /  AlkPhos  84  08-17      Urinalysis Basic - ( 17 Aug 2023 05:32 )    Color: x / Appearance: x / SG: x / pH: x  Gluc: 105 mg/dL / Ketone: x  / Bili: x / Urobili: x   Blood: x / Protein: x / Nitrite: x   Leuk Esterase: x / RBC: x / WBC x   Sq Epi: x / Non Sq Epi: x / Bacteria: x        RADIOLOGY & ADDITIONAL STUDIES:    Assesment:

## 2023-08-17 NOTE — PROGRESS NOTE ADULT - SUBJECTIVE AND OBJECTIVE BOX
79yo F s/p L hip closed reduction percutaneous pinning for Left valgus impacted femoral neck fracture on 8/14, pod2      S&E at bedside. Pain well controlled. Denies fever/chills/CP/SOB. No other complaints    P/E:  NAD, Awake, alert  Nonlabored breathing    Vital Signs Last 24 Hrs  T(C): 37 (16 Aug 2023 08:31), Max: 37.7 (16 Aug 2023 00:30)  T(F): 98.6 (16 Aug 2023 08:31), Max: 99.8 (16 Aug 2023 00:30)  HR: 68 (16 Aug 2023 08:31) (68 - 74)  BP: 143/67 (16 Aug 2023 08:31) (129/59 - 147/66)  BP(mean): --  RR: 18 (16 Aug 2023 08:31) (18 - 18)  SpO2: 97% (16 Aug 2023 08:31) (91% - 97%)      LLE  Dressing c/d/i  SILT sp/dp/t/sural/saph  Firing ta/ehl/fhl/gs  DP pulse 2+, foot wwp                          11.2   5.47  )-----------( 103      ( 16 Aug 2023 06:53 )             33.1       08-15    138  |  107  |  15  ----------------------------<  95  4.3   |  20  |  0.9    Ca    8.5      15 Aug 2023 07:20  Mg     1.9     08-14    TPro  5.2<L>  /  Alb  3.4<L>  /  TBili  0.3  /  DBili  x   /  AST  99<H>  /  ALT  137<H>  /  AlkPhos  98  08-15      79yo F s/p L hip closed reduction percutaneous pinning for Left valgus impacted femoral neck fracture on 8/14, doing well postoperatively.    Weight bearing: WBAT LLE with walker  DVT ppx  pain control  Incentive spirometer  Diet  Home medications  PT/OT/rehab  Rest of care per primary  Please page Ortho w/ any questions/concerns    Please follow up with Dr. Larios 2 weeks from discharge [facility].
Orthopaedic Surgery Progress Note    PROCEDURE: LEFT HIP CLOSED REDUCTION, PERCUTANEOUS PINNING    S&E this afternoon s/p above procedure. Pain well controlled. Denies fever/chills/CP/SOB. No other complaints    P/E:  NAD, Awake, alert  Nonlabored breathing    LLE  Dressing c/d/i  Routine postop swelling of thigh  SILT sp/dp/t/sural/saph  Firing ta/ehl/fhl/gs  DP pulse 2+, foot wwp      A/P:   77yo F s/p L hip closed reduction percutaneous pinning for Left valgus impacted femoral neck fracture on 8/14, doing well postoperatively.    Weight bearing: WBAT LLE with walker  AM labs  DVT ppx: SQH POD0 qhs, scds - DC on ASA 81 mg BID x 6 weeks unless already on more potent AC  Postop abx for 24 hrs (pending completion)  Incentive spirometer  Diet  Pain control  Home medications  PT/OT/rehab  Rest of care per primary  Please page Ortho w/ any questions/concerns    Please follow up with Dr. Larios 2 weeks from discharge [facility].
Orthopaedic Surgery Progress Note    PROCEDURE: LEFT HIP CLOSED REDUCTION, PERCUTANEOUS PINNING    S&E this afternoon s/p above procedure. Pain well controlled. Denies fever/chills/CP/SOB. No other complaints    P/E:  NAD, Awake, alert  Nonlabored breathing    LLE  Dressing c/d/i  Routine postop swelling of thigh  SILT sp/dp/t/sural/saph  Firing ta/ehl/fhl/gs  DP pulse 2+, foot wwp      A/P:   77yo F s/p L hip closed reduction percutaneous pinning for Left valgus impacted femoral neck fracture on 8/14, doing well postoperatively.    Weight bearing: WBAT LLE with walker  AM labs  DVT ppx: SQH POD0 qhs, scds - DC on ASA 81 mg BID x 6 weeks unless already on more potent AC  Postop abx for 24 hrs (pending completion)  Incentive spirometer  Diet  Pain control  Home medications  PT/OT/rehab  Rest of care per primary  Please page Ortho w/ any questions/concerns    Please follow up with Dr. Larios 2 weeks from discharge [id number].
Orthopaedic Surgery Progress Note    PROCEDURE: RIGHT HIP CMN    S&E this evening s/p above procedure. Pain well controlled. Denies fever/chills/CP/SOB. No other complaints    P/E:  NAD, Awake, alert  Nonlabored breathing    RLE  Dressing c/d/i  Routine postop swelling of thigh  SILT sp/dp/t/sural/saph  Firing ta/ehl/fhl/gs  DP pulse 2+, foot wwp      A/P:   89yo M s/p Right Hip CMN s/p Right Intertrochanteric Femur Fracture on 8/14, doing well postoperatively.    Weight bearing: WBAT RLE  AM labs  DVT ppx: SQH POD1, scds - DC on ASA 81 mg BID x 6 weeks unless already on more potent AC  Postop abx for 24 hrs (pending completion)  Incentive spirometer  Diet  Pain control  Home medications  PT/OT/rehab  Rest of care per primary  Please page Ortho w/ any questions/concerns    Please have patient follow up with Dr. Martin at [address] [place] [address] 2 weeks upon discharge
TONYSARAH MIRELES  78y, Female  Allergy: morphine (Unknown)    Hospital Day: 4d    Patient seen and examined earlier today.  Pt reports having pain at site of surgery/fx.    PMH/PSH:  PAST MEDICAL & SURGICAL HISTORY:  RA (rheumatoid arthritis)      Chronic GERD      Bladder cancer      Benign ovarian cyst  RIGHT OVARY REMOVED      History of cholecystectomy      History of appendectomy      History of hip replacement, total, right  1998      H/O bilateral cataract extraction      H/O transurethral resection of bladder tumor (TURBT)          LAST 24-Hr EVENTS:    VITALS:  T(F): 97.8 (08-17-23 @ 13:33), Max: 99.1 (08-16-23 @ 17:09)  HR: 73 (08-17-23 @ 13:33)  BP: 154/68 (08-17-23 @ 13:33) (107/68 - 159/69)  RR: 18 (08-17-23 @ 13:33)  SpO2: 97% (08-17-23 @ 13:33)          TESTS & MEASUREMENTS:  Weight/BMI  67.2 (08-14-23 @ 13:35)  24.7 (08-14-23 @ 13:35)    08-15-23 @ 07:01  -  08-16-23 @ 07:00  --------------------------------------------------------  IN: 450 mL / OUT: 450 mL / NET: 0 mL    08-16-23 @ 07:01  -  08-17-23 @ 07:00  --------------------------------------------------------  IN: 1425 mL / OUT: 2700 mL / NET: -1275 mL    08-17-23 @ 07:01  -  08-17-23 @ 16:00  --------------------------------------------------------  IN: 0 mL / OUT: 1000 mL / NET: -1000 mL                            10.8   4.86  )-----------( 112      ( 17 Aug 2023 05:32 )             32.1       INR: 1.00 ratio (08-13-23 @ 14:01)    08-17    140  |  105  |  13  ----------------------------<  105<H>  4.0   |  28  |  0.9    Ca    8.7      17 Aug 2023 05:32  Mg     2.0     08-16    TPro  5.4<L>  /  Alb  3.2<L>  /  TBili  0.3  /  DBili  x   /  AST  31  /  ALT  41  /  AlkPhos  84  08-17    LIVER FUNCTIONS - ( 17 Aug 2023 05:32 )  Alb: 3.2 g/dL / Pro: 5.4 g/dL / ALK PHOS: 84 U/L / ALT: 41 U/L / AST: 31 U/L / GGT: x                 Urinalysis Basic - ( 17 Aug 2023 05:32 )    Color: x / Appearance: x / SG: x / pH: x  Gluc: 105 mg/dL / Ketone: x  / Bili: x / Urobili: x   Blood: x / Protein: x / Nitrite: x   Leuk Esterase: x / RBC: x / WBC x   Sq Epi: x / Non Sq Epi: x / Bacteria: x                            RADIOLOGY, ECG, & ADDITIONAL TESTS:  12 Lead ECG:   Ventricular Rate 69 BPM    Atrial Rate 69 BPM    P-R Interval 156 ms    QRS Duration 68 ms    Q-T Interval 394 ms    QTC Calculation(Bazett) 422 ms    P Axis 79 degrees    R Axis 80 degrees    T Axis 82 degrees    Diagnosis Line Normal sinus rhythm  Low voltage QRS  Poor R wave progression    Confirmed by NOBLE REY MD ([id number]) on 8/14/2023 7:47:23 AM (08-13-23 @ 13:46)      RECENT DIAGNOSTIC ORDERS:      MEDICATIONS:  MEDICATIONS  (STANDING):  chlorhexidine 2% Cloths 1 Application(s) Topical <User Schedule>  folic acid 1 milliGRAM(s) Oral <User Schedule>  heparin   Injectable 5000 Unit(s) SubCutaneous every 8 hours  methotrexate 15 milliGRAM(s) Oral every week  pantoprazole    Tablet 40 milliGRAM(s) Oral before breakfast  polyethylene glycol 3350 17 Gram(s) Oral daily  senna 2 Tablet(s) Oral at bedtime    MEDICATIONS  (PRN):  aluminum hydroxide/magnesium hydroxide/simethicone Suspension 30 milliLiter(s) Oral every 4 hours PRN Dyspepsia  melatonin 3 milliGRAM(s) Oral at bedtime PRN Insomnia  oxyCODONE    IR 5 milliGRAM(s) Oral every 8 hours PRN Moderate Pain (4 - 6)      HOME MEDICATIONS:  aspirin 81 mg oral capsule (08-16)  ClearLax oral powder for reconstitution (08-16)  folic acid 1 mg oral tablet (08-13)  methotrexate 2.5 mg oral tablet (08-13)  pantoprazole 40 mg oral delayed release tablet (08-16)      PHYSICAL EXAM:  GENERAL: NAD, well-developed.  HEAD:  Atraumatic, Normocephalic.  EYES: conjunctiva and sclera clear  CHEST/LUNG: GBAE. No wheezing or crackles   HEART: regular rate and rhythm; S1/S2.  ABDOMEN: Soft, Nontender, Nondistended  EXTREMITIES: swollen left hip compared to right, bandage in place dry/intact   PSYCH: AAOx3  NEUROLOGY: non-focal; moves all extremities         
SARAH TONY 78y Female  MRN#: [id number]   Hospital Day: 2d    HPI:  78F w/PMHx of RA on MTX, Hx of bladder CA s/p TURBT by Dr. 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  Patient reports was crouched and unloading her clothes washer when she tripped and fell over to her left side.  Patient experienced a large amount of pain and was unable to get up.  Patient was on floor approx 30m before she was found by a family member.  They were unable to get the patient up and activated EMS for transport to ED.  Patient with complaints of left hip pain, unable to straighten it or bare weight.  No CP/SOB.  No abdominal or urinary complaints.  Patient compliant with medicine and FU.     (13 Aug 2023 15:40)        OBJECTIVE  PAST MEDICAL & SURGICAL HISTORY  RA (rheumatoid arthritis)    Chronic GERD    Bladder cancer    Benign ovarian cyst  RIGHT OVARY REMOVED    History of cholecystectomy    History of appendectomy    History of hip replacement, total, right  1998    H/O bilateral cataract extraction    H/O transurethral resection of bladder tumor (TURBT)      ALLERGIES:  morphine (Unknown)    MEDICATIONS:  STANDING MEDICATIONS  ceFAZolin   IVPB 2000 milliGRAM(s) IV Intermittent every 8 hours  chlorhexidine 2% Cloths 1 Application(s) Topical <User Schedule>  folic acid 1 milliGRAM(s) Oral <User Schedule>  heparin   Injectable 5000 Unit(s) SubCutaneous every 8 hours  lactated ringers. 1000 milliLiter(s) IV Continuous <Continuous>  pantoprazole    Tablet 40 milliGRAM(s) Oral before breakfast    PRN MEDICATIONS  aluminum hydroxide/magnesium hydroxide/simethicone Suspension 30 milliLiter(s) Oral every 4 hours PRN  HYDROmorphone  Injectable 0.5 milliGRAM(s) IV Push every 6 hours PRN  melatonin 3 milliGRAM(s) Oral at bedtime PRN  ondansetron Injectable 4 milliGRAM(s) IV Push every 8 hours PRN      VITAL SIGNS: Last 24 Hours  T(C): 37.7 (15 Aug 2023 08:26), Max: 37.9 (15 Aug 2023 04:27)  T(F): 99.9 (15 Aug 2023 08:26), Max: 100.3 (15 Aug 2023 04:27)  HR: 74 (15 Aug 2023 08:26) (62 - 82)  BP: 122/58 (15 Aug 2023 08:26) (114/55 - 156/71)  BP(mean): --  RR: 18 (15 Aug 2023 08:26) (12 - 20)  SpO2: 98% (15 Aug 2023 08:26) (91% - 100%)    LABS:                        11.1   6.66  )-----------( 105      ( 15 Aug 2023 07:20 )             33.5     08-15    138  |  107  |  15  ----------------------------<  95  4.3   |  20  |  0.9    Ca    8.5      15 Aug 2023 07:20  Mg     1.9     08-14    TPro  5.2<L>  /  Alb  3.4<L>  /  TBili  0.3  /  DBili  x   /  AST  99<H>  /  ALT  137<H>  /  AlkPhos  98  08-15      Urinalysis Basic - ( 15 Aug 2023 07:20 )    Color: x / Appearance: x / SG: x / pH: x  Gluc: 95 mg/dL / Ketone: x  / Bili: x / Urobili: x   Blood: x / Protein: x / Nitrite: x   Leuk Esterase: x / RBC: x / WBC x   Sq Epi: x / Non Sq Epi: x / Bacteria: x            CARDIAC MARKERS ( 14 Aug 2023 11:12 )  x     / x     / 108 U/L / x     / x      CARDIAC MARKERS ( 13 Aug 2023 16:07 )  x     / x     / 73 U/L / x     / x              PHYSICAL EXAM:  GENERAL: NAD, well-developed.  HEAD:  Atraumatic, Normocephalic.  EYES: conjunctiva and sclera clear  CHEST/LUNG: GBAE. No wheezing or crackles   HEART: regular rate and rhythm; S1/S2.  ABDOMEN: Soft, Nontender, Nondistended  EXTREMITIES: swollen left hip compared to right, lax calf, IPP.   PSYCH: AAOx3.  NEUROLOGY: non-focal; moves all extremities failed PT  
SARAH TONY 78y Female  MRN#: [id number]   Hospital Day: 1d    HPI:  78F w/PMHx of RA on MTX, Hx of bladder CA s/p TURBT by Dr. 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  Patient reports was crouched and unloading her clothes washer when she tripped and fell over to her left side.  Patient experienced a large amount of pain and was unable to get up.  Patient was on floor approx 30m before she was found by a family member.  They were unable to get the patient up and activated EMS for transport to ED.  Patient with complaints of left hip pain, unable to straighten it or bare weight.  No CP/SOB.  No abdominal or urinary complaints.  Patient compliant with medicine and FU.     (13 Aug 2023 15:40)        OBJECTIVE  PAST MEDICAL & SURGICAL HISTORY  RA (rheumatoid arthritis)    Chronic GERD    Bladder cancer    Benign ovarian cyst  RIGHT OVARY REMOVED    History of cholecystectomy    History of appendectomy    History of hip replacement, total, right  1998    H/O bilateral cataract extraction    H/O transurethral resection of bladder tumor (TURBT)      ALLERGIES:  morphine (Unknown)    MEDICATIONS:  STANDING MEDICATIONS  ceFAZolin   IVPB 2000 milliGRAM(s) IV Intermittent every 8 hours  chlorhexidine 2% Cloths 1 Application(s) Topical <User Schedule>  folic acid 1 milliGRAM(s) Oral <User Schedule>  heparin   Injectable 5000 Unit(s) SubCutaneous every 8 hours  lactated ringers. 1000 milliLiter(s) IV Continuous <Continuous>  lactated ringers. 1000 milliLiter(s) IV Continuous <Continuous>    PRN MEDICATIONS  acetaminophen     Tablet .. 650 milliGRAM(s) Oral every 6 hours PRN  aluminum hydroxide/magnesium hydroxide/simethicone Suspension 30 milliLiter(s) Oral every 4 hours PRN  HYDROmorphone  Injectable 0.5 milliGRAM(s) IV Push every 10 minutes PRN  HYDROmorphone  Injectable 0.5 milliGRAM(s) IV Push every 6 hours PRN  melatonin 3 milliGRAM(s) Oral at bedtime PRN  ondansetron Injectable 4 milliGRAM(s) IV Push every 8 hours PRN  oxyCODONE    IR 5 milliGRAM(s) Oral once PRN      VITAL SIGNS: Last 24 Hours  T(C): 36.9 (14 Aug 2023 15:20), Max: 36.9 (14 Aug 2023 15:20)  T(F): 98.5 (14 Aug 2023 15:20), Max: 98.5 (14 Aug 2023 15:20)  HR: 62 (14 Aug 2023 16:20) (62 - 80)  BP: 143/60 (14 Aug 2023 16:20) (122/76 - 156/71)  BP(mean): --  RR: 12 (14 Aug 2023 16:20) (12 - 20)  SpO2: 100% (14 Aug 2023 16:20) (95% - 100%)    LABS:                        12.7   6.52  )-----------( 116      ( 14 Aug 2023 15:25 )             37.7     08-14    140  |  106  |  15  ----------------------------<  96  4.0   |  22  |  1.1    Ca    8.9      14 Aug 2023 15:25  Mg     1.9     08-14    TPro  5.5<L>  /  Alb  3.6  /  TBili  0.8  /  DBili  x   /  AST  238<H>  /  ALT  270<H>  /  AlkPhos  108  08-14    PT/INR - ( 13 Aug 2023 14:01 )   PT: 11.40 sec;   INR: 1.00 ratio         PTT - ( 13 Aug 2023 14:01 )  PTT:29.1 sec  Urinalysis Basic - ( 14 Aug 2023 15:25 )    Color: x / Appearance: x / SG: x / pH: x  Gluc: 96 mg/dL / Ketone: x  / Bili: x / Urobili: x   Blood: x / Protein: x / Nitrite: x   Leuk Esterase: x / RBC: x / WBC x   Sq Epi: x / Non Sq Epi: x / Bacteria: x        Creatine Kinase, Serum: 108 U/L (08-14-23 @ 11:12)      CARDIAC MARKERS ( 14 Aug 2023 11:12 )  x     / x     / 108 U/L / x     / x      CARDIAC MARKERS ( 13 Aug 2023 16:07 )  x     / x     / 73 U/L / x     / x              PHYSICAL EXAM:  in surgery/ PACU    
TONYSARAH MIRELES  78y, Female  Allergy: morphine (Unknown)    Hospital Day: 3d    Patient seen and examined earlier today.  Pt reports soreness in left leg.     PMH/PSH:  PAST MEDICAL & SURGICAL HISTORY:  RA (rheumatoid arthritis)      Chronic GERD      Bladder cancer      Benign ovarian cyst  RIGHT OVARY REMOVED      History of cholecystectomy      History of appendectomy      History of hip replacement, total, right  1998      H/O bilateral cataract extraction      H/O transurethral resection of bladder tumor (TURBT)          LAST 24-Hr EVENTS:    VITALS:  T(F): 97.2 (08-16-23 @ 12:58), Max: 99.8 (08-16-23 @ 00:30)  HR: 65 (08-16-23 @ 12:58)  BP: 102/65 (08-16-23 @ 12:58) (102/65 - 147/66)  RR: 18 (08-16-23 @ 12:58)  SpO2: 97% (08-16-23 @ 12:58)          TESTS & MEASUREMENTS:  Weight/BMI  67.2 (08-14-23 @ 13:35)  24.7 (08-14-23 @ 13:35)    08-14-23 @ 07:01  -  08-15-23 @ 07:00  --------------------------------------------------------  IN: 700 mL / OUT: 850 mL / NET: -150 mL    08-15-23 @ 07:01  -  08-16-23 @ 07:00  --------------------------------------------------------  IN: 450 mL / OUT: 450 mL / NET: 0 mL    08-16-23 @ 07:01  -  08-16-23 @ 14:57  --------------------------------------------------------  IN: 450 mL / OUT: 1100 mL / NET: -650 mL                            11.2   5.47  )-----------( 103      ( 16 Aug 2023 06:53 )             33.1       INR: 1.00 ratio (08-13-23 @ 14:01)    08-16    141  |  106  |  11  ----------------------------<  106<H>  4.4   |  26  |  0.8    Ca    8.7      16 Aug 2023 06:53  Mg     2.0     08-16    TPro  5.3<L>  /  Alb  3.5  /  TBili  0.3  /  DBili  x   /  AST  44<H>  /  ALT  62<H>  /  AlkPhos  92  08-16    LIVER FUNCTIONS - ( 16 Aug 2023 06:53 )  Alb: 3.5 g/dL / Pro: 5.3 g/dL / ALK PHOS: 92 U/L / ALT: 62 U/L / AST: 44 U/L / GGT: x                 Urinalysis Basic - ( 16 Aug 2023 06:53 )    Color: x / Appearance: x / SG: x / pH: x  Gluc: 106 mg/dL / Ketone: x  / Bili: x / Urobili: x   Blood: x / Protein: x / Nitrite: x   Leuk Esterase: x / RBC: x / WBC x   Sq Epi: x / Non Sq Epi: x / Bacteria: x                            RADIOLOGY, ECG, & ADDITIONAL TESTS:  12 Lead ECG:   Ventricular Rate 69 BPM    Atrial Rate 69 BPM    P-R Interval 156 ms    QRS Duration 68 ms    Q-T Interval 394 ms    QTC Calculation(Bazett) 422 ms    P Axis 79 degrees    R Axis 80 degrees    T Axis 82 degrees    Diagnosis Line Normal sinus rhythm  Low voltage QRS  Poor R wave progression    Confirmed by NOBLE REY MD ([id number]) on 8/14/2023 7:47:23 AM (08-13-23 @ 13:46)      RECENT DIAGNOSTIC ORDERS:  Complete Blood Count: AM Sched. Collection: 17-Aug-2023 04:30 (08-16-23 @ 14:42)  Comprehensive Metabolic Panel: AM Sched. Collection: 17-Aug-2023 04:30 (08-16-23 @ 14:42)      MEDICATIONS:  MEDICATIONS  (STANDING):  chlorhexidine 2% Cloths 1 Application(s) Topical <User Schedule>  folic acid 1 milliGRAM(s) Oral <User Schedule>  heparin   Injectable 5000 Unit(s) SubCutaneous every 8 hours  lactated ringers. 1000 milliLiter(s) (75 mL/Hr) IV Continuous <Continuous>  pantoprazole    Tablet 40 milliGRAM(s) Oral before breakfast  polyethylene glycol 3350 17 Gram(s) Oral daily  senna 2 Tablet(s) Oral at bedtime    MEDICATIONS  (PRN):  aluminum hydroxide/magnesium hydroxide/simethicone Suspension 30 milliLiter(s) Oral every 4 hours PRN Dyspepsia  HYDROmorphone  Injectable 0.5 milliGRAM(s) IV Push every 6 hours PRN Severe Pain (7 - 10)  melatonin 3 milliGRAM(s) Oral at bedtime PRN Insomnia  ondansetron Injectable 4 milliGRAM(s) IV Push every 8 hours PRN Nausea and/or Vomiting      HOME MEDICATIONS:  aspirin 81 mg oral capsule (08-16)  ClearLax oral powder for reconstitution (08-16)  folic acid 1 mg oral tablet (08-13)  methotrexate 2.5 mg oral tablet (08-13)  pantoprazole 40 mg oral delayed release tablet (08-16)      PHYSICAL EXAM:  GENERAL: NAD, well-developed.  HEAD:  Atraumatic, Normocephalic.  EYES: conjunctiva and sclera clear  CHEST/LUNG: GBAE. No wheezing or crackles   HEART: regular rate and rhythm; S1/S2.  ABDOMEN: Soft, Nontender, Nondistended  EXTREMITIES: swollen left hip compared to right, bandage in place dry/intact   PSYCH: AAOx3  NEUROLOGY: non-focal; moves all extremities

## 2023-08-18 ENCOUNTER — TRANSCRIPTION ENCOUNTER (OUTPATIENT)
Age: 78
End: 2023-08-18

## 2023-08-18 ENCOUNTER — INPATIENT (INPATIENT)
Facility: HOSPITAL | Age: 78
LOS: 12 days | Discharge: HOME HEALTH | DRG: 560 | End: 2023-08-31
Attending: PHYSICAL MEDICINE & REHABILITATION | Admitting: PHYSICAL MEDICINE & REHABILITATION
Payer: MEDICARE

## 2023-08-18 VITALS
HEART RATE: 69 BPM | TEMPERATURE: 99 F | DIASTOLIC BLOOD PRESSURE: 63 MMHG | SYSTOLIC BLOOD PRESSURE: 142 MMHG | RESPIRATION RATE: 18 BRPM | OXYGEN SATURATION: 96 %

## 2023-08-18 VITALS
SYSTOLIC BLOOD PRESSURE: 152 MMHG | RESPIRATION RATE: 18 BRPM | HEART RATE: 76 BPM | TEMPERATURE: 98 F | DIASTOLIC BLOOD PRESSURE: 68 MMHG

## 2023-08-18 DIAGNOSIS — N83.209 UNSPECIFIED OVARIAN CYST, UNSPECIFIED SIDE: Chronic | ICD-10-CM

## 2023-08-18 DIAGNOSIS — M84.459A PATHOLOGICAL FRACTURE, HIP, UNSPECIFIED, INITIAL ENCOUNTER FOR FRACTURE: ICD-10-CM

## 2023-08-18 DIAGNOSIS — Z98.890 OTHER SPECIFIED POSTPROCEDURAL STATES: Chronic | ICD-10-CM

## 2023-08-18 DIAGNOSIS — Z90.49 ACQUIRED ABSENCE OF OTHER SPECIFIED PARTS OF DIGESTIVE TRACT: Chronic | ICD-10-CM

## 2023-08-18 DIAGNOSIS — Z98.41 CATARACT EXTRACTION STATUS, RIGHT EYE: Chronic | ICD-10-CM

## 2023-08-18 DIAGNOSIS — Z96.641 PRESENCE OF RIGHT ARTIFICIAL HIP JOINT: Chronic | ICD-10-CM

## 2023-08-18 PROCEDURE — 97535 SELF CARE MNGMENT TRAINING: CPT | Mod: GO

## 2023-08-18 PROCEDURE — 93005 ELECTROCARDIOGRAM TRACING: CPT

## 2023-08-18 PROCEDURE — 97110 THERAPEUTIC EXERCISES: CPT | Mod: GP

## 2023-08-18 PROCEDURE — 82306 VITAMIN D 25 HYDROXY: CPT

## 2023-08-18 PROCEDURE — 97116 GAIT TRAINING THERAPY: CPT | Mod: GP

## 2023-08-18 PROCEDURE — 73502 X-RAY EXAM HIP UNI 2-3 VIEWS: CPT | Mod: LT

## 2023-08-18 PROCEDURE — 36415 COLL VENOUS BLD VENIPUNCTURE: CPT

## 2023-08-18 PROCEDURE — 99239 HOSP IP/OBS DSCHRG MGMT >30: CPT

## 2023-08-18 PROCEDURE — 97166 OT EVAL MOD COMPLEX 45 MIN: CPT | Mod: GO

## 2023-08-18 PROCEDURE — 97010 HOT OR COLD PACKS THERAPY: CPT | Mod: GP

## 2023-08-18 PROCEDURE — 80053 COMPREHEN METABOLIC PANEL: CPT

## 2023-08-18 PROCEDURE — 85025 COMPLETE CBC W/AUTO DIFF WBC: CPT

## 2023-08-18 PROCEDURE — 97162 PT EVAL MOD COMPLEX 30 MIN: CPT | Mod: GP

## 2023-08-18 PROCEDURE — 97530 THERAPEUTIC ACTIVITIES: CPT | Mod: GO

## 2023-08-18 PROCEDURE — 83735 ASSAY OF MAGNESIUM: CPT

## 2023-08-18 PROCEDURE — 93010 ELECTROCARDIOGRAM REPORT: CPT

## 2023-08-18 RX ORDER — FOLIC ACID 0.8 MG
1 TABLET ORAL
Refills: 0 | Status: DISCONTINUED | OUTPATIENT
Start: 2023-08-24 | End: 2023-08-31

## 2023-08-18 RX ORDER — LANOLIN ALCOHOL/MO/W.PET/CERES
3 CREAM (GRAM) TOPICAL AT BEDTIME
Refills: 0 | Status: DISCONTINUED | OUTPATIENT
Start: 2023-08-18 | End: 2023-08-30

## 2023-08-18 RX ORDER — POLYETHYLENE GLYCOL 3350 17 G/17G
17 POWDER, FOR SOLUTION ORAL
Refills: 0 | Status: DISCONTINUED | OUTPATIENT
Start: 2023-08-18 | End: 2023-08-28

## 2023-08-18 RX ORDER — MULTIVIT WITH MIN/MFOLATE/K2 340-15/3 G
296 POWDER (GRAM) ORAL ONCE
Refills: 0 | Status: DISCONTINUED | OUTPATIENT
Start: 2023-08-18 | End: 2023-08-30

## 2023-08-18 RX ORDER — POLYETHYLENE GLYCOL 3350 17 G/17G
17 POWDER, FOR SOLUTION ORAL DAILY
Refills: 0 | Status: DISCONTINUED | OUTPATIENT
Start: 2023-08-18 | End: 2023-08-18

## 2023-08-18 RX ORDER — METHOTREXATE 2.5 MG/1
15 TABLET ORAL
Refills: 0 | Status: DISCONTINUED | OUTPATIENT
Start: 2023-08-18 | End: 2023-08-31

## 2023-08-18 RX ORDER — SENNA PLUS 8.6 MG/1
2 TABLET ORAL AT BEDTIME
Refills: 0 | Status: DISCONTINUED | OUTPATIENT
Start: 2023-08-18 | End: 2023-08-20

## 2023-08-18 RX ORDER — PANTOPRAZOLE SODIUM 20 MG/1
40 TABLET, DELAYED RELEASE ORAL
Refills: 0 | Status: DISCONTINUED | OUTPATIENT
Start: 2023-08-18 | End: 2023-08-31

## 2023-08-18 RX ORDER — ENOXAPARIN SODIUM 100 MG/ML
40 INJECTION SUBCUTANEOUS EVERY 24 HOURS
Refills: 0 | Status: DISCONTINUED | OUTPATIENT
Start: 2023-08-19 | End: 2023-08-31

## 2023-08-18 RX ORDER — OXYCODONE AND ACETAMINOPHEN 5; 325 MG/1; MG/1
1 TABLET ORAL
Qty: 0 | Refills: 0 | DISCHARGE
Start: 2023-08-18

## 2023-08-18 RX ORDER — CHLORHEXIDINE GLUCONATE 213 G/1000ML
1 SOLUTION TOPICAL
Refills: 0 | Status: DISCONTINUED | OUTPATIENT
Start: 2023-08-18 | End: 2023-08-31

## 2023-08-18 RX ADMIN — OXYCODONE HYDROCHLORIDE 5 MILLIGRAM(S): 5 TABLET ORAL at 05:38

## 2023-08-18 RX ADMIN — POLYETHYLENE GLYCOL 3350 17 GRAM(S): 17 POWDER, FOR SOLUTION ORAL at 21:37

## 2023-08-18 RX ADMIN — HEPARIN SODIUM 5000 UNIT(S): 5000 INJECTION INTRAVENOUS; SUBCUTANEOUS at 13:42

## 2023-08-18 RX ADMIN — HEPARIN SODIUM 5000 UNIT(S): 5000 INJECTION INTRAVENOUS; SUBCUTANEOUS at 05:38

## 2023-08-18 RX ADMIN — PANTOPRAZOLE SODIUM 40 MILLIGRAM(S): 20 TABLET, DELAYED RELEASE ORAL at 05:38

## 2023-08-18 RX ADMIN — SENNA PLUS 2 TABLET(S): 8.6 TABLET ORAL at 21:37

## 2023-08-18 RX ADMIN — POLYETHYLENE GLYCOL 3350 17 GRAM(S): 17 POWDER, FOR SOLUTION ORAL at 12:28

## 2023-08-18 RX ADMIN — CHLORHEXIDINE GLUCONATE 1 APPLICATION(S): 213 SOLUTION TOPICAL at 05:42

## 2023-08-18 RX ADMIN — Medication 1 MILLIGRAM(S): at 08:36

## 2023-08-18 RX ADMIN — OXYCODONE HYDROCHLORIDE 5 MILLIGRAM(S): 5 TABLET ORAL at 00:02

## 2023-08-18 RX ADMIN — Medication 10 MILLIGRAM(S): at 21:36

## 2023-08-18 NOTE — DISCHARGE NOTE NURSING/CASE MANAGEMENT/SOCIAL WORK - PATIENT PORTAL LINK FT
You can access the FollowMyHealth Patient Portal offered by [facility] [place] by registering at the following website: [email or web address]. By joining [facility]’s FollowMyHealth portal, you will also be able to view your health information using other applications (apps) compatible with our system.

## 2023-08-18 NOTE — H&P ADULT - HISTORY OF PRESENT ILLNESS
78F w/PMHx of RA on MTX, Hx of bladder CA s/p TURBT by Dr. 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  Patient reports was crouched and unloading her clothes washer when she tripped and fell over to her left side.  Patient experienced a large amount of pain and was unable to get up.  Patient was on floor approx 30m before she was found by a family member.  They were unable to get the patient up and activated EMS for transport to ED.  Patient with complaints of left hip pain, unable to straighten it or bear weight.    Pt found to have a L femoral neck fracture and on 8/14/23 patient went to OR and underwent a closed refuction and percutaneous pinning without any complications.     Current level of function of patient prior to admission to rehab with PT on 8/18 reveals Katheryn bed mobility, CGA-Katheryn transfers, ambulation CG WBAT LLE RW 40ft. With OT on 8/18/23, bathing Katheryn, supervision UBD, modA LBD, toileting modA, supervision grooming, supervision eating    The patient was evaluated by the PM&R team once medically stable. The patient was found to have functional limitation in terms of muscle strength, endurance, physical mobility, and ability to carry out activities of daily living (self care, transfers, and ambulation). The patient was started on a course of bedside therapy, the pt is motivated and able to start 3 hours of therapy  daily for 6-7 days a week. the patient was deemed to be a good candidate for admission for acute inpatient rehab. The patient was admitted to acute inpatient rehab on 8/18/23.   78F w/PMHx of RA on MTX, Hx of bladder CA s/p TURBT by Dr. 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  Patient reports was crouched and unloading her clothes washer when she tripped and fell over to her left side.  Patient experienced a large amount of pain and was unable to get up.  Patient was on floor approx 30m before she was found by a family member.  They were unable to get the patient up and activated EMS for transport to ED.  Patient with complaints of left hip pain, unable to straighten it or bear weight.    Pt found to have a L femoral neck fracture and on 8/14/23 patient went to OR and underwent a closed refuction and percutaneous pinning without any complications.     Current level of function of patient prior to admission to rehab with PT on 8/18 reveals Katheryn bed mobility, CGA/Katheryn transfers, ambulation CG WBAT LLE RW 40ft. With OT on 8/18/23, bathing Katheryn, supervision UBD, modA LBD, toileting modA, supervision grooming, supervision eating    The patient was evaluated by the PM&R team once medically stable. The patient was found to have functional limitation in terms of muscle strength, endurance, physical mobility, and ability to carry out activities of daily living (self care, transfers, and ambulation). The patient was started on a course of bedside therapy, the pt is motivated and able to start 3 hours of therapy  daily for 6-7 days a week. the patient was deemed to be a good candidate for admission for acute inpatient rehab. The patient was admitted to acute inpatient rehab on 8/18/23.   78F w/PMHx of RA on MTX, Hx of bladder CA s/p TURBT by Dr. 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  Patient reports was crouched and unloading her clothes washer when she tripped and fell over to her left side.  Patient experienced a large amount of pain and was unable to get up.  Patient was on floor approx 30m before she was found by a family member.  They were unable to get the patient up and activated EMS for transport to ED.  Patient with complaints of left hip pain, unable to straighten it or bear weight.  She also has a recent history of 'dizziness', for which she had an extensive outpatient w/u, and has improved after Vestibular Therapy.     Pt found to have a L femoral neck fracture and on 8/14/23 patient went to OR and underwent a closed reduction and percutaneous pinning without any complications.   Her course was complicated by significant elevations in her Transaminase levels, now resolving. She also developed Thrombocytopenia which is stable.     Current level of function of patient prior to admission to rehab with PT on 8/18 reveals Katheryn bed mobility, CGA/ Katheryn transfers, ambulation CG WBAT LLE RW 40ft. With OT on 8/18/23, bathing Katheryn, supervision UBD, modA LBD, toileting modA, supervision grooming, supervision eating    The patient was evaluated by the PM&R team once medically stable. The patient was found to have functional limitation in terms of muscle strength, endurance, physical mobility, and ability to carry out activities of daily living (self care, transfers, and ambulation). The patient was started on a course of bedside therapy, the pt is motivated and able to start 3 hours of therapy  daily for 6-7 days a week. the patient was deemed to be a good candidate for admission for acute inpatient rehab. The patient was admitted to acute inpatient rehab on 8/18/23.

## 2023-08-18 NOTE — MEDICAL STUDENT ADULT H&P (EDUCATION) - NSHPSOCIALHISTORY_GEN_ALL_CORE
Remote 30 pack-year history of smoking; quit.  Drinks alcohol socially   She lives alone in a walk-up apartment with 10 steps to the apartment, but son lives in adjacent apartment. Remote 30 pack-year history of smoking; quit.  Drinks alcohol socially  She lives alone in a walk-up apartment with 10 steps to the apartment, but son lives in adjacent apartment.

## 2023-08-18 NOTE — CHART NOTE - NSCHARTNOTEFT_GEN_A_CORE
Ordered CT pelvis for preoperative planning
Called by RN, reporting no relief for left hip pain from MSO4, has received 6mg thus far.  Will try Hydromorphone 0.5mg IVP q6h PRN and titrate
PACU ANESTHESIA ADMISSION NOTE      Procedure: Closed reduction and percutaneous pinning of left hip      Post op diagnosis:  Fracture of femoral neck, left        ____  Intubated  TV:______       Rate: ______      FiO2: ______    _x___  Patent Airway    _x___  Full return of protective reflexes    _x___  Full recovery from anesthesia / back to baseline status    Vitals:    See anesthesia record      Mental Status:  _x___ Awake   _____ Alert   _____ Drowsy   _____ Sedated    Nausea/Vomiting:  _x___  NO       ______Yes,   See Post - Op Orders         Pain Scale (0-10):  _____    Treatment: ____ None    __x__ See Post - Op/PCA Orders    Post - Operative Fluids:   __x__ Oral   ____ See Post - Op Orders    Plan: Discharge:   ___Home       ___x__Floor     _____Critical Care    _____  Other:_________________    Comments:  No anesthesia issues or complications noted.  Discharge when criteria met.
Pt was seen and examined at the bedside. resting comfortably.  discharge to  for acute rehab.

## 2023-08-18 NOTE — H&P ADULT - NSHPSOCIALHISTORY_GEN_ALL_CORE
SHx: 20-30 pack year history, not a current smoker, 1/month etoh use, no rec drug use    PLOF: Independent with ADL/iADLs, ambulation without AD  Ls: Pt reports lives in a two family home alone in apartment with son and his family below. 9-10 MOHIT. Daughter lives nearby to help if needed. +driving.

## 2023-08-18 NOTE — H&P ADULT - NSHPREVIEWOFSYSTEMS_GEN_ALL_CORE
Constiutional:    [  x ] WNL           [   ] poor appetite   [   ] insomnia   [   ] tired   Cardio:                [ x  ] WNL           [   ] CP   [   ] LOUIS   [   ] palpitations               Resp:                   [x   ] WNL           [   ] SOB   [   ] cough   [   ] wheezing   GI:                        [   ] WNL           [ x  ] constipation   [   ] diarrhea   [   ] abdominal pain   [   ] nausea   [   ] emesis                                :                      [x   ] WNL           [   ] MCLEAN  [   ] dusuria   [   ] difficulty voiding             Endo:                   [x   ] WNL          [   ] po;yuria   [   ] temperature intolerance                 Skin:                     [   ] WNL          [   ] pain   [x   ] wound, surgical site   [   ] rash   MSK:                    [   ] WNL          [x   ] muscle pain   [  x ] joint pain/ stiffness L hip  [   ] muscle tenderness   [x   ] swelling   Neuro:                 [x   ] WNL          [   ] HA   [   ] change in vision   [   ] tremor   [   ] weakness   [   ]dysphagia              Cognitive:           [x   ] WNL           [   ]confusion      Psych:                  [x   ] WNL           [   ] hallucinations   [   ]agitation   [   ] delusion   [   ]depression Constiutional:    [  x ] WNL           [   ] poor appetite   [   ] insomnia   [   ] tired   Cardio:                [ x  ] WNL           [   ] CP   [   ] LOUIS   [   ] palpitations               Resp:                   [x   ] WNL           [   ] SOB   [   ] cough   [   ] wheezing   GI:                        [   ] WNL           [ x  ] constipation   [   ] diarrhea   [   ] abdominal pain   [   ] nausea   [   ] emesis                                :                      [x   ] WNL           [   ] MCLEAN  [   ] dysuria   [   ] difficulty voiding             Endo:                   [x   ] WNL          [   ] polyuria   [   ] temperature intolerance                 Skin:                     [   ] WNL          [   ] pain   [x   ] wound, surgical site   [   ] rash   MSK:                    [   ] WNL          [x   ] muscle pain   [  x ] joint pain/ stiffness L hip  [   ] muscle tenderness   [x   ] swelling   Neuro:                 [x   ] WNL          [   ] HA   [   ] change in vision   [   ] tremor   [   ] weakness   [   ]dysphagia              Cognitive:           [x   ] WNL           [   ]confusion      Psych:                  [x   ] WNL           [   ] hallucinations   [   ]agitation   [   ] delusion   [   ]depression Constitutional:    [  x ] WNL           [   ] poor appetite   [   ] insomnia   [   ] tired   Cardio:                [ x  ] WNL           [   ] CP   [   ] LOUIS   [   ] palpitations               Resp:                   [x   ] WNL           [   ] SOB   [   ] cough   [   ] wheezing   GI:                        [   ] WNL           [ x  ] constipation   [   ] diarrhea   [   ] abdominal pain   [   ] nausea   [   ] emesis                                :                      [x   ] WNL           [   ] MCLEAN  [   ] dysuria   [   ] difficulty voiding             Endo:                   [x   ] WNL          [   ] polyuria   [   ] temperature intolerance                 Skin:                     [   ] WNL          [   ] pain   [x   ] wound, surgical site   [   ] rash   MSK:                    [   ] WNL          [x   ] muscle pain   [  x ] joint pain/ stiffness L hip  [   ] muscle tenderness   [x   ] swelling   Neuro:                 [x   ] WNL          [   ] HA   [   ] change in vision   [   ] tremor   [   ] weakness   [   ]dysphagia              Cognitive:           [x   ] WNL           [   ]confusion      Psych:                  [x   ] WNL           [   ] hallucinations   [   ]agitation   [   ] delusion   [   ]depression

## 2023-08-18 NOTE — MEDICAL STUDENT ADULT H&P (EDUCATION) - NSHPLABSRESULTS_GEN_ALL_CORE
10.8   4.86  )-----------( 112      ( 17 Aug 2023 05:32 )             32.1     08-17    140  |  105  |  13  ----------------------------<  105<H>  4.0   |  28  |  0.9    Ca    8.7      17 Aug 2023 05:32    TPro  5.4<L>  /  Alb  3.2<L>  /  TBili  0.3  /  DBili  x   /  AST  31  /  ALT  41  /  AlkPhos  84  08-17      Urinalysis Basic - ( 17 Aug 2023 05:32 )    Color: x / Appearance: x / SG: x / pH: x  Gluc: 105 mg/dL / Ketone: x  / Bili: x / Urobili: x   Blood: x / Protein: x / Nitrite: x   Leuk Esterase: x / RBC: x / WBC x   Sq Epi: x / Non Sq Epi: x / Bacteria: x

## 2023-08-18 NOTE — MEDICAL STUDENT ADULT H&P (EDUCATION) - PROBLEM 1
rehab of L subcapital femoral neck fracture s/p closed reduction with percutaneous pinning Rehab of L subcapital femoral neck fracture s/p closed reduction with percutaneous pinning

## 2023-08-18 NOTE — OCCUPATIONAL THERAPY INITIAL EVALUATION ADULT - LEVEL OF INDEPENDENCE: TOILET, REHAB EVAL
Unsafe at this time 2* pt with poor standing balance 2* L knee pain. RN Marisol aware./unable to perform

## 2023-08-18 NOTE — PROVIDER CONTACT NOTE (OTHER) - SITUATION
pt's BP is elevated at 153/69, pt denies any pain at this time pt's BP is elevated at 153/69, HR 74 pt denies any pain at this time

## 2023-08-18 NOTE — DISCHARGE NOTE NURSING/CASE MANAGEMENT/SOCIAL WORK - NSDCPEFALRISK_GEN_ALL_CORE
For information on Fall & Injury Prevention, visit: [email or web address] OR  [email or web address] OR  https://www.cdc.gov/steadi/patient.html

## 2023-08-18 NOTE — OCCUPATIONAL THERAPY INITIAL EVALUATION ADULT - GENERAL OBSERVATIONS, REHAB EVAL
Pt seen 10:10-10:30. Pt received seated upright in bedside chair in NAD +IV lock, daughter at bedside. Pt left seated upright in bedside chair +IV lock +cold pack to L knee, daughter at bedside. NERY mondragon.

## 2023-08-18 NOTE — MEDICAL STUDENT ADULT H&P (EDUCATION) - PLAN 5
- resolved, most recent AST/ALT = 31/41  - Initial workup for drug-induced hepatitis ruled out  - Continue to monitor LFTs

## 2023-08-18 NOTE — PROVIDER CONTACT NOTE (OTHER) - ACTION/TREATMENT ORDERED:
Provider aware, no intervention
will assess medications and changed orders okay for pt okay not to have any IV assess
provider to order stat percocet

## 2023-08-18 NOTE — PROVIDER CONTACT NOTE (OTHER) - SITUATION
pt is c/o 10/10 pain she worked with both PT and OT. she is not due for another 5mg oxy until 1138, order is for q6hrs, she is also c/o L knee pain. I gave her ice but it is not helping.

## 2023-08-18 NOTE — MEDICAL STUDENT ADULT H&P (EDUCATION) - HISTORY OF PRESENT ILLNESS
78-year-old female with a PMHx of rheumatoid arthritis, R hip replacement, bladder cancer s/p TURBT, and posterior communicating artery aneurysm presented to the ED with L hip pain, L knee pain, and difficulty with ambulation after a mechanical fall when she tripped while unloading her washer; she was unable to get up for 30 minutes after the fall, when a family member eventually found her and called an ambulance. X ray of pelvis showed L subcapital femoral neck fracture; X ray of L knee showed no abnormalities. On 8/14 the patient underwent closed reduction with percutaneous pinning for L valgus impacted neck with 3 screws placed. During her postoperative hospital course, she was evaluated by PT, and was found to be independent with ambulation and ADLs prior to admission; currently she requires min-assist for bed mobility (1 person assist), contact guard/min-assist for transfers (1 person assist), and ambulates 40 ft with rolling walker and contact guard (1 person assist). She lives alone in a walk-up apartment with 10 steps to the apartment. Currently she is not receiving Tylenol due to slightly elevated LFT's, which has resolved. 78-year-old female with a PMHx of rheumatoid arthritis, R hip replacement, bladder cancer s/p TURBT, and posterior communicating artery aneurysm presented to the ED with L hip pain, L knee pain, and difficulty with ambulation after a mechanical fall when she tripped while unloading her washer; she was unable to get up for 30 minutes after the fall, when a family member eventually found her and called an ambulance. X ray of pelvis showed L subcapital femoral neck fracture; X ray of L knee showed no abnormalities. On 8/14 the patient underwent closed reduction with percutaneous pinning for L valgus impacted neck with 3 screws placed. During her postoperative hospital course, she was evaluated by PT, and was found to be independent with ambulation and ADLs prior to admission; currently she requires min-assist for bed mobility (1 person assist), contact guard/min-assist for transfers (1 person assist), and ambulates 40 ft with rolling walker and contact guard (1 person assist). She lives alone in a walk-up apartment with 10 steps to the apartment. LFTs were slightly elevated since 8/14, but has resolved and she is now receiving percocet for pain. She is improving in functional status with PT/OT. 78-year-old female with a PMHx of rheumatoid arthritis, R hip replacement, bladder cancer s/p TURBT, and posterior communicating artery aneurysm presented to the ED with L hip pain, L knee pain, and difficulty with ambulation after a fall when she tripped while unloading her washer; she was unable to get up for 30 minutes after the fall, when a family member eventually found her and called an ambulance. X ray of pelvis showed L subcapital femoral neck fracture; X ray of L knee showed no abnormalities. On 8/14 the patient underwent closed reduction with percutaneous pinning for L valgus impacted neck with 3 screws placed. During her postoperative hospital course, she was evaluated by PT, and was found to be independent with ambulation and ADLs prior to admission; currently she requires min-assist for bed mobility (1 person assist), contact guard/min-assist for transfers (1 person assist), and ambulates 40 ft with rolling walker and contact guard (1 person assist). She lives alone in a walk-up apartment with 10 steps to the apartment. LFTs were slightly elevated since 8/14, but has resolved and she is now receiving percocet for pain. She is improving in functional status with PT/OT.

## 2023-08-18 NOTE — MEDICAL STUDENT ADULT H&P (EDUCATION) - ASSESSMENT
78F who presents for rehab of L subcapital femoral neck fracture s/p t closed reduction with percutaneous pinning  78F  with a PMHx of rheumatoid arthritis, R hip replacement, bladder cancer s/p TURBT, and posterior communicating artery aneurysm who presents for rehab of L subcapital femoral neck fracture s/p closed reduction with percutaneous pinning with L hip + knee pain and difficulty walking. She suffered the fracture on 8/13 after mechanical fall while loading washer, with reduction and placement of 3 screws on 8/14 with no complications. OT/PT identified decreased function with ADLs and ambulation on initial evaluation, and she would medically benefit from admission to acute inpatient rehab with 3 hrs of intesive therapy daily. 78F  with a PMHx of rheumatoid arthritis, R hip replacement, bladder cancer s/p TURBT, and posterior communicating artery aneurysm who presents for rehab of L subcapital femoral neck fracture s/p closed reduction with percutaneous pinning with L hip + knee pain and difficulty walking. She suffered the fracture on 8/13 after mechanical fall while loading washer, with reduction and placement of 3 screws on 8/14 with no complications. OT/PT identified decreased function with ADLs and ambulation on initial evaluation, and she would medically benefit from admission to acute inpatient rehab with 3 hrs of intensive therapy daily.

## 2023-08-18 NOTE — H&P ADULT - ASSESSMENT
ASSESSMENT/PLAN  78F w/PMHx of RA on MTX, Hx of bladder CA s/p TURBT by Dr. 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  Patient reports was crouched and unloading her clothes washer when she tripped and fell over to her left side.  Pt with left femoral neck fracture s/p closed reduction and percutaneous pinning of left hip on 8/14 and admitted to acute rehab on 8/18.    #Rehab of Fall s/p L femoral neck fracture s/p closed reduction and percutaneous pinning of left hip on 8/14.  - Patient is on a full rehab program of 3 hours a day of PT, OT, and/or SLP for 5-6 days a week for a total of 15 hours a week.  Physiatry to address: Ambulatory dysfunction, Medical condition, ADLs  Physical therapy to address: Ambulation and transfers  Occupational therapy to address: ADLs  Speech therapy to address: speech and swallowing  Social work to address: Home services and barriers to discharge  Case Management/Care coordination to address: Home services and barriers to discharge  Rehab Nursing to address: Individualized bedside care  Precautions/restrictions: Safety/Fall precautions  -orthopedics following  - completed post op prophylactic Ancef for 24 hours   - percocet 5-325 q4hr prn mod pain; wean off narcotics as appropriate  -Weight bearing: WBAT LLE with walker    #Normocytic anemia; likely 2/2 postop blood loss.  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   - strict BP control, maintain SBP<160  - Pt planned to follow up [facility]RA after discharge    #Constipation  - c/w miralax, senna  - consider bisacodyl  - continue to monitor    #Misc  -GI/Bowel Mgmt: as above, protonix 40mg qAM  - Melatonin 3md qhs        -Skin:No active issues at this time      - Diet: Regular diet        Precautions / PROPHYLAXIS:      - Falls, safety    - Ortho: Weight bearing status: WBAT LLE with walker      - DVT prophylaxis: SQH 5000u q8hr ; on d/c aspirin 81 mg BID      MEDICAL PROGNOSIS: GOOD            REHAB POTENTIAL: GOOD             ESTIMATED DISPOSITION: HOME WITH HOME CARE       [ x ]  The goals of the IRF admission were discussed with the patient and or family member, who agreed             ELOS:  [     ] 7-14    [    ]  14-21    [    ]  Other    THERAPY ORDERS and INITIAL INDIVIDUALIZED PLAN OF CARE:  This initial individualized interdisciplinary plan of care, which was established by me (the attending physiatrist), is based on elements from the post admission evaluation. The interdisciplinary therapy program is to be at least 3 hrs a day, at least 5 days per week from from physical, occupational and/ or speech therapies as ordered by me below.      [ x  ] P.T. 90 mins. /day at least 5 out of 7 days:  [  x ] superficial  modalities prn, [ x  ] A/AAROM, [ x  ] PREs, [ x  ] transfer training,            [ x  ] progressive ambulation, [x   ] stairs                                               [ x  ] O.T. 90 mins. /day at least 5 out of 7 days::  [ x  ] modalities prn,  [ x  ]A/AAROM, [ x  ] PREs, [  x ] functional transfer training, [ x  ] ADL training           [   ] cognitive/ perceptual eval and training, [   ] splint eval                                                  [   ] S.L.P:  [   ] speech eval, [   ] swallow eval     [   ] Neuropsychology eval     [ x  ] Individualized rec. therapy      RATIONALE FOR INPATIENT ADMISSION - Patient demonstrates the following: (check all that apply)  [X] Medically appropriate for acute rehabilitation admission. Requires interdisiplinary therapy consisting of at least PT and OT, at least 3 hrs. a day at least 5 days a week  [X] Has attainable rehab goals with an appropriate initial discharge plan  [X] Has rehabilitation potential (expected to make a significant improvement within a reasonable period of time)  [X] Requires close medical management by a rehab physician, rehab nursing care,  and comprehensive interdisciplinary team (including PT, OT)    [X] Requires evaluation by a physiatrist at least 3 days a week to evaluate and manage and coordinate rehab and medical problems   ASSESSMENT/PLAN  78F w/PMHx of RA on MTX, Hx of bladder CA s/p TURBT by Dr. 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  Patient reports was crouched and unloading her clothes washer when she tripped and fell over to her left side.  Pt with left femoral neck fracture s/p closed reduction and percutaneous pinning of left hip on 8/14 and admitted to acute rehab on 8/18.    #Rehab of Fall s/p L femoral neck fracture s/p closed reduction and percutaneous pinning of left hip on 8/14.  - Patient is on a full rehab program of 3 hours a day of PT, OT, and/or SLP for 5-6 days a week for a total of 15 hours a week.  Physiatry to address: Ambulatory dysfunction, Medical condition, ADLs  Physical therapy to address: Ambulation and transfers  Occupational therapy to address: ADLs  Speech therapy to address: speech and swallowing  Social work to address: Home services and barriers to discharge  Case Management/Care coordination to address: Home services and barriers to discharge  Rehab Nursing to address: Individualized bedside care  Precautions/restrictions: Safety/Fall precautions  -orthopedics following  - completed post op prophylactic Ancef for 24 hours   - percocet 5-325 q4hr prn mod pain; wean off narcotics as appropriate  - Weight bearing: WBAT LLE with walker    #Normocytic anemia; likely 2/2 postop blood loss.  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   - strict BP control, maintain SBP<160  - Pt planned to follow up [facility]RA and with management at [facility] after discharge    #Constipation  - c/w miralax, senna  - consider bisacodyl  - continue to monitor    #Misc  -GI/Bowel Mgmt: as above, protonix 40mg qAM  - Melatonin 3md qhs   -Skin: No active issues at this time    - Diet: Regular diet        Precautions / PROPHYLAXIS:      - Falls, safety    - Ortho: Weight bearing status: WBAT LLE with walker      - DVT prophylaxis: SQH 5000u q8hr ; on d/c aspirin 81 mg BID      MEDICAL PROGNOSIS: GOOD            REHAB POTENTIAL: GOOD             ESTIMATED DISPOSITION: HOME WITH HOME CARE       [ x ]  The goals of the IRF admission were discussed with the patient and or family member, who agreed             ELOS:  [  x   ] 7-14    [    ]  14-21    [    ]  Other    THERAPY ORDERS and INITIAL INDIVIDUALIZED PLAN OF CARE:  This initial individualized interdisciplinary plan of care, which was established by me (the attending physiatrist), is based on elements from the post admission evaluation. The interdisciplinary therapy program is to be at least 3 hrs a day, at least 5 days per week from from physical, occupational and/ or speech therapies as ordered by me below.      [ x  ] P.T. 90 mins. /day at least 5 out of 7 days:  [  x ] superficial  modalities prn, [ x  ] A/AAROM, [ x  ] PREs, [ x  ] transfer training,            [ x  ] progressive ambulation, [x   ] stairs                                               [ x  ] O.T. 90 mins. /day at least 5 out of 7 days::  [ x  ] modalities prn,  [ x  ]A/AAROM, [ x  ] PREs, [  x ] functional transfer training, [ x  ] ADL training           [   ] cognitive/ perceptual eval and training, [   ] splint eval                                                  [   ] S.L.P:  [   ] speech eval, [   ] swallow eval     [   ] Neuropsychology eval     [ x  ] Individualized rec. therapy      RATIONALE FOR INPATIENT ADMISSION - Patient demonstrates the following: (check all that apply)  [X] Medically appropriate for acute rehabilitation admission. Requires interdisiplinary therapy consisting of at least PT and OT, at least 3 hrs. a day at least 5 days a week  [X] Has attainable rehab goals with an appropriate initial discharge plan  [X] Has rehabilitation potential (expected to make a significant improvement within a reasonable period of time)  [X] Requires close medical management by a rehab physician, rehab nursing care,  and comprehensive interdisciplinary team (including PT, OT)    [X] Requires evaluation by a physiatrist at least 3 days a week to evaluate and manage and coordinate rehab and medical problems   ASSESSMENT/PLAN  78F w/PMHx of RA on MTX, Hx of bladder CA s/p TURBT by Dr. 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  Patient reports was crouched and unloading her clothes washer when she tripped and fell over to her left side.  Pt with left femoral neck fracture s/p closed reduction and percutaneous pinning of left hip on 8/14 and admitted to acute rehab on 8/18.    #Rehab of Fall s/p L femoral neck fracture s/p closed reduction and percutaneous pinning of left hip on 8/14.  - Patient requires a full rehab program of 3 hours a day of PT, OT  Physiatry to address: Ambulatory dysfunction, Medical condition, ADLs  Physical therapy to address: Ambulation and transfers, balance and strength  Occupational therapy to address: ADLs and functional transfers  Social work to address: Home services and barriers to discharge  Case Management/Care coordination to address: Home services and barriers to discharge  Rehab Nursing to address: Individualized bedside care  Precautions/restrictions: Safety/Fall precautions  -orthopedics following  - completed post op prophylactic Ancef for 24 hours   - percocet 5-325 q4hr prn mod pain; wean off narcotics as appropriate  - Weight bearing: WBAT LLE with walker    #Normocytic anemia; likely 2/2 postop blood loss.  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   - strict BP control, maintain SBP<160  - Pt planned to follow up [facility]RA and with management at [facility] after discharge    #Constipation - no BM since admission  - c/w miralax, senna  - consider bisacodyl  - continue to monitor    #Misc  -GI/Bowel Mgmt: as above, protonix 40mg qAM  - Melatonin 3md qhs   -Skin: monitor incision    - Diet: Regular diet        Precautions / PROPHYLAXIS:      - Falls, safety    - Ortho: Weight bearing status: WBAT LLE with walker      - DVT prophylaxis: SQH 5000u q8hr ; on d/c aspirin 81 mg BID      MEDICAL PROGNOSIS: GOOD            REHAB POTENTIAL: GOOD             ESTIMATED DISPOSITION: HOME WITH HOME CARE       [ x ]  The goals of the IRF admission were discussed with the patient and family members, who agreed             ELOS:  [  x   ] 7-14    [    ]  14-21    [    ]  Other    THERAPY ORDERS and INITIAL INDIVIDUALIZED PLAN OF CARE:  This initial individualized interdisciplinary plan of care, which was established by me (the attending physiatrist), is based on elements from the post admission evaluation. The interdisciplinary therapy program is to be at least 3 hrs a day, at least 5 days per week from from physical, occupational and/ or speech therapies as ordered by me below.      [ x  ] P.T. 90 mins. /day at least 5 out of 7 days:  [  x ] superficial  modalities prn, [ x  ] A/AAROM, [ x  ] PREs, [ x  ] transfer training,            [ x  ] progressive ambulation, [x   ] stairs                                               [ x  ] O.T. 90 mins. /day at least 5 out of 7 days::  [ x  ] modalities prn,  [ x  ]A/AAROM, [ x  ] PREs, [  x ] functional transfer training, [ x  ] ADL training           [   ] cognitive/ perceptual eval and training, [   ] splint eval                                                  [   ] S.L.P:  [   ] speech eval, [   ] swallow eval     [   ] Neuropsychology eval     [ x  ] Individualized rec. therapy      RATIONALE FOR INPATIENT ADMISSION - Patient demonstrates the following: (check all that apply)  [X] Medically appropriate for acute rehabilitation admission. Requires interdisiplinary therapy consisting of at least PT and OT, at least 3 hrs. a day at least 5 days a week  [X] Has attainable rehab goals with an appropriate initial discharge plan  [X] Has rehabilitation potential (expected to make a significant improvement within a reasonable period of time)  [X] Requires close medical management by a rehab physician, rehab nursing care,  and comprehensive interdisciplinary team (including PT, OT)    [X] Requires evaluation by a physiatrist at least 3 days a week to evaluate and manage and coordinate rehab and medical problems

## 2023-08-18 NOTE — H&P ADULT - NSHPLABSRESULTS_GEN_ALL_CORE
10.8   4.86  )-----------( 112      ( 17 Aug 2023 05:32 )             32.1     08-17    140  |  105  |  13  ----------------------------<  105<H>  4.0   |  28  |  0.9    Ca    8.7      17 Aug 2023 05:32    TPro  5.4<L>  /  Alb  3.2<L>  /  TBili  0.3  /  DBili  x   /  AST  31  /  ALT  41  /  AlkPhos  84  08-17      Urinalysis Basic - ( 17 Aug 2023 05:32 )    Color: x / Appearance: x / SG: x / pH: x  Gluc: 105 mg/dL / Ketone: x  / Bili: x / Urobili: x   Blood: x / Protein: x / Nitrite: x   Leuk Esterase: x / RBC: x / WBC x   Sq Epi: x / Non Sq Epi: x / Bacteria: x      < from: Xray Hip 2-3 Views, Left (08.14.23 @ 17:04) >    IMPRESSION:    There has been interval percutaneous fixation of a left femoral neck   fracture with 3 cannulated screws. The hardware is unremarkable in   appearance. Skin staples are noted.    The patient is again status post right hip hemiarthroplasty. No new   fracture is seen. There is no dislocation.    < end of copied text >    < from: Xray Femur 2 Views, Left (08.13.23 @ 14:34) >    IMPRESSION:  Acute, nondisplaced left femoral neck fracture.    < end of copied text >    < from: Xray Chest 1 View- PORTABLE-Routine (Xray Chest 1 View- PORTABLE-Routine .) (08.13.23 @ 14:32) >      Impression:  No evidence of focal consolidation, pleural effusion or pneumothorax.    < end of copied text >    < from: Xray Knee 3 Views, Left (08.13.23 @ 14:32) >      IMPRESSION:  Acute, nondisplaced left femoral neck fracture.    < end of copied text >    < from: Xray Hip 2-3 Views, Left (08.13.23 @ 14:32) >    IMPRESSION:  Acute, nondisplaced left femoral neck fracture.    < end of copied text > 10.8   4.86  )-----------( 112      ( 17 Aug 2023 05:32 )             32.1     08-17    140  |  105  |  13  ----------------------------<  105<H>  4.0   |  28  |  0.9    Ca    8.7      17 Aug 2023 05:32    TPro  5.4<L>  /  Alb  3.2<L>  /  TBili  0.3  /  DBili  x   /  AST  31  /  ALT  41  /  AlkPhos  84  08-17      < from: Xray Hip 2-3 Views, Left (08.14.23 @ 17:04) >    IMPRESSION:    There has been interval percutaneous fixation of a left femoral neck   fracture with 3 cannulated screws. The hardware is unremarkable in   appearance. Skin staples are noted.    The patient is again status post right hip hemiarthroplasty. No new   fracture is seen. There is no dislocation.    < end of copied text >    < from: Xray Femur 2 Views, Left (08.13.23 @ 14:34) >    IMPRESSION:  Acute, nondisplaced left femoral neck fracture.    < end of copied text >    < from: Xray Chest 1 View- PORTABLE-Routine (Xray Chest 1 View- PORTABLE-Routine .) (08.13.23 @ 14:32) >      Impression:  No evidence of focal consolidation, pleural effusion or pneumothorax.    < end of copied text >    < from: Xray Knee 3 Views, Left (08.13.23 @ 14:32) >      IMPRESSION:  Acute, nondisplaced left femoral neck fracture.    < end of copied text >    < from: Xray Hip 2-3 Views, Left (08.13.23 @ 14:32) >    IMPRESSION:  Acute, nondisplaced left femoral neck fracture.    < end of copied text >

## 2023-08-18 NOTE — MEDICAL STUDENT ADULT H&P (EDUCATION) - NS MD HP STUD PMH FT
Rheumatoid arthritis  Bladder cancer s/p TURBT  Posterior communicating artery aneurysm (5.5 mm) - discovered incidentally after episode of vertigo 6 months ago  GERD

## 2023-08-18 NOTE — MEDICAL STUDENT ADULT H&P (EDUCATION) - NSHPPHYSICALEXAM_GEN_ALL_CORE
General: Awake and alert, Resting Comfortable                                HEENT: NCAT  Cardio: Regular rate and rhythm normal S1 and S2 present, no murmurs, rubs, or gallops, no abnormal heart sounds.                           Pulm: Lungs clear to auscultation with good air entry bilaterally, no crackles/rales or wheezing                    Abdomen: Nontender, nondistended, normal bowel sounds present in all 4 quadrants  : Moore catheter present                                            MSK: No joint swelling or tenderness                                    Ext: Distal pulses present, no tenderness, warmth or redness.   Skin: Erythematous, raised rash on chest and trunk    NEUROLOGICAL EXAM:  Cognitive: AAO x 3, fluent linear speech, intact repetition, follows 1 and 2 step commands 100%, 2 step commands 50%, able to spell WORLD forwards and backwards, attention and concentration intact. 3/3 memory recall.  Mood/Affect: Generally normal affect, normal mood  Communication: Fluent, no dysarthria, following commands   CN: PERRL, EOMI. No VF deficits, normal fundus. Normal sensation in V1, V2, and V3 segments bilaterally. No facial droop. Hearing intact to finger rub bilaterally, no nystagmus. Normal palatal elevation, uvula midline . 5/5 shoulder shrug bilaterally, 5/5 head turn. Midline tongue protrusion  Motor: 5/5 throughout b/l, except 2+/5 L hip flexion. Knee flexion/extension deferred due to pain. No pronator drift.   Tone: Normal tone.  Reflexes: Symmetric, +3 throughout. Montero's negative.   Coordination: FTN, HKS wnl, no dysmteria, no dysdiadochokinesia on rapid alternating movements.  Sensory: Intact to light touch throughout BLE and BUE  ROM: Full passive and active ROM. General: Awake and alert, Resting Comfortable                                HEENT: NCAT  Cardio: Regular rate and rhythm normal S1 and S2 present, no murmurs, rubs, or gallops, no abnormal heart sounds.                           Pulm: Lungs clear to auscultation with good air entry bilaterally, no crackles/rales or wheezing                    Abdomen: Nontender, nondistended, normal bowel sounds present in all 4 quadrants  : No ngo catheter present                                            MSK: No joint swelling or tenderness                                    Ext: Distal pulses present, no tenderness, warmth or redness.   Skin: No rash or erythema.    NEUROLOGICAL EXAM:  Cognitive: AAO x 3, fluent linear speech, intact repetition, follows 1 and 2 step commands 100%, 2 step commands 50%, able to spell WORLD forwards and backwards, attention and concentration intact. 3/3 memory recall.  Mood/Affect: Generally normal affect, normal mood  Communication: Fluent, no dysarthria, following commands   CN: PERRL, EOMI. No VF deficits, normal fundus. Normal sensation in V1, V2, and V3 segments bilaterally. No facial droop. Hearing intact to finger rub bilaterally, no nystagmus. Normal palatal elevation, uvula midline . 5/5 shoulder shrug bilaterally, 5/5 head turn. Midline tongue protrusion  Motor: 5/5 throughout b/l, except 2+/5 L hip flexion. Knee flexion/extension deferred due to pain. No pronator drift.   Tone: Normal tone.  Reflexes: Symmetric, +3 throughout. Montero's negative.   Coordination: FTN, HKS wnl, no dysmteria, no dysdiadochokinesia on rapid alternating movements.  Sensory: Intact to light touch throughout BLE and BUE  ROM: Full passive and active ROM. General: Awake and alert, Resting Comfortable                                HEENT: NCAT  Cardio: Regular rate and rhythm normal S1 and S2 present, no murmurs, rubs, or gallops, no abnormal heart sounds.                           Pulm: Lungs clear to auscultation with good air entry bilaterally, no crackles/rales or wheezing                    Abdomen: Nontender, nondistended, normal bowel sounds present in all 4 quadrants  : No ngo catheter present                                            MSK: Moderate tenderness of L knee. Mild joint swelling of 1st MCP joint b/l.                                   Ext: Distal pulses present, no tenderness, warmth or redness.   Skin: Surgical site present.    NEUROLOGICAL EXAM:  Cognitive: AAO x 3, speech, repetition, attention, concentration and memory grossly intact  Mood/Affect: Generally normal affect, normal mood  Communication: Fluent, no dysarthria, following commands   CN2-12: Grossly intact  Motor: 5/5 throughout b/l, except 2+/5 L hip flexion. Knee flexion/extension deferred due to pain. No pronator drift.   Tone: Normal tone.  Reflexes: Symmetric, +3 throughout. No clonus.   Coordination: FTN wnl no dysmetria, no dysdiadochokinesia on rapid alternating movements.  Sensory: Intact to light touch throughout BLE and BUE  ROM: Full passive and active ROM.

## 2023-08-18 NOTE — MEDICAL STUDENT ADULT H&P (EDUCATION) - NSHPREVIEWOFSYSTEMS_GEN_ALL_CORE
Constitutional No fever, no chills, no weight change.   Cardio: No chest pain. No palpitations.  Resp: No cough or dyspnea. No shortness of breath.   GI: . No nausea, no vomiting, no diarrhea, no constipation, no anorexia.  : No dysuria, frequency, urgency or difficulty voiding. No hematuria.  Endocrine: No polyuria or polydipsia  Skin: No rash or redness.  Musculoskeletal: L hip pain, L knee pain. No swelling or edema.  Neuro: No numbness/tingling. No headache, no dizziness.  Cognitive: No memory impairment. No confusion, no disorientation  Psychiatric: No anxiety, no depression. No hallucinations Constitutional No fever, no chills, no weight change.   Cardio: No chest pain. No palpitations.  Resp: No cough or dyspnea. No shortness of breath.   GI: Constipation - no bowel movement since operation (8/14). No nausea, no vomiting, no diarrhea, no anorexia.  : No dysuria, frequency, urgency or difficulty voiding. No hematuria.  Endocrine: No polyuria or polydipsia  Skin: No rash or redness.  Musculoskeletal: L hip pain, L knee pain. No swelling or edema.  Neuro: No numbness/tingling. No headache, no dizziness.  Cognitive: No memory impairment. No confusion, no disorientation  Psychiatric: No anxiety, no depression. No hallucinations

## 2023-08-18 NOTE — H&P ADULT - ATTENDING COMMENTS
I reviewed the chart and examined the patient with the resident and we discussed the findings and treatment plan.  The patient is tolerating the bedside rehab program well. I agree with the findings and treatment plan above, which I modified as indicated. The patient requires 3 hrs a day of acute inpatient rehab.   Patient with history significant for RA, on Methotrexate, and recent dizziness, but fully independent w/out device PTA, admitted after a mechanical fall resulting in left hip and knee pain. She was diagnosed with a left subcapital hip fracture and underwent CRPP and was cleared for WBAT. She now requires min assist for bed mobility, min A/ CG for bedside transfers and CG to ambulate short distances with a RW. She also requires min assist for dressing. PTA, she was fully independent in all self care. Her course was complicated by thrombocytopenia, which is stable, and elevated LFTs, which has improved. The patient requires acute interdisciplinary rehab including PT and OT to maximize function for safe d/c home in a reasonable time. The patient can tolerate and benefit from 3 hrs daily therapy and requires Physiatry f/u at least 3 days a week to monitor and  manage her thrombocytopenia and monitor for a flair of her RA and LFTs and to maximize her pain management to optimize her therapies.     I read, edited and agree with the Assessment:  #Rehab of Fall s/p L femoral neck fracture s/p closed reduction and percutaneous pinning of left hip on 8/14.  - Patient requires a full rehab program of 3 hours a day of PT, OT  Physiatry to address: Ambulatory dysfunction, Medical condition, ADLs  Physical therapy to address: Ambulation and transfers, balance and strength  Occupational therapy to address: ADLs and functional transfers  Social work to address: Home services and barriers to discharge  Case Management/Care coordination to address: Home services and barriers to discharge  Rehab Nursing to address: Individualized bedside care  Precautions/restrictions: Safety/Fall precautions  -orthopedics following  - completed post op prophylactic Ancef for 24 hours   - percocet 5-325 q4hr prn mod pain; wean off narcotics as appropriate  - Weight bearing: WBAT LLE with walker    #Normocytic anemia; likely 2/2 postop blood loss.  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   - strict BP control, maintain SBP<160  - Pt planned to follow up [facility]RA and with [facility] after discharge    #Constipation - no BM since admission  - c/w miralax, senna  - consider bisacodyl  - continue to monitor

## 2023-08-18 NOTE — H&P ADULT - NSHPPHYSICALEXAM_GEN_ALL_CORE
PHYSICAL EXAMINATION   VItals: T(C): 36.8 (08-18-23 @ 08:55), Max: 37.4 (08-17-23 @ 16:48)  HR: 74 (08-18-23 @ 08:55) (65 - 75)  BP: 153/69 (08-18-23 @ 08:55) (135/60 - 155/69)  RR: 18 (08-18-23 @ 08:55) (18 - 18)  SpO2: 97% (08-18-23 @ 08:55) (95% - 97%)    General:[  x ] NAD, Resting Comfortable,   [   ] other:                                HEENT: [ x  ] NC/AT, EOMI, PERRL , Normal Conjunctivae,   [   ] other:  Cardio: [x  ] RRR, no murmer,   [   ] other:                              Pulm: [x   ] No Respiratory Distress,  Lungs CTAB,   [   ] other:                       Abdomen: [ x  ]ND/NT, Soft,   [   ] other:    : [ x  ] NO MCLEAN CATHETER, [   ] MCLEAN CATHETER- no meatal tear, no discharge, [   ] other:                                            MSK: [   ] No joint swelling, Full ROM,   [  x ] other:   reduced ROM of L hip and knee 2/2 to pain and recent surgery. otherwise all other extremities full ROM                                      Ext: [  x ]No C/C/E, No calf tenderness,   [   ]other:    Skin: [   ]intact,   [ x  ] other:    surgical site of L hip present                                                               Neurological Examination:  Cognitive: [   x ] AAO x 3,   [    ]  other:                                                                      Attention:  [  x  ] intact,   [    ]  other:                            Memory: [ x   ] intact,    [    ]  other:     Mood/Affect: [  x  ] wnl,    [    ]  other:                                                                             Communication: [ x   ]Fluent, no dysarthria, following commands:  [    ] other:   CN II - XII:  [ x   ] intact,  [    ] other:                                                                                        Motor:   RIGHT UE: [x   ] WNL,  [   ] other: 5/5 throughout  LEFT    UE: [x   ] WNL,  [   ] other: 5/5 throughout  RIGHT LE: [ x  ] WNL,  [   ] other: 5/5 throughout  LEFT    LE: [   ] WNL,  [  x ] other: 2+/5 HF, did not assess KF/KE due to pain, 5/5 DF/PF    Tone: [ x   ] wnl,   [    ]  other:  DTRs: [ x  ]symmetric 3+, [   ] other:  Coordination:   [  x  ] intact,   [    ] other:  FTN intact BUE, unable to conduct HTS due to lack of ROM and pain                                                                         Sensory: [  x  ] Intact to light touch,   [    ] other: PHYSICAL EXAMINATION   VItals: T(C): 36.8 (08-18-23 @ 08:55), Max: 37.4 (08-17-23 @ 16:48)  HR: 74 (08-18-23 @ 08:55) (65 - 75)  BP: 153/69 (08-18-23 @ 08:55) (135/60 - 155/69)  RR: 18 (08-18-23 @ 08:55) (18 - 18)  SpO2: 97% (08-18-23 @ 08:55) (95% - 97%)    General:[  x ] NAD, Resting Comfortable,   [   ] other:                                HEENT: [ x  ] NC/AT, EOMI, PERRL , Normal Conjunctivae,   [   ] other:  Cardio: [x  ] RRR, no murmur,   [   ] other:                              Pulm: [x   ] No Respiratory Distress,  Lungs CTAB,   [   ] other:                       Abdomen: [ x  ]ND/NT, Soft,   [   ] other:    : [ x  ] NO MCLEAN CATHETER, [   ] MCLEAN CATHETER- no meatal tear, no discharge, [   ] other:                                            MSK: [   ] No joint swelling, Full ROM,   [  x ] other: Mild boutonniere deformity present in RUE thumb.  reduced ROM of L hip and knee 2/2 to pain and recent surgery. otherwise all other extremities full ROM                                      Ext: [  x ]No C/C/E, No calf tenderness,   [   ]other:    Skin: [   ]intact,   [ x  ] other:    surgical site of L hip present                                                               Neurological Examination:  Cognitive: [   x ] AAO x 3,   [    ]  other:                                                                      Attention:  [  x  ] intact,   [    ]  other:                            Memory: [ x   ] intact,    [    ]  other:     Mood/Affect: [  x  ] wnl,    [    ]  other:                                                                             Communication: [ x   ]Fluent, no dysarthria, following commands:  [    ] other:   CN II - XII:  [ x   ] intact,  [    ] other:                                                                                        Motor:   RIGHT UE: [x   ] WNL,  [   ] other: 5/5 throughout  LEFT    UE: [x   ] WNL,  [   ] other: 5/5 throughout  RIGHT LE: [ x  ] WNL,  [   ] other: 5/5 throughout  LEFT    LE: [   ] WNL,  [  x ] other: 2+/5 HF, unable to assess KF/KE due to pain, 5/5 DF/PF    Tone: [ x   ] wnl,   [    ]  other:  DTRs: [ x  ]symmetric 3+, [   ] other:  Coordination:   [  x  ] intact,   [    ] other:  FTN intact BUE, unable to conduct HTS due to lack of ROM and pain. proprioception intact in BLE                                                                         Sensory: [  x  ] Intact to light touch,   [    ] other: PHYSICAL EXAMINATION   VItals: T(C): 36.8 (08-18-23 @ 08:55), Max: 37.4 (08-17-23 @ 16:48)  HR: 74 (08-18-23 @ 08:55) (65 - 75)  BP: 153/69 (08-18-23 @ 08:55) (135/60 - 155/69)  RR: 18 (08-18-23 @ 08:55) (18 - 18)  SpO2: 97% (08-18-23 @ 08:55) (95% - 97%)    General:[  x ] NAD, Resting Comfortable,   [   ] other:                                HEENT: [ x  ] NC/AT, EOMI, PERRL , Normal Conjunctivae,   [   ] other:  Cardio: [x  ] RRR, no murmur,   [   ] other:                              Pulm: [x   ] No Respiratory Distress,  Lungs CTAB,   [   ] other:                       Abdomen: [ x  ]ND/NT, Soft,   [   ] other:    : [ x  ] NO MCLEAN CATHETER, [   ] MCLEAN CATHETER- no meatal tear, no discharge, [   ] other:                                            MSK: [   ] No joint swelling, Full ROM,   [  x ] other: Mild boutonniere deformity present in RUE thumb.  reduced ROM of L hip and knee 2/2 to pain and recent surgery. otherwise all other extremities full ROM                                      Ext: [  x ]No C/C/E, No calf tenderness,   [   ]other:    Skin: [   ]intact,   [ x  ] other:    surgical site of L hip present                                                               Neurological Examination:  Cognitive: [   x ] AAO x 3,   [    ]  other:                                                                      Attention:  [  x  ] intact,   [    ]  other:                            Memory: [ x   ] intact,    [    ]  other:     Mood/Affect: [  x  ] wnl,    [    ]  other:                                                                             Communication: [ x   ]Fluent, no dysarthria, following commands:  [    ] other:   CN II - XII:  [ x   ] intact,  [    ] other:                                                                                        Motor:   RIGHT UE: [x   ] WNL,  [   ] other: 5/5 throughout  LEFT    UE: [x   ] WNL,  [   ] other: 5/5 throughout  RIGHT LE: [ x  ] WNL,  [   ] other: 5/5 throughout  LEFT    LE: [   ] WNL,  [  x ] other: 2+/5 HF, unable to assess KF/KE due to pain, 5/5 DF/PF    Tone: [ x   ] wnl,   [    ]  other:  DTRs: [ x  ]symmetric 3+, [   ] other:  Coordination:   [  x  ] intact,   [    ] other:  FTN intact BUE, unable to conduct HTS due to lack of ROM and pain. proprioception intact in BLE                                                                         Sensory: [  x  ] Intact to light touch and proprioception   [    ] other:

## 2023-08-19 LAB
ALBUMIN SERPL ELPH-MCNC: 4 G/DL — SIGNIFICANT CHANGE UP (ref 3.5–5.2)
ALP SERPL-CCNC: 129 U/L — HIGH (ref 30–115)
ALT FLD-CCNC: 87 U/L — HIGH (ref 0–41)
ANION GAP SERPL CALC-SCNC: 16 MMOL/L — HIGH (ref 7–14)
AST SERPL-CCNC: 88 U/L — HIGH (ref 0–41)
BASOPHILS # BLD AUTO: 0.02 K/UL — SIGNIFICANT CHANGE UP (ref 0–0.2)
BASOPHILS NFR BLD AUTO: 0.3 % — SIGNIFICANT CHANGE UP (ref 0–1)
BILIRUB SERPL-MCNC: 0.6 MG/DL — SIGNIFICANT CHANGE UP (ref 0.2–1.2)
BUN SERPL-MCNC: 22 MG/DL — HIGH (ref 10–20)
CALCIUM SERPL-MCNC: 9.2 MG/DL — SIGNIFICANT CHANGE UP (ref 8.4–10.5)
CHLORIDE SERPL-SCNC: 108 MMOL/L — SIGNIFICANT CHANGE UP (ref 98–110)
CO2 SERPL-SCNC: 20 MMOL/L — SIGNIFICANT CHANGE UP (ref 17–32)
CREAT SERPL-MCNC: 0.9 MG/DL — SIGNIFICANT CHANGE UP (ref 0.7–1.5)
EGFR: 65 ML/MIN/1.73M2 — SIGNIFICANT CHANGE UP
EOSINOPHIL # BLD AUTO: 0.09 K/UL — SIGNIFICANT CHANGE UP (ref 0–0.7)
EOSINOPHIL NFR BLD AUTO: 1.4 % — SIGNIFICANT CHANGE UP (ref 0–8)
GLUCOSE SERPL-MCNC: 101 MG/DL — HIGH (ref 70–99)
HCT VFR BLD CALC: 39.2 % — SIGNIFICANT CHANGE UP (ref 37–47)
HGB BLD-MCNC: 13.1 G/DL — SIGNIFICANT CHANGE UP (ref 12–16)
IMM GRANULOCYTES NFR BLD AUTO: 0.3 % — SIGNIFICANT CHANGE UP (ref 0.1–0.3)
LYMPHOCYTES # BLD AUTO: 0.96 K/UL — LOW (ref 1.2–3.4)
LYMPHOCYTES # BLD AUTO: 14.5 % — LOW (ref 20.5–51.1)
MAGNESIUM SERPL-MCNC: 2.4 MG/DL — SIGNIFICANT CHANGE UP (ref 1.8–2.4)
MCHC RBC-ENTMCNC: 32.9 PG — HIGH (ref 27–31)
MCHC RBC-ENTMCNC: 33.4 G/DL — SIGNIFICANT CHANGE UP (ref 32–37)
MCV RBC AUTO: 98.5 FL — SIGNIFICANT CHANGE UP (ref 81–99)
MONOCYTES # BLD AUTO: 0.26 K/UL — SIGNIFICANT CHANGE UP (ref 0.1–0.6)
MONOCYTES NFR BLD AUTO: 3.9 % — SIGNIFICANT CHANGE UP (ref 1.7–9.3)
NEUTROPHILS # BLD AUTO: 5.25 K/UL — SIGNIFICANT CHANGE UP (ref 1.4–6.5)
NEUTROPHILS NFR BLD AUTO: 79.6 % — HIGH (ref 42.2–75.2)
NRBC # BLD: 0 /100 WBCS — SIGNIFICANT CHANGE UP (ref 0–0)
PLATELET # BLD AUTO: 154 K/UL — SIGNIFICANT CHANGE UP (ref 130–400)
PMV BLD: 12.4 FL — HIGH (ref 7.4–10.4)
POTASSIUM SERPL-MCNC: 4.2 MMOL/L — SIGNIFICANT CHANGE UP (ref 3.5–5)
POTASSIUM SERPL-SCNC: 4.2 MMOL/L — SIGNIFICANT CHANGE UP (ref 3.5–5)
PROT SERPL-MCNC: 6.3 G/DL — SIGNIFICANT CHANGE UP (ref 6–8)
RBC # BLD: 3.98 M/UL — LOW (ref 4.2–5.4)
RBC # FLD: 13.7 % — SIGNIFICANT CHANGE UP (ref 11.5–14.5)
SODIUM SERPL-SCNC: 144 MMOL/L — SIGNIFICANT CHANGE UP (ref 135–146)
WBC # BLD: 6.6 K/UL — SIGNIFICANT CHANGE UP (ref 4.8–10.8)
WBC # FLD AUTO: 6.6 K/UL — SIGNIFICANT CHANGE UP (ref 4.8–10.8)

## 2023-08-19 RX ADMIN — PANTOPRAZOLE SODIUM 40 MILLIGRAM(S): 20 TABLET, DELAYED RELEASE ORAL at 06:09

## 2023-08-19 RX ADMIN — POLYETHYLENE GLYCOL 3350 17 GRAM(S): 17 POWDER, FOR SOLUTION ORAL at 06:08

## 2023-08-19 RX ADMIN — CHLORHEXIDINE GLUCONATE 1 APPLICATION(S): 213 SOLUTION TOPICAL at 06:12

## 2023-08-19 RX ADMIN — POLYETHYLENE GLYCOL 3350 17 GRAM(S): 17 POWDER, FOR SOLUTION ORAL at 18:11

## 2023-08-19 RX ADMIN — ENOXAPARIN SODIUM 40 MILLIGRAM(S): 100 INJECTION SUBCUTANEOUS at 06:11

## 2023-08-19 RX ADMIN — SENNA PLUS 2 TABLET(S): 8.6 TABLET ORAL at 21:33

## 2023-08-19 RX ADMIN — Medication 10 MILLIGRAM(S): at 12:47

## 2023-08-19 RX ADMIN — Medication 5 MILLIGRAM(S): at 12:46

## 2023-08-19 NOTE — PROGRESS NOTE ADULT - SUBJECTIVE AND OBJECTIVE BOX
Patient is a 78y old  Female who presents with a chief complaint of Acute Rehabilitation of L femoral neck fracture s/p percutaneous pinning on 8/14/23 (18 Aug 2023 12:42)      HPI:  78F w/PMHx of RA on MTX, Hx of bladder CA s/p TURBT by Dr. 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  Patient reports was crouched and unloading her clothes washer when she tripped and fell over to her left side.  Patient experienced a large amount of pain and was unable to get up.  Patient was on floor approx 30m before she was found by a family member.  They were unable to get the patient up and activated EMS for transport to ED.  Patient with complaints of left hip pain, unable to straighten it or bear weight.  She also has a recent history of 'dizziness', for which she had an extensive outpatient w/u, and has improved after Vestibular Therapy.     Pt found to have a L femoral neck fracture and on 8/14/23 patient went to OR and underwent a closed reduction and percutaneous pinning without any complications.   Her course was complicated by significant elevations in her Transaminase levels, now resolving. She also developed Thrombocytopenia which is stable.     Current level of function of patient prior to admission to rehab with PT on 8/18 reveals Katheryn bed mobility, CGA/ Katheryn transfers, ambulation CG WBAT LLE RW 40ft. With OT on 8/18/23, bathing Katheryn, supervision UBD, modA LBD, toileting modA, supervision grooming, supervision eating    The patient was evaluated by the PM&R team once medically stable. The patient was found to have functional limitation in terms of muscle strength, endurance, physical mobility, and ability to carry out activities of daily living (self care, transfers, and ambulation). The patient was started on a course of bedside therapy, the pt is motivated and able to start 3 hours of therapy  daily for 6-7 days a week. the patient was deemed to be a good candidate for admission for acute inpatient rehab. The patient was admitted to acute inpatient rehab on 8/18/23.   (18 Aug 2023 12:42)      I examined the patient and reviewed the chart. There have been no significant changes since my history and physical except where documented below.    TODAY'S SUBJECTIVE & REVIEW OF SYMPTOMS:  No overnight events. Pt seen and examined at bedside.    Pt to start rehab evaluation today. Patient endorsed constipation, monitoring for regularity.     VSS, labwork pending      Constitutional:    [  x ] WNL           [   ] poor appetite   [   ] insomnia   [   ] tired   Cardio:                [ x  ] WNL           [   ] CP   [   ] LOUIS   [   ] palpitations               Resp:                   [x   ] WNL           [   ] SOB   [   ] cough   [   ] wheezing   GI:                        [   ] WNL           [ x  ] constipation   [   ] diarrhea   [   ] abdominal pain   [   ] nausea   [   ] emesis                                :                      [x   ] WNL           [   ] MCLEAN  [   ] dysuria   [   ] difficulty voiding             Endo:                   [x   ] WNL          [   ] polyuria   [   ] temperature intolerance                 Skin:                     [   ] WNL          [   ] pain   [x   ] wound, surgical site   [   ] rash   MSK:                    [   ] WNL          [x   ] muscle pain   [  x ] joint pain/ stiffness L hip  [   ] muscle tenderness   [x   ] swelling   Neuro:                 [x   ] WNL          [   ] HA   [   ] change in vision   [   ] tremor   [   ] weakness   [   ]dysphagia              Cognitive:           [x   ] WNL           [   ]confusion      Psych:                  [x   ] WNL           [   ] hallucinations   [   ]agitation   [   ] delusion   [   ]depression      PHYSICAL EXAM    Vital Signs (24 Hrs):  T(C): 36.7 (08-19-23 @ 05:34), Max: 37.2 (08-18-23 @ 16:38)  HR: 71 (08-19-23 @ 05:34) (69 - 76)  BP: 148/65 (08-19-23 @ 05:34) (135/63 - 152/68)  RR: 18 (08-19-23 @ 05:34) (18 - 18)  SpO2: 96% (08-18-23 @ 16:38) (96% - 97%)  Wt(kg): --  Daily     Daily     I&O's Summary        General:[  x ] NAD, Resting Comfortable,   [   ] other:                                HEENT: [ x  ] NC/AT, EOMI, PERRL , Normal Conjunctivae,   [   ] other:  Cardio: [x  ] RRR, no murmur,   [   ] other:                              Pulm: [x   ] No Respiratory Distress,  Lungs CTAB,   [   ] other:                       Abdomen: [ x  ]ND/NT, Soft,   [   ] other:    : [ x  ] NO MCLEAN CATHETER, [   ] MCLEAN CATHETER- no meatal tear, no discharge, [   ] other:                                            MSK: [   ] No joint swelling, Full ROM,   [  x ] other: Mild boutonniere deformity present in RUE thumb.  reduced ROM of L hip and knee 2/2 to pain and recent surgery. otherwise all other extremities full ROM                                      Ext: [  x ]No C/C/E, No calf tenderness,   [   ]other:    Skin: [   ]intact,   [ x  ] other:    surgical site of L hip present                                                               Neurological Examination:  Cognitive: [   x ] AAO x 3,   [    ]  other:                                                                      Attention:  [  x  ] intact,   [    ]  other:                            Memory: [ x   ] intact,    [    ]  other:     Mood/Affect: [  x  ] wnl,    [    ]  other:                                                                             Communication: [ x   ]Fluent, no dysarthria, following commands:  [    ] other:   CN II - XII:  [ x   ] intact,  [    ] other:                                                                                        Motor:   RIGHT UE: [x   ] WNL,  [   ] other: 5/5 throughout  LEFT    UE: [x   ] WNL,  [   ] other: 5/5 throughout  RIGHT LE: [ x  ] WNL,  [   ] other: 5/5 throughout  LEFT    LE: [   ] WNL,  [  x ] other: 2+/5 HF, unable to assess KF/KE due to pain, 5/5 DF/PF    Tone: [ x   ] wnl,   [    ]  other:  DTRs: [ x  ]symmetric 3+, [   ] other:  Coordination:   [  x  ] intact,   [    ] other:  FTN intact BUE, unable to conduct HTS due to lack of ROM and pain. proprioception intact in BLE                                                                         Sensory: [  x  ] Intact to light touch and proprioception      aluminum hydroxide/magnesium hydroxide/simethicone Suspension 30 milliLiter(s) Oral every 4 hours PRN  bisacodyl Suppository 10 milliGRAM(s) Rectal daily PRN  chlorhexidine 2% Cloths 1 Application(s) Topical <User Schedule>  enoxaparin Injectable 40 milliGRAM(s) SubCutaneous every 24 hours  magnesium citrate Oral Solution 296 milliLiter(s) Oral once PRN  melatonin 3 milliGRAM(s) Oral at bedtime PRN  methotrexate 15 milliGRAM(s) Oral <User Schedule>  oxycodone    5 mG/acetaminophen 325 mG 1 Tablet(s) Oral every 4 hours PRN  pantoprazole    Tablet 40 milliGRAM(s) Oral before breakfast  polyethylene glycol 3350 17 Gram(s) Oral two times a day  senna 2 Tablet(s) Oral at bedtime      RECENT LABS/IMAGING    AM labs pending   Patient is a 78y old  Female who presents with a chief complaint of Acute Rehabilitation of L femoral neck fracture s/p percutaneous pinning on 8/14/23 (18 Aug 2023 12:42)      HPI:  78F w/PMHx of RA on MTX, Hx of bladder CA s/p TURBT by Dr. 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  Patient reports was crouched and unloading her clothes washer when she tripped and fell over to her left side.  Patient experienced a large amount of pain and was unable to get up.  Patient was on floor approx 30m before she was found by a family member.  They were unable to get the patient up and activated EMS for transport to ED.  Patient with complaints of left hip pain, unable to straighten it or bear weight.  She also has a recent history of 'dizziness', for which she had an extensive outpatient w/u, and has improved after Vestibular Therapy.     Pt found to have a L femoral neck fracture and on 8/14/23 patient went to OR and underwent a closed reduction and percutaneous pinning without any complications.   Her course was complicated by significant elevations in her Transaminase levels, now resolving. She also developed Thrombocytopenia which is stable.     Current level of function of patient prior to admission to rehab with PT on 8/18 reveals Katheryn bed mobility, CGA/ Katheryn transfers, ambulation CG WBAT LLE RW 40ft. With OT on 8/18/23, bathing Katheryn, supervision UBD, modA LBD, toileting modA, supervision grooming, supervision eating    The patient was evaluated by the PM&R team once medically stable. The patient was found to have functional limitation in terms of muscle strength, endurance, physical mobility, and ability to carry out activities of daily living (self care, transfers, and ambulation). The patient was started on a course of bedside therapy, the pt is motivated and able to start 3 hours of therapy  daily for 6-7 days a week. the patient was deemed to be a good candidate for admission for acute inpatient rehab. The patient was admitted to acute inpatient rehab on 8/18/23.   (18 Aug 2023 12:42)      I examined the patient and reviewed the chart. There have been no significant changes since my history and physical except where documented below.    TODAY'S SUBJECTIVE & REVIEW OF SYMPTOMS:  No overnight events. Pt seen and examined at bedside.    Pt to start rehab evaluation today. Patient endorsed constipation, monitoring for regularity. will provide suppository today and consider enema tomorrow if no BM    VSS, labwork pending      Constitutional:    [  x ] WNL           [   ] poor appetite   [   ] insomnia   [   ] tired   Cardio:                [ x  ] WNL           [   ] CP   [   ] LOUIS   [   ] palpitations               Resp:                   [x   ] WNL           [   ] SOB   [   ] cough   [   ] wheezing   GI:                        [   ] WNL           [ x  ] constipation   [   ] diarrhea   [   ] abdominal pain   [   ] nausea   [   ] emesis                                :                      [x   ] WNL           [   ] MCLEAN  [   ] dysuria   [   ] difficulty voiding             Endo:                   [x   ] WNL          [   ] polyuria   [   ] temperature intolerance                 Skin:                     [   ] WNL          [   ] pain   [x   ] wound, surgical site   [   ] rash   MSK:                    [   ] WNL          [x   ] muscle pain   [  x ] joint pain/ stiffness L hip  [   ] muscle tenderness   [x   ] swelling   Neuro:                 [x   ] WNL          [   ] HA   [   ] change in vision   [   ] tremor   [   ] weakness   [   ]dysphagia              Cognitive:           [x   ] WNL           [   ]confusion      Psych:                  [x   ] WNL           [   ] hallucinations   [   ]agitation   [   ] delusion   [   ]depression      PHYSICAL EXAM    Vital Signs (24 Hrs):  T(C): 36.7 (08-19-23 @ 05:34), Max: 37.2 (08-18-23 @ 16:38)  HR: 71 (08-19-23 @ 05:34) (69 - 76)  BP: 148/65 (08-19-23 @ 05:34) (135/63 - 152/68)  RR: 18 (08-19-23 @ 05:34) (18 - 18)  SpO2: 96% (08-18-23 @ 16:38) (96% - 97%)  Wt(kg): --  Daily     Daily     I&O's Summary        General:[  x ] NAD, Resting Comfortable,   [   ] other:                                HEENT: [ x  ] NC/AT, EOMI, PERRL , Normal Conjunctivae,   [   ] other:  Cardio: [x  ] RRR, no murmur,   [   ] other:                              Pulm: [x   ] No Respiratory Distress,  Lungs CTAB,   [   ] other:                       Abdomen: [ x  ]ND/NT, Soft,   [   ] other:    : [ x  ] NO MCLEAN CATHETER, [   ] MCLEAN CATHETER- no meatal tear, no discharge, [   ] other:                                            MSK: [   ] No joint swelling, Full ROM,   [  x ] other: Mild boutonniere deformity present in RUE thumb.  reduced ROM of L hip and knee 2/2 to pain and recent surgery. otherwise all other extremities full ROM                                      Ext: [  x ]No C/C/E, No calf tenderness,   [   ]other:    Skin: [   ]intact,   [ x  ] other:    surgical site of L hip present                                                               Neurological Examination:  Cognitive: [   x ] AAO x 3,   [    ]  other:                                                                      Attention:  [  x  ] intact,   [    ]  other:                            Memory: [ x   ] intact,    [    ]  other:     Mood/Affect: [  x  ] wnl,    [    ]  other:                                                                             Communication: [ x   ]Fluent, no dysarthria, following commands:  [    ] other:   CN II - XII:  [ x   ] intact,  [    ] other:                                                                                        Motor:   RIGHT UE: [x   ] WNL,  [   ] other: 5/5 throughout  LEFT    UE: [x   ] WNL,  [   ] other: 5/5 throughout  RIGHT LE: [ x  ] WNL,  [   ] other: 5/5 throughout  LEFT    LE: [   ] WNL,  [  x ] other: 2+/5 HF, unable to assess KF/KE due to pain, 5/5 DF/PF    Tone: [ x   ] wnl,   [    ]  other:  DTRs: [ x  ]symmetric 3+, [   ] other:  Coordination:   [  x  ] intact,   [    ] other:  FTN intact BUE, unable to conduct HTS due to lack of ROM and pain. proprioception intact in BLE                                                                         Sensory: [  x  ] Intact to light touch and proprioception      aluminum hydroxide/magnesium hydroxide/simethicone Suspension 30 milliLiter(s) Oral every 4 hours PRN  bisacodyl Suppository 10 milliGRAM(s) Rectal daily PRN  chlorhexidine 2% Cloths 1 Application(s) Topical <User Schedule>  enoxaparin Injectable 40 milliGRAM(s) SubCutaneous every 24 hours  magnesium citrate Oral Solution 296 milliLiter(s) Oral once PRN  melatonin 3 milliGRAM(s) Oral at bedtime PRN  methotrexate 15 milliGRAM(s) Oral <User Schedule>  oxycodone    5 mG/acetaminophen 325 mG 1 Tablet(s) Oral every 4 hours PRN  pantoprazole    Tablet 40 milliGRAM(s) Oral before breakfast  polyethylene glycol 3350 17 Gram(s) Oral two times a day  senna 2 Tablet(s) Oral at bedtime      RECENT LABS/IMAGING    AM labs pending

## 2023-08-19 NOTE — PROGRESS NOTE ADULT - ASSESSMENT
ASSESSMENT/PLAN  78F w/PMHx of RA on MTX, Hx of bladder CA s/p TURBT by Dr. 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  Patient reports was crouched and unloading her clothes washer when she tripped and fell over to her left side.  Pt with left femoral neck fracture s/p closed reduction and percutaneous pinning of left hip on 8/14 and admitted to acute rehab on 8/18.    #Rehab of Fall s/p L femoral neck fracture s/p closed reduction and percutaneous pinning of left hip on 8/14.  - Patient requires a full rehab program of 3 hours a day of PT, OT  Physiatry to address: Ambulatory dysfunction, Medical condition, ADLs  Physical therapy to address: Ambulation and transfers, balance and strength  Occupational therapy to address: ADLs and functional transfers  Social work to address: Home services and barriers to discharge  Case Management/Care coordination to address: Home services and barriers to discharge  Rehab Nursing to address: Individualized bedside care  Precautions/restrictions: Safety/Fall precautions  -orthopedics following  - completed post op prophylactic Ancef for 24 hours   - percocet 5-325 q4hr prn mod pain; wean off narcotics as appropriate  - Weight bearing: WBAT LLE with walker    #Normocytic anemia; likely 2/2 postop blood loss.  - continue to monitor CBC    # Transaminitis; resolving  possibly drug induced- normalized as of 8/17  - no abdominal pain on exam  - continue to monitor LFTs as pt is on percocet    #Thrombocytopenia  - appears stable 112K  - monitor  - get HIT panel if worsens    #Hx of Rheumatoid arthritis  The patient is in an immunosuppressed state as expected to be associated with methotrexate therapy   -Continue with home methotrexate 50mg q1week and folic acid    #Hx of CKD stage IIIa? eGFR running 65-75  -Cr 0.9 on 8/17  - continue to monitor renal function    #5.5mm right posterior communicating artery aneurysm   - strict BP control, maintain SBP<160  - Pt planned to follow up [facility]RA and with management at [facility] after discharge    #Constipation - no BM since admission  Not associated with abdominal pain.  - c/w miralax, senna  - s/p bisacodyl, can consider suppository or enema if continues to persist.   - continue to monitor    #Misc  -GI/Bowel Mgmt: as above, protonix 40mg qAM  - Melatonin 3md qhs   -Skin: monitor incision    - Diet: Regular diet        Precautions / PROPHYLAXIS:      - Falls, safety    - Ortho: Weight bearing status: WBAT LLE with walker      - DVT prophylaxis: SQH 5000u q8hr ; on d/c aspirin 81 mg BID   ASSESSMENT/PLAN  78F w/PMHx of RA on MTX, Hx of bladder CA s/p TURBT by Dr. 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  Patient reports was crouched and unloading her clothes washer when she tripped and fell over to her left side.  Pt with left femoral neck fracture s/p closed reduction and percutaneous pinning of left hip on 8/14 and admitted to acute rehab on 8/18.    #Rehab of Fall s/p L femoral neck fracture s/p closed reduction and percutaneous pinning of left hip on 8/14.  - Patient requires a full rehab program of 3 hours a day of PT, OT  Physiatry to address: Ambulatory dysfunction, Medical condition, ADLs  Physical therapy to address: Ambulation and transfers, balance and strength  Occupational therapy to address: ADLs and functional transfers  Social work to address: Home services and barriers to discharge  Case Management/Care coordination to address: Home services and barriers to discharge  Rehab Nursing to address: Individualized bedside care  Precautions/restrictions: Safety/Fall precautions  -orthopedics following  - completed post op prophylactic Ancef for 24 hours   - percocet 5-325 q4hr prn mod pain; wean off narcotics as appropriate  - Weight bearing: WBAT LLE with walker    #Normocytic anemia; likely 2/2 postop blood loss.  - continue to monitor CBC    # Transaminitis; resolving  possibly drug induced- normalized as of 8/17  - no abdominal pain on exam  - continue to monitor LFTs as pt is on percocet    #Thrombocytopenia  - appears stable 112K  - monitor  - get HIT panel if worsens    #Hx of Rheumatoid arthritis  The patient is in an immunosuppressed state as expected to be associated with methotrexate therapy   -Continue with home methotrexate 50mg q1week and folic acid    #Hx of CKD stage IIIa? eGFR running 65-75  -Cr 0.9 on 8/17  - continue to monitor renal function    #5.5mm right posterior communicating artery aneurysm   - strict BP control, maintain SBP<160  - Pt planned to follow up [facility]RA and with management at [facility] after discharge    #Constipation - no BM since admission  Not associated with abdominal pain.  - c/w miralax, senna  - s/p bisacodyl,   - to order bisacodyl suppository 8/19, consider enema tomorrow if continues to persist.   - continue to monitor    #Misc  -GI/Bowel Mgmt: as above, protonix 40mg qAM  - Melatonin 3md qhs   -Skin: monitor incision    - Diet: Regular diet        Precautions / PROPHYLAXIS:      - Falls, safety    - Ortho: Weight bearing status: WBAT LLE with walker      - DVT prophylaxis: SQH 5000u q8hr ; on d/c aspirin 81 mg BID

## 2023-08-19 NOTE — PATIENT PROFILE ADULT - FALL HARM RISK - HARM RISK INTERVENTIONS

## 2023-08-19 NOTE — PATIENT PROFILE ADULT - MONEY FOR FOOD
no Gabapentin Pregnancy And Lactation Text: This medication is Pregnancy Category C and isn't considered safe during pregnancy. It is excreted in breast milk.

## 2023-08-20 RX ORDER — SENNA PLUS 8.6 MG/1
2 TABLET ORAL DAILY
Refills: 0 | Status: DISCONTINUED | OUTPATIENT
Start: 2023-08-20 | End: 2023-08-28

## 2023-08-20 RX ORDER — MAGNESIUM HYDROXIDE 400 MG/1
30 TABLET, CHEWABLE ORAL DAILY
Refills: 0 | Status: COMPLETED | OUTPATIENT
Start: 2023-08-20 | End: 2023-08-20

## 2023-08-20 RX ORDER — MAGNESIUM HYDROXIDE 400 MG/1
30 TABLET, CHEWABLE ORAL DAILY
Refills: 0 | Status: DISCONTINUED | OUTPATIENT
Start: 2023-08-20 | End: 2023-08-20

## 2023-08-20 RX ADMIN — MAGNESIUM HYDROXIDE 30 MILLILITER(S): 400 TABLET, CHEWABLE ORAL at 11:59

## 2023-08-20 RX ADMIN — ENOXAPARIN SODIUM 40 MILLIGRAM(S): 100 INJECTION SUBCUTANEOUS at 06:31

## 2023-08-20 RX ADMIN — SENNA PLUS 2 TABLET(S): 8.6 TABLET ORAL at 11:56

## 2023-08-20 RX ADMIN — POLYETHYLENE GLYCOL 3350 17 GRAM(S): 17 POWDER, FOR SOLUTION ORAL at 17:00

## 2023-08-20 RX ADMIN — POLYETHYLENE GLYCOL 3350 17 GRAM(S): 17 POWDER, FOR SOLUTION ORAL at 11:56

## 2023-08-20 RX ADMIN — PANTOPRAZOLE SODIUM 40 MILLIGRAM(S): 20 TABLET, DELAYED RELEASE ORAL at 06:30

## 2023-08-20 NOTE — PROGRESS NOTE ADULT - ASSESSMENT
ASSESSMENT/PLAN  78F w/PMHx of RA on MTX, Hx of bladder CA s/p TURBT by Dr. 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  Patient reports was crouched and unloading her clothes washer when she tripped and fell over to her left side.  Pt with left femoral neck fracture s/p closed reduction and percutaneous pinning of left hip on 8/14 and admitted to acute rehab on 8/18.    #Rehab of Fall s/p L femoral neck fracture s/p closed reduction and percutaneous pinning of left hip on 8/14.  - Patient requires a full rehab program of 3 hours a day of PT, OT  Physiatry to address: Ambulatory dysfunction, Medical condition, ADLs  Physical therapy to address: Ambulation and transfers, balance and strength  Occupational therapy to address: ADLs and functional transfers  Social work to address: Home services and barriers to discharge  Case Management/Care coordination to address: Home services and barriers to discharge  Rehab Nursing to address: Individualized bedside care  Precautions/restrictions: Safety/Fall precautions  -orthopedics following  - completed post op prophylactic Ancef for 24 hours   - percocet 5-325 q4hr prn mod pain; wean off narcotics as appropriate  - Weight bearing: WBAT LLE with walker    #Normocytic anemia; likely 2/2 postop blood loss.  - continue to monitor CBC    # Transaminitis; resolving  possibly drug induced- normalized as of 8/17  - no abdominal pain on exam  - continue to monitor LFTs as pt is on percocet    #Thrombocytopenia  - appears stable 112K  - monitor  - get HIT panel if worsens    #Hx of Rheumatoid arthritis  The patient is in an immunosuppressed state as expected to be associated with methotrexate therapy   -Continue with home methotrexate 50mg q1week and folic acid    #Hx of CKD stage IIIa? eGFR running 65-75  -Cr 0.9 on 8/17  - continue to monitor renal function    #5.5mm right posterior communicating artery aneurysm   - strict BP control, maintain SBP<160  - Pt planned to follow up [facility]RA and with management at [facility] after discharge    #Constipation - no BM since admission  Not associated with abdominal pain.  - c/w miralax, senna  - s/p bisacodyl,   - to order bisacodyl suppository 8/19, consider enema tomorrow if continues to persist.   - continue to monitor    #Misc  -GI/Bowel Mgmt: as above, protonix 40mg qAM  - Melatonin 3md qhs   -Skin: monitor incision    - Diet: Regular diet        Precautions / PROPHYLAXIS:      - Falls, safety    - Ortho: Weight bearing status: WBAT LLE with walker      - DVT prophylaxis: SQH 5000u q8hr ; on d/c aspirin 81 mg BID

## 2023-08-20 NOTE — PROGRESS NOTE ADULT - SUBJECTIVE AND OBJECTIVE BOX
Patient is a 78y old  Female who presents with a chief complaint of Acute Rehabilitation of L femoral neck fracture s/p percutaneous pinning on 8/14/23 (18 Aug 2023 12:42)      HPI:  78F w/PMHx of RA on MTX, Hx of bladder CA s/p TURBT by Dr. 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  Patient reports was crouched and unloading her clothes washer when she tripped and fell over to her left side.  Patient experienced a large amount of pain and was unable to get up.  Patient was on floor approx 30m before she was found by a family member.  They were unable to get the patient up and activated EMS for transport to ED.  Patient with complaints of left hip pain, unable to straighten it or bear weight.  She also has a recent history of 'dizziness', for which she had an extensive outpatient w/u, and has improved after Vestibular Therapy.     Pt found to have a L femoral neck fracture and on 8/14/23 patient went to OR and underwent a closed reduction and percutaneous pinning without any complications.   Her course was complicated by significant elevations in her Transaminase levels, now resolving. She also developed Thrombocytopenia which is stable.     Current level of function of patient prior to admission to rehab with PT on 8/18 reveals Katheryn bed mobility, CGA/ Katheryn transfers, ambulation CG WBAT LLE RW 40ft. With OT on 8/18/23, bathing Katheryn, supervision UBD, modA LBD, toileting modA, supervision grooming, supervision eating    The patient was evaluated by the PM&R team once medically stable. The patient was found to have functional limitation in terms of muscle strength, endurance, physical mobility, and ability to carry out activities of daily living (self care, transfers, and ambulation). The patient was started on a course of bedside therapy, the pt is motivated and able to start 3 hours of therapy  daily for 6-7 days a week. the patient was deemed to be a good candidate for admission for acute inpatient rehab. The patient was admitted to acute inpatient rehab on 8/18/23.   (18 Aug 2023 12:42)      I examined the patient and reviewed the chart. There have been no significant changes since my history and physical except where documented below.    TODAY'S SUBJECTIVE & REVIEW OF SYMPTOMS:  Pt seen and examined at bedside.  No overnight events.   Voiding urine spontaneously.    Pt had a small bowel movement today. Will give milk of magnesia, and suppository tonight after dinner since pt is reluctant for enema this time.    VS reviewed.    Constitutional:    [  x ] WNL           [   ] poor appetite   [   ] insomnia   [   ] tired   Cardio:                [ x  ] WNL           [   ] CP   [   ] LOUIS   [   ] palpitations               Resp:                   [x   ] WNL           [   ] SOB   [   ] cough   [   ] wheezing   GI:                        [   ] WNL           [ x  ] constipation   [   ] diarrhea   [   ] abdominal pain   [   ] nausea   [   ] emesis                                :                      [x   ] WNL           [   ] MCLEAN  [   ] dysuria   [   ] difficulty voiding             Endo:                   [x   ] WNL          [   ] polyuria   [   ] temperature intolerance                 Skin:                     [   ] WNL          [   ] pain   [x   ] wound, surgical site   [   ] rash   MSK:                    [   ] WNL          [x   ] muscle pain   [  x ] joint pain/ stiffness L hip  [   ] muscle tenderness   [x   ] swelling   Neuro:                 [x   ] WNL          [   ] HA   [   ] change in vision   [   ] tremor   [   ] weakness   [   ]dysphagia              Cognitive:           [x   ] WNL           [   ]confusion      Psych:                  [x   ] WNL           [   ] hallucinations   [   ]agitation   [   ] delusion   [   ]depression      PHYSICAL EXAM  Vital Signs (24 Hrs):  T(C): 36.6 (08-20-23 @ 04:40), Max: 36.8 (08-19-23 @ 21:42)  HR: 65 (08-20-23 @ 04:40) (65 - 80)  BP: 133/62 (08-20-23 @ 04:40) (126/70 - 133/62)  RR: 18 (08-20-23 @ 04:40) (18 - 18)  SpO2: --  Wt(kg): --  Daily     Daily     I&O's Summary        General:[  x ] NAD, Resting Comfortable,   [   ] other:                                HEENT: [ x  ] NC/AT, EOMI, PERRL , Normal Conjunctivae,   [   ] other:  Cardio: [x  ] RRR, no murmur,   [   ] other:                              Pulm: [x   ] No Respiratory Distress,  Lungs CTAB,   [   ] other:                       Abdomen: [ x  ]ND/NT, Soft,   [   ] other:    : [ x  ] NO MCLEAN CATHETER, [   ] MCLEAN CATHETER- no meatal tear, no discharge, [   ] other:                                            MSK: [   ] No joint swelling, Full ROM,   [  x ] other: Mild boutonniere deformity present in RUE thumb.  reduced ROM of L hip and knee 2/2 to pain and recent surgery. otherwise all other extremities full ROM                                      Ext: [  x ]No C/C/E, No calf tenderness,   [   ]other:    Skin: [   ]intact,   [ x  ] other:    surgical site of L hip present                                                               Neurological Examination:  Cognitive: [   x ] AAO x 3,   [    ]  other:                                                                      Attention:  [  x  ] intact,   [    ]  other:                            Memory: [ x   ] intact,    [    ]  other:     Mood/Affect: [  x  ] wnl,    [    ]  other:                                                                             Communication: [ x   ]Fluent, no dysarthria, following commands:  [    ] other:   CN II - XII:  [ x   ] intact,  [    ] other:                                                                                        Motor:   RIGHT UE: [x   ] WNL,  [   ] other: 5/5 throughout  LEFT    UE: [x   ] WNL,  [   ] other: 5/5 throughout  RIGHT LE: [ x  ] WNL,  [   ] other: 5/5 throughout  LEFT    LE: [   ] WNL,  [  x ] other: 2+/5 HF, unable to assess KF/KE due to pain, 5/5 DF/PF    Tone: [ x   ] wnl,   [    ]  other:  DTRs: [ x  ]symmetric 3+, [   ] other:  Coordination:   [  x  ] intact,   [    ] other:  FTN intact BUE, unable to conduct HTS due to lack of ROM and pain. proprioception intact in BLE                                                                         Sensory: [  x  ] Intact to light touch and proprioception    MEDICATIONS  (STANDING):  bisacodyl Suppository 10 milliGRAM(s) Rectal once  chlorhexidine 2% Cloths 1 Application(s) Topical <User Schedule>  enoxaparin Injectable 40 milliGRAM(s) SubCutaneous every 24 hours  magnesium hydroxide Suspension 30 milliLiter(s) Oral daily  methotrexate 15 milliGRAM(s) Oral <User Schedule>  pantoprazole    Tablet 40 milliGRAM(s) Oral before breakfast  polyethylene glycol 3350 17 Gram(s) Oral two times a day  senna 2 Tablet(s) Oral daily    MEDICATIONS  (PRN):  aluminum hydroxide/magnesium hydroxide/simethicone Suspension 30 milliLiter(s) Oral every 4 hours PRN Dyspepsia  magnesium citrate Oral Solution 296 milliLiter(s) Oral once PRN Constipation  melatonin 3 milliGRAM(s) Oral at bedtime PRN Insomnia  oxycodone    5 mG/acetaminophen 325 mG 1 Tablet(s) Oral every 4 hours PRN Moderate Pain (4 - 6)    RECENT LABS/IMAGING    AM labs pending   Patient is a 78y old  Female who presents with a chief complaint of Acute Rehabilitation of L femoral neck fracture s/p percutaneous pinning on 8/14/23 (18 Aug 2023 12:42)      HPI:  78F w/PMHx of RA on MTX, Hx of bladder CA s/p TURBT by Dr. 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  Patient reports was crouched and unloading her clothes washer when she tripped and fell over to her left side.  Patient experienced a large amount of pain and was unable to get up.  Patient was on floor approx 30m before she was found by a family member.  They were unable to get the patient up and activated EMS for transport to ED.  Patient with complaints of left hip pain, unable to straighten it or bear weight.  She also has a recent history of 'dizziness', for which she had an extensive outpatient w/u, and has improved after Vestibular Therapy.     Pt found to have a L femoral neck fracture and on 8/14/23 patient went to OR and underwent a closed reduction and percutaneous pinning without any complications.   Her course was complicated by significant elevations in her Transaminase levels, now resolving. She also developed Thrombocytopenia which is stable.     Current level of function of patient prior to admission to rehab with PT on 8/18 reveals Katheryn bed mobility, CGA/ Katheryn transfers, ambulation CG WBAT LLE RW 40ft. With OT on 8/18/23, bathing Katheryn, supervision UBD, modA LBD, toileting modA, supervision grooming, supervision eating    The patient was evaluated by the PM&R team once medically stable. The patient was found to have functional limitation in terms of muscle strength, endurance, physical mobility, and ability to carry out activities of daily living (self care, transfers, and ambulation). The patient was started on a course of bedside therapy, the pt is motivated and able to start 3 hours of therapy  daily for 6-7 days a week. the patient was deemed to be a good candidate for admission for acute inpatient rehab. The patient was admitted to acute inpatient rehab on 8/18/23.   (18 Aug 2023 12:42)      I examined the patient and reviewed the chart. There have been no significant changes since my history and physical except where documented below.    TODAY'S SUBJECTIVE & REVIEW OF SYMPTOMS:  Pt seen and examined at bedside.  No overnight events.   Voiding urine spontaneously.  Had a bowel movement this morning.  VS reviewed.    Constitutional:    [  x ] WNL           [   ] poor appetite   [   ] insomnia   [   ] tired   Cardio:                [ x  ] WNL           [   ] CP   [   ] LOUIS   [   ] palpitations               Resp:                   [x   ] WNL           [   ] SOB   [   ] cough   [   ] wheezing   GI:                        [   ] WNL           [ x  ] constipation   [   ] diarrhea   [   ] abdominal pain   [   ] nausea   [   ] emesis                                :                      [x   ] WNL           [   ] MCLEAN  [   ] dysuria   [   ] difficulty voiding             Endo:                   [x   ] WNL          [   ] polyuria   [   ] temperature intolerance                 Skin:                     [   ] WNL          [   ] pain   [x   ] wound, surgical site   [   ] rash   MSK:                    [   ] WNL          [x   ] muscle pain   [  x ] joint pain/ stiffness L hip  [   ] muscle tenderness   [x   ] swelling   Neuro:                 [x   ] WNL          [   ] HA   [   ] change in vision   [   ] tremor   [   ] weakness   [   ]dysphagia              Cognitive:           [x   ] WNL           [   ]confusion      Psych:                  [x   ] WNL           [   ] hallucinations   [   ]agitation   [   ] delusion   [   ]depression      PHYSICAL EXAM  Vital Signs (24 Hrs):  T(C): 36.6 (08-20-23 @ 04:40), Max: 36.8 (08-19-23 @ 21:42)  HR: 65 (08-20-23 @ 04:40) (65 - 80)  BP: 133/62 (08-20-23 @ 04:40) (126/70 - 133/62)  RR: 18 (08-20-23 @ 04:40) (18 - 18)  SpO2: --  Wt(kg): --  Daily     Daily     I&O's Summary        General:[  x ] NAD, Resting Comfortable,   [   ] other:                                HEENT: [ x  ] NC/AT, EOMI, PERRL , Normal Conjunctivae,   [   ] other:  Cardio: [x  ] RRR, no murmur,   [   ] other:                              Pulm: [x   ] No Respiratory Distress,  Lungs CTAB,   [   ] other:                       Abdomen: [ x  ]ND/NT, Soft,   [   ] other:    : [ x  ] NO MCLEAN CATHETER, [   ] MCLEAN CATHETER- no meatal tear, no discharge, [   ] other:                                            MSK: [   ] No joint swelling, Full ROM,   [  x ] other: Mild boutonniere deformity present in RUE thumb.  reduced ROM of L hip and knee 2/2 to pain and recent surgery. otherwise all other extremities full ROM                                      Ext: [  x ]No C/C/E, No calf tenderness,   [   ]other:    Skin: [   ]intact,   [ x  ] other:    surgical site of L hip present                                                               Neurological Examination:  Cognitive: [   x ] AAO x 3,   [    ]  other:                                                                      Attention:  [  x  ] intact,   [    ]  other:                            Memory: [ x   ] intact,    [    ]  other:     Mood/Affect: [  x  ] wnl,    [    ]  other:                                                                             Communication: [ x   ]Fluent, no dysarthria, following commands:  [    ] other:   CN II - XII:  [ x   ] intact,  [    ] other:                                                                                        Motor:   RIGHT UE: [x   ] WNL,  [   ] other: 5/5 throughout  LEFT    UE: [x   ] WNL,  [   ] other: 5/5 throughout  RIGHT LE: [ x  ] WNL,  [   ] other: 5/5 throughout  LEFT    LE: [   ] WNL,  [  x ] other: 2+/5 HF, unable to assess KF/KE due to pain, 5/5 DF/PF    Tone: [ x   ] wnl,   [    ]  other:  DTRs: [ x  ]symmetric 3+, [   ] other:  Coordination:   [  x  ] intact,   [    ] other:  FTN intact BUE, unable to conduct HTS due to lack of ROM and pain. proprioception intact in BLE                                                                         Sensory: [  x  ] Intact to light touch and proprioception    MEDICATIONS  (STANDING):  bisacodyl Suppository 10 milliGRAM(s) Rectal once  chlorhexidine 2% Cloths 1 Application(s) Topical <User Schedule>  enoxaparin Injectable 40 milliGRAM(s) SubCutaneous every 24 hours  magnesium hydroxide Suspension 30 milliLiter(s) Oral daily  methotrexate 15 milliGRAM(s) Oral <User Schedule>  pantoprazole    Tablet 40 milliGRAM(s) Oral before breakfast  polyethylene glycol 3350 17 Gram(s) Oral two times a day  senna 2 Tablet(s) Oral daily    MEDICATIONS  (PRN):  aluminum hydroxide/magnesium hydroxide/simethicone Suspension 30 milliLiter(s) Oral every 4 hours PRN Dyspepsia  magnesium citrate Oral Solution 296 milliLiter(s) Oral once PRN Constipation  melatonin 3 milliGRAM(s) Oral at bedtime PRN Insomnia  oxycodone    5 mG/acetaminophen 325 mG 1 Tablet(s) Oral every 4 hours PRN Moderate Pain (4 - 6)    RECENT LABS/IMAGING                          12.5   7.31  )-----------( 168      ( 21 Aug 2023 07:20 )             37.8     08-21    142  |  107  |  22<H>  ----------------------------<  104<H>  4.2   |  22  |  0.9    Ca    9.3      21 Aug 2023 07:20  Mg     2.5     08-21    TPro  6.1  /  Alb  4.0  /  TBili  0.5  /  DBili  x   /  AST  38  /  ALT  54<H>  /  AlkPhos  99  08-21      Urinalysis Basic - ( 21 Aug 2023 07:20 )    Color: x / Appearance: x / SG: x / pH: x  Gluc: 104 mg/dL / Ketone: x  / Bili: x / Urobili: x   Blood: x / Protein: x / Nitrite: x   Leuk Esterase: x / RBC: x / WBC x   Sq Epi: x / Non Sq Epi: x / Bacteria: x

## 2023-08-21 LAB
ALBUMIN SERPL ELPH-MCNC: 4 G/DL — SIGNIFICANT CHANGE UP (ref 3.5–5.2)
ALP SERPL-CCNC: 99 U/L — SIGNIFICANT CHANGE UP (ref 30–115)
ALT FLD-CCNC: 54 U/L — HIGH (ref 0–41)
ANION GAP SERPL CALC-SCNC: 13 MMOL/L — SIGNIFICANT CHANGE UP (ref 7–14)
AST SERPL-CCNC: 38 U/L — SIGNIFICANT CHANGE UP (ref 0–41)
BASOPHILS # BLD AUTO: 0.03 K/UL — SIGNIFICANT CHANGE UP (ref 0–0.2)
BASOPHILS NFR BLD AUTO: 0.4 % — SIGNIFICANT CHANGE UP (ref 0–1)
BILIRUB SERPL-MCNC: 0.5 MG/DL — SIGNIFICANT CHANGE UP (ref 0.2–1.2)
BUN SERPL-MCNC: 22 MG/DL — HIGH (ref 10–20)
CALCIUM SERPL-MCNC: 9.3 MG/DL — SIGNIFICANT CHANGE UP (ref 8.4–10.5)
CHLORIDE SERPL-SCNC: 107 MMOL/L — SIGNIFICANT CHANGE UP (ref 98–110)
CO2 SERPL-SCNC: 22 MMOL/L — SIGNIFICANT CHANGE UP (ref 17–32)
CREAT SERPL-MCNC: 0.9 MG/DL — SIGNIFICANT CHANGE UP (ref 0.7–1.5)
EGFR: 65 ML/MIN/1.73M2 — SIGNIFICANT CHANGE UP
EOSINOPHIL # BLD AUTO: 0.12 K/UL — SIGNIFICANT CHANGE UP (ref 0–0.7)
EOSINOPHIL NFR BLD AUTO: 1.6 % — SIGNIFICANT CHANGE UP (ref 0–8)
GLUCOSE SERPL-MCNC: 104 MG/DL — HIGH (ref 70–99)
HCT VFR BLD CALC: 37.8 % — SIGNIFICANT CHANGE UP (ref 37–47)
HGB BLD-MCNC: 12.5 G/DL — SIGNIFICANT CHANGE UP (ref 12–16)
IMM GRANULOCYTES NFR BLD AUTO: 0.4 % — HIGH (ref 0.1–0.3)
LYMPHOCYTES # BLD AUTO: 0.97 K/UL — LOW (ref 1.2–3.4)
LYMPHOCYTES # BLD AUTO: 13.3 % — LOW (ref 20.5–51.1)
MAGNESIUM SERPL-MCNC: 2.5 MG/DL — HIGH (ref 1.8–2.4)
MCHC RBC-ENTMCNC: 32.6 PG — HIGH (ref 27–31)
MCHC RBC-ENTMCNC: 33.1 G/DL — SIGNIFICANT CHANGE UP (ref 32–37)
MCV RBC AUTO: 98.7 FL — SIGNIFICANT CHANGE UP (ref 81–99)
MONOCYTES # BLD AUTO: 0.32 K/UL — SIGNIFICANT CHANGE UP (ref 0.1–0.6)
MONOCYTES NFR BLD AUTO: 4.4 % — SIGNIFICANT CHANGE UP (ref 1.7–9.3)
NEUTROPHILS # BLD AUTO: 5.84 K/UL — SIGNIFICANT CHANGE UP (ref 1.4–6.5)
NEUTROPHILS NFR BLD AUTO: 79.9 % — HIGH (ref 42.2–75.2)
NRBC # BLD: 0 /100 WBCS — SIGNIFICANT CHANGE UP (ref 0–0)
PLATELET # BLD AUTO: 168 K/UL — SIGNIFICANT CHANGE UP (ref 130–400)
PMV BLD: 12.1 FL — HIGH (ref 7.4–10.4)
POTASSIUM SERPL-MCNC: 4.2 MMOL/L — SIGNIFICANT CHANGE UP (ref 3.5–5)
POTASSIUM SERPL-SCNC: 4.2 MMOL/L — SIGNIFICANT CHANGE UP (ref 3.5–5)
PROT SERPL-MCNC: 6.1 G/DL — SIGNIFICANT CHANGE UP (ref 6–8)
RBC # BLD: 3.83 M/UL — LOW (ref 4.2–5.4)
RBC # FLD: 13.4 % — SIGNIFICANT CHANGE UP (ref 11.5–14.5)
SODIUM SERPL-SCNC: 142 MMOL/L — SIGNIFICANT CHANGE UP (ref 135–146)
WBC # BLD: 7.31 K/UL — SIGNIFICANT CHANGE UP (ref 4.8–10.8)
WBC # FLD AUTO: 7.31 K/UL — SIGNIFICANT CHANGE UP (ref 4.8–10.8)

## 2023-08-21 RX ADMIN — PANTOPRAZOLE SODIUM 40 MILLIGRAM(S): 20 TABLET, DELAYED RELEASE ORAL at 06:34

## 2023-08-21 RX ADMIN — ENOXAPARIN SODIUM 40 MILLIGRAM(S): 100 INJECTION SUBCUTANEOUS at 06:35

## 2023-08-21 NOTE — PROGRESS NOTE ADULT - SUBJECTIVE AND OBJECTIVE BOX
Patient is a 78y old  Female who presents with a chief complaint of Acute Rehabilitation of L femoral neck fracture s/p percutaneous pinning on 8/14/23 (18 Aug 2023 12:42)      HPI:  78F w/PMHx of RA on MTX, Hx of bladder CA s/p TURBT by Dr. 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  Patient reports was crouched and unloading her clothes washer when she tripped and fell over to her left side.  Patient experienced a large amount of pain and was unable to get up.  Patient was on floor approx 30m before she was found by a family member.  They were unable to get the patient up and activated EMS for transport to ED.  Patient with complaints of left hip pain, unable to straighten it or bear weight.  She also has a recent history of 'dizziness', for which she had an extensive outpatient w/u, and has improved after Vestibular Therapy.     Pt found to have a L femoral neck fracture and on 8/14/23 patient went to OR and underwent a closed reduction and percutaneous pinning without any complications.   Her course was complicated by significant elevations in her Transaminase levels, now resolving. She also developed Thrombocytopenia which is stable.     Current level of function of patient prior to admission to rehab with PT on 8/18 reveals Katheryn bed mobility, CGA/ Katheryn transfers, ambulation CG WBAT LLE RW 40ft. With OT on 8/18/23, bathing Katheryn, supervision UBD, modA LBD, toileting modA, supervision grooming, supervision eating    The patient was evaluated by the PM&R team once medically stable. The patient was found to have functional limitation in terms of muscle strength, endurance, physical mobility, and ability to carry out activities of daily living (self care, transfers, and ambulation). The patient was started on a course of bedside therapy, the pt is motivated and able to start 3 hours of therapy  daily for 6-7 days a week. the patient was deemed to be a good candidate for admission for acute inpatient rehab. The patient was admitted to acute inpatient rehab on 8/18/23.   (18 Aug 2023 12:42)    TODAY'S SUBJECTIVE & REVIEW OF SYMPTOMS:  Pt seen and examined at bedside.  No overnight events. Ongoing left hip soreness, improving. No other complaints. Participating well in therapies.    Constitutional:    [  x ] WNL           [   ] poor appetite   [   ] insomnia   [   ] tired   Cardio:                [ x  ] WNL           [   ] CP   [   ] LOUIS   [   ] palpitations               Resp:                   [x   ] WNL           [   ] SOB   [   ] cough   [   ] wheezing   GI:                        [   ] WNL           [ x  ] constipation   [   ] diarrhea   [   ] abdominal pain   [   ] nausea   [   ] emesis                                :                      [x   ] WNL           [   ] MCLEAN  [   ] dysuria   [   ] difficulty voiding             Endo:                   [x   ] WNL          [   ] polyuria   [   ] temperature intolerance                 Skin:                     [   ] WNL          [   ] pain   [x   ] wound, surgical site dry  [   ] rash   MSK:                    [   ] WNL          [x   ] muscle pain   [  x ] joint pain/ stiffness L hip  [   ] muscle tenderness     Neuro:                 [x   ] WNL          [   ] HA   [   ] change in vision   [   ] tremor   [   ] weakness   [   ]dysphagia              Cognitive:           [x   ] WNL           [   ]confusion      Psych:                  [x   ] WNL           [   ] hallucinations   [   ]agitation   [   ] delusion   [   ]depression      PHYSICAL EXAM    Vital Signs Last 24 Hrs  T(C): 36.7 (21 Aug 2023 12:53), Max: 37.1 (20 Aug 2023 21:00)  T(F): 98 (21 Aug 2023 12:53), Max: 98.7 (20 Aug 2023 21:00)  HR: 93 (21 Aug 2023 12:53) (66 - 93)  BP: 126/70 (21 Aug 2023 12:53) (126/70 - 148/66)  BP(mean): 90 (20 Aug 2023 21:00) (90 - 90)  RR: 18 (21 Aug 2023 12:53) (18 - 18)  SpO2: --      General:[  x ] NAD, Resting Comfortable,   [   ] other:                                HEENT: [ x  ] NC/AT, EOMI, PERRL , Normal Conjunctivae,   [   ] other:  Cardio: [x  ] RRR, no murmur,   [   ] other:                              Pulm: [x   ] No Respiratory Distress,  Lungs CTAB,   [   ] other:                       Abdomen: [ x  ]ND/NT, Soft,   [   ] other:    : [ x  ] NO MCLEAN CATHETER, [   ] MCLEAN CATHETER- no meatal tear, no discharge, [   ] other:                                            MSK: [   ] No joint swelling, Full ROM,   [  x ] other: Mild boutonniere deformity present in RUE thumb.  reduced ROM of L hip and knee 2/2 to pain and recent surgery. otherwise all other extremities full ROM                                      Ext: [  x ]No C/C/E, No calf tenderness,   [   ]other:    Skin: [   ]intact,   [ x  ] other:    surgical site of L hip present                                                               Neurological Examination:  Cognitive: [   x ] AAO x 3,   [    ]  other:                                                                      Attention:  [  x  ] intact,   [    ]  other:                            Memory: [ x   ] intact,    [    ]  other:     Mood/Affect: [  x  ] wnl,    [    ]  other:                                                                             Communication: [ x   ]Fluent, no dysarthria, following commands:  [    ] other:   CN II - XII:  [ x   ] intact,  [    ] other:                                                                                        Motor:   RIGHT UE: [x   ] WNL,  [   ] other: 5/5 throughout  LEFT    UE: [x   ] WNL,  [   ] other: 5/5 throughout  RIGHT LE: [ x  ] WNL,  [   ] other: 5/5 throughout  LEFT    LE: [   ] WNL,  [  x ] other: 2+/5 HF, unable to assess KF/KE due to pain, 5/5 DF/PF    Tone: [ x   ] wnl,   [    ]  other:  DTRs: [ x  ]symmetric 3+, [   ] other:  Coordination:   [  x  ] intact,   [    ] other:  FTN intact BUE, unable to conduct HTS due to lack of ROM and pain. proprioception intact in BLE                                                                         Sensory: [  x  ] Intact to light touch and proprioception    MEDICATIONS  (STANDING):  bisacodyl Suppository 10 milliGRAM(s) Rectal once  chlorhexidine 2% Cloths 1 Application(s) Topical <User Schedule>  enoxaparin Injectable 40 milliGRAM(s) SubCutaneous every 24 hours  magnesium hydroxide Suspension 30 milliLiter(s) Oral daily  methotrexate 15 milliGRAM(s) Oral <User Schedule>  pantoprazole    Tablet 40 milliGRAM(s) Oral before breakfast  polyethylene glycol 3350 17 Gram(s) Oral two times a day  senna 2 Tablet(s) Oral daily    MEDICATIONS  (PRN):  aluminum hydroxide/magnesium hydroxide/simethicone Suspension 30 milliLiter(s) Oral every 4 hours PRN Dyspepsia  magnesium citrate Oral Solution 296 milliLiter(s) Oral once PRN Constipation  melatonin 3 milliGRAM(s) Oral at bedtime PRN Insomnia  oxycodone    5 mG/acetaminophen 325 mG 1 Tablet(s) Oral every 4 hours PRN Moderate Pain (4 - 6)    RECENT LABS/IMAGING                          12.5   7.31  )-----------( 168      ( 21 Aug 2023 07:20 )             37.8     08-21    142  |  107  |  22<H>  ----------------------------<  104<H>  4.2   |  22  |  0.9    Ca    9.3      21 Aug 2023 07:20  Mg     2.5     08-21    TPro  6.1  /  Alb  4.0  /  TBili  0.5  /  DBili  x   /  AST  38  /  ALT  54<H>  /  AlkPhos  99  08-21

## 2023-08-21 NOTE — PROGRESS NOTE ADULT - ASSESSMENT
ASSESSMENT/PLAN  78F w/PMHx of RA on MTX, Hx of bladder CA s/p TURBT by Dr. 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  Patient reports was crouched and unloading her clothes washer when she tripped and fell over to her left side.  Pt with left femoral neck fracture s/p closed reduction and percutaneous pinning of left hip on 8/14 and admitted to acute rehab on 8/18.    #Rehab of Fall s/p L femoral neck fracture s/p closed reduction and percutaneous pinning of left hip on 8/14.  - Patient requires a full rehab program of 3 hours a day of PT, OT  Physiatry to address: Ambulatory dysfunction, Medical condition, ADLs  Physical therapy to address: Ambulation and transfers, balance and strength  Occupational therapy to address: ADLs and functional transfers  Social work to address: Home services and barriers to discharge  Case Management/Care coordination to address: Home services and barriers to discharge  Rehab Nursing to address: Individualized bedside care  Precautions/restrictions: Safety/Fall precautions  -orthopedics following  - completed post op prophylactic Ancef for 24 hours   - percocet 5-325 q4hr prn mod pain; wean off narcotics as appropriate  - Weight bearing: WBAT LLE with walker    #Normocytic anemia; likely 2/2 postop blood loss.  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  - c/w miralax, senna  - s/p bisacodyl,   - had BMs. Monitor    #Misc  -GI/Bowel Mgmt: as above, protonix 40mg qAM  - Melatonin 3md qhs   -Skin: monitor incision    - Diet: Regular diet        Precautions / PROPHYLAXIS:      - Falls, safety    - Ortho: Weight bearing status: WBAT LLE with walker      - DVT prophylaxis: SQH 5000u q8hr ; on d/c aspirin 81 mg BID

## 2023-08-22 PROCEDURE — 73502 X-RAY EXAM HIP UNI 2-3 VIEWS: CPT | Mod: 26,LT

## 2023-08-22 RX ORDER — IBUPROFEN 200 MG
600 TABLET ORAL DAILY
Refills: 0 | Status: DISCONTINUED | OUTPATIENT
Start: 2023-08-22 | End: 2023-08-22

## 2023-08-22 RX ORDER — IBUPROFEN 200 MG
600 TABLET ORAL
Refills: 0 | Status: DISCONTINUED | OUTPATIENT
Start: 2023-08-23 | End: 2023-08-31

## 2023-08-22 RX ORDER — ACETAMINOPHEN 500 MG
1000 TABLET ORAL EVERY 8 HOURS
Refills: 0 | Status: DISCONTINUED | OUTPATIENT
Start: 2023-08-22 | End: 2023-08-31

## 2023-08-22 RX ORDER — OXYCODONE HYDROCHLORIDE 5 MG/1
5 TABLET ORAL EVERY 4 HOURS
Refills: 0 | Status: DISCONTINUED | OUTPATIENT
Start: 2023-08-22 | End: 2023-08-29

## 2023-08-22 RX ADMIN — CHLORHEXIDINE GLUCONATE 1 APPLICATION(S): 213 SOLUTION TOPICAL at 06:59

## 2023-08-22 RX ADMIN — Medication 1000 MILLIGRAM(S): at 20:36

## 2023-08-22 RX ADMIN — Medication 600 MILLIGRAM(S): at 13:35

## 2023-08-22 RX ADMIN — PANTOPRAZOLE SODIUM 40 MILLIGRAM(S): 20 TABLET, DELAYED RELEASE ORAL at 06:59

## 2023-08-22 RX ADMIN — Medication 600 MILLIGRAM(S): at 13:02

## 2023-08-22 RX ADMIN — ENOXAPARIN SODIUM 40 MILLIGRAM(S): 100 INJECTION SUBCUTANEOUS at 06:59

## 2023-08-22 RX ADMIN — Medication 1000 MILLIGRAM(S): at 23:07

## 2023-08-22 RX ADMIN — OXYCODONE HYDROCHLORIDE 5 MILLIGRAM(S): 5 TABLET ORAL at 23:11

## 2023-08-22 NOTE — CHART NOTE - NSCHARTNOTEFT_GEN_A_CORE
78F w/PMHx of RA on MTX, Hx of bladder CA s/p TURBT by Dr. 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  Patient reports was crouched and unloading her clothes washer when she tripped and fell over to her left side. She was found to have a L femoral neck fracture and on 8/14/23 patient went to OR and underwent a closed reduction and percutaneous pinning (CRPP) without any complications. She also had a R hip hemiarthroplasty 25years ago when she fell at a wedding.  Her course was complicated by significant elevations in her Transaminase levels, now resolved. She also developed Thrombocytopenia which is stable.     She c/o a lot of pain in left hip today; xrays done today are stable:  < from: Xray Hip 2-3 Views, Left (08.22.23 @ 13:06) >    FINDINGS/  IMPRESSION:    The patient is again status post percutaneous screw fixation of a left   femoral neck fracture. The hardware is intact. Fracture alignment is not   appreciably changed. Skin staples are again present.    No acute fracture is seen. There is no dislocation. The patient is again   status post right hip hemiarthroplasty, without evidence of complication.    < end of copied text >    She takes ibuprofen 600mg at home with relief, so this was added q 8AM; also d/c'd percocet 5/325 q4h prn and changed instead to  Tylenol 1000mg po q8h and oxycodone 5mg po q4h prn.    Labs and VS are stable:    Vital Signs Last 24 Hrs  T(C): 36.7 (22 Aug 2023 12:43), Max: 36.7 (22 Aug 2023 12:43)  T(F): 98.1 (22 Aug 2023 12:43), Max: 98.1 (22 Aug 2023 12:43)  HR: 83 (22 Aug 2023 12:43) (66 - 83)  BP: 125/77 (22 Aug 2023 12:43) (125/77 - 135/62)  BP(mean): 86 (21 Aug 2023 20:32) (86 - 86)  RR: 18 (22 Aug 2023 12:43) (18 - 18)  SpO2: --      LABS:             12.5   7.31  )-----------( 168      ( 21 Aug 2023 07:20 )             37.8     08-21    142  |  107  |  22<H>  ----------------------------<  104<H>  4.2   |  22  |  0.9    Ca    9.3      21 Aug 2023 07:20  Mg     2.5     08-21    TPro  6.1  /  Alb  4.0  /  TBili  0.5  /  DBili  x   /  AST  38  /  ALT  54<H>  /  AlkPhos  99  08-21      Advised the patient to take prn oxycodone if needed before the pain gets too severe; advised her to take it prior to therapy.  Will continue to monitor and adjust meds as needed.  Monitor labs and VS and continue current meds and rehab program.
Patient c/.o No bowel movement for  more than 6 days , on miralax q12h, and senna , added dulcolax tab 2 tonight if no bm by am patient can take mag citrate . abdomen is soft , non tender .   History of Present Illness:  Reason for Admission: Acute Rehabilitation of L femoral neck fracture s/p percutaneous pinning on 8/14/23  History of Present Illness:   78F w/PMHx of RA on MTX, Hx of bladder CA s/p TURBT by Dr. 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  Patient reports was crouched and unloading her clothes washer when she tripped and fell over to her left side.  Patient experienced a large amount of pain and was unable to get up.  Patient was on floor approx 30m before she was found by a family member.  They were unable to get the patient up and activated EMS for transport to ED.  Patient with complaints of left hip pain, unable to straighten it or bear weight.  She also has a recent history of 'dizziness', for which she had an extensive outpatient w/u, and has improved after Vestibular Therapy.     Pt found to have a L femoral neck fracture and on 8/14/23 patient went to OR and underwent a closed reduction and percutaneous pinning without any complications.   Her course was complicated by significant elevations in her Transaminase levels, now resolving. She also developed Thrombocytopenia which is stable. Rehab of Fall s/p L femoral neck fracture s/p closed reduction and percutaneous pinning of left hip on 8/14.  -  In Rehab since today 8/18  to continu erehab program,pain meds , monitor labs , ekg     #Normocytic anemia; likely 2/2 postop blood loss.  stable       # Transaminitis; resolving  Will  monitor LFTs as pt is on percocet    #Thrombocytopenia, Hit panel  if platelets  decrease further.  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

## 2023-08-22 NOTE — PROGRESS NOTE ADULT - SUBJECTIVE AND OBJECTIVE BOX
Patient is a 78y old  Female who presents with a chief complaint of Acute Rehabilitation of L femoral neck fracture s/p percutaneous pinning on 8/14/23 (18 Aug 2023 12:42)      HPI:  78F w/PMHx of RA on MTX, Hx of bladder CA s/p TURBT by Dr. 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  Patient reports was crouched and unloading her clothes washer when she tripped and fell over to her left side.  Patient experienced a large amount of pain and was unable to get up.  Patient was on floor approx 30m before she was found by a family member.  They were unable to get the patient up and activated EMS for transport to ED.  Patient with complaints of left hip pain, unable to straighten it or bear weight.  She also has a recent history of 'dizziness', for which she had an extensive outpatient w/u, and has improved after Vestibular Therapy.     Pt found to have a L femoral neck fracture and on 8/14/23 patient went to OR and underwent a closed reduction and percutaneous pinning without any complications.   Her course was complicated by significant elevations in her Transaminase levels, now resolving. She also developed Thrombocytopenia which is stable.     Current level of function of patient prior to admission to rehab with PT on 8/18 reveals Katheryn bed mobility, CGA/ Katheryn transfers, ambulation CG WBAT LLE RW 40ft. With OT on 8/18/23, bathing Katheryn, supervision UBD, modA LBD, toileting modA, supervision grooming, supervision eating    The patient was evaluated by the PM&R team once medically stable. The patient was found to have functional limitation in terms of muscle strength, endurance, physical mobility, and ability to carry out activities of daily living (self care, transfers, and ambulation). The patient was started on a course of bedside therapy, the pt is motivated and able to start 3 hours of therapy  daily for 6-7 days a week. the patient was deemed to be a good candidate for admission for acute inpatient rehab. The patient was admitted to acute inpatient rehab on 8/18/23.   (18 Aug 2023 12:42)    TODAY'S SUBJECTIVE & REVIEW OF SYMPTOMS:  Pt seen and examined at bedside.  No overnight events.  No new complaints.    Reported some pain in left hip but it is not new.  Voiding urine/stool spontaneously.  Tolerating therapies well.    VS reviewed.      Constitutional:    [  x ] WNL           [   ] poor appetite   [   ] insomnia   [   ] tired   Cardio:                [ x  ] WNL           [   ] CP   [   ] LOUIS   [   ] palpitations               Resp:                   [x   ] WNL           [   ] SOB   [   ] cough   [   ] wheezing   GI:                        [   ] WNL           [ x  ] constipation   [   ] diarrhea   [   ] abdominal pain   [   ] nausea   [   ] emesis                                :                      [x   ] WNL           [   ] MCLEAN  [   ] dysuria   [   ] difficulty voiding             Endo:                   [x   ] WNL          [   ] polyuria   [   ] temperature intolerance                 Skin:                     [   ] WNL          [   ] pain   [x   ] wound, surgical site dry  [   ] rash   MSK:                    [   ] WNL          [x   ] muscle pain   [  x ] joint pain/ stiffness L hip  [   ] muscle tenderness     Neuro:                 [x   ] WNL          [   ] HA   [   ] change in vision   [   ] tremor   [   ] weakness   [   ]dysphagia              Cognitive:           [x   ] WNL           [   ]confusion      Psych:                  [x   ] WNL           [   ] hallucinations   [   ]agitation   [   ] delusion   [   ]depression      PHYSICAL EXAM    Vital Signs (24 Hrs):  T(C): 36.4 (08-22-23 @ 05:00), Max: 36.7 (08-21-23 @ 12:53)  HR: 66 (08-22-23 @ 05:00) (66 - 93)  BP: 135/62 (08-22-23 @ 05:00) (126/70 - 135/62)  RR: 18 (08-22-23 @ 05:00) (18 - 18)  SpO2: --  Wt(kg): --  Daily     Daily     I&O's Summary      General:[  x ] NAD, Resting Comfortable,   [   ] other:                                HEENT: [ x  ] NC/AT, EOMI, PERRL , Normal Conjunctivae,   [   ] other:  Cardio: [x  ] RRR, no murmur,   [   ] other:                              Pulm: [x   ] No Respiratory Distress,  Lungs CTAB,   [   ] other:                       Abdomen: [ x  ]ND/NT, Soft,   [   ] other:    : [ x  ] NO MCLEAN CATHETER, [   ] MCLEAN CATHETER- no meatal tear, no discharge, [   ] other:                                            MSK: [   ] No joint swelling, Full ROM,   [  x ] other: Mild boutonniere deformity present in RUE thumb.  reduced ROM of L hip and knee 2/2 to pain and recent surgery. otherwise all other extremities full ROM                                      Ext: [  x ]No C/C/E, No calf tenderness,   [   ]other:    Skin: [   ]intact,   [ x  ] other:    surgical site of L hip present                                                               Neurological Examination:  Cognitive: [   x ] AAO x 3,   [    ]  other:                                                                      Attention:  [  x  ] intact,   [    ]  other:                            Memory: [ x   ] intact,    [    ]  other:     Mood/Affect: [  x  ] wnl,    [    ]  other:                                                                             Communication: [ x   ]Fluent, no dysarthria, following commands:  [    ] other:   CN II - XII:  [ x   ] intact,  [    ] other:                                                                                        Motor:   RIGHT UE: [x   ] WNL,  [   ] other: 5/5 throughout  LEFT    UE: [x   ] WNL,  [   ] other: 5/5 throughout  RIGHT LE: [ x  ] WNL,  [   ] other: 5/5 throughout  LEFT    LE: [   ] WNL,  [  x ] other: 2+/5 HF, unable to assess KF/KE due to pain, 5/5 DF/PF    Tone: [ x   ] wnl,   [    ]  other:  DTRs: [ x  ]symmetric 3+, [   ] other:  Coordination:   [  x  ] intact,   [    ] other:  FTN intact BUE, unable to conduct HTS due to lack of ROM and pain. proprioception intact in BLE                                                                         Sensory: [  x  ] Intact to light touch and proprioception    MEDICATIONS  (STANDING):  chlorhexidine 2% Cloths 1 Application(s) Topical <User Schedule>  enoxaparin Injectable 40 milliGRAM(s) SubCutaneous every 24 hours  methotrexate 15 milliGRAM(s) Oral <User Schedule>  pantoprazole    Tablet 40 milliGRAM(s) Oral before breakfast  polyethylene glycol 3350 17 Gram(s) Oral two times a day  senna 2 Tablet(s) Oral daily    MEDICATIONS  (PRN):  aluminum hydroxide/magnesium hydroxide/simethicone Suspension 30 milliLiter(s) Oral every 4 hours PRN Dyspepsia  magnesium citrate Oral Solution 296 milliLiter(s) Oral once PRN Constipation  melatonin 3 milliGRAM(s) Oral at bedtime PRN Insomnia  oxycodone    5 mG/acetaminophen 325 mG 1 Tablet(s) Oral every 4 hours PRN Moderate Pain (4 - 6)      RECENT LABS/IMAGING                          12.5   7.31  )-----------( 168      ( 21 Aug 2023 07:20 )             37.8     08-21    142  |  107  |  22<H>  ----------------------------<  104<H>  4.2   |  22  |  0.9    Ca    9.3      21 Aug 2023 07:20  Mg     2.5     08-21    TPro  6.1  /  Alb  4.0  /  TBili  0.5  /  DBili  x   /  AST  38  /  ALT  54<H>  /  AlkPhos  99  08-21   Patient is a 78y old  Female who presents with a chief complaint of Acute Rehabilitation of L femoral neck fracture s/p percutaneous pinning on 8/14/23 (18 Aug 2023 12:42)      HPI:  78F w/PMHx of RA on MTX, Hx of bladder CA s/p TURBT by Dr. 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  Patient reports was crouched and unloading her clothes washer when she tripped and fell over to her left side.  Patient experienced a large amount of pain and was unable to get up.  Patient was on floor approx 30m before she was found by a family member.  They were unable to get the patient up and activated EMS for transport to ED.  Patient with complaints of left hip pain, unable to straighten it or bear weight.  She also has a recent history of 'dizziness', for which she had an extensive outpatient w/u, and has improved after Vestibular Therapy.     Pt found to have a L femoral neck fracture and on 8/14/23 patient went to OR and underwent a closed reduction and percutaneous pinning without any complications.   Her course was complicated by significant elevations in her Transaminase levels, now resolving. She also developed Thrombocytopenia which is stable.     Current level of function of patient prior to admission to rehab with PT on 8/18 reveals Katheryn bed mobility, CGA/ Katheryn transfers, ambulation CG WBAT LLE RW 40ft. With OT on 8/18/23, bathing Katheryn, supervision UBD, modA LBD, toileting modA, supervision grooming, supervision eating    The patient was evaluated by the PM&R team once medically stable. The patient was found to have functional limitation in terms of muscle strength, endurance, physical mobility, and ability to carry out activities of daily living (self care, transfers, and ambulation). The patient was started on a course of bedside therapy, the pt is motivated and able to start 3 hours of therapy  daily for 6-7 days a week. the patient was deemed to be a good candidate for admission for acute inpatient rehab. The patient was admitted to acute inpatient rehab on 8/18/23.   (18 Aug 2023 12:42)    TODAY'S SUBJECTIVE & REVIEW OF SYMPTOMS:  Pt seen and examined at bedside.  No overnight events.  No new complaints.    Reported some pain in left hip during therapy, but it is not new.  Voiding urine/stool spontaneously.  Tolerating therapies well.    VS reviewed.      Constitutional:    [  x ] WNL           [   ] poor appetite   [   ] insomnia   [   ] tired   Cardio:                [ x  ] WNL           [   ] CP   [   ] LOUIS   [   ] palpitations               Resp:                   [x   ] WNL           [   ] SOB   [   ] cough   [   ] wheezing   GI:                        [   ] WNL           [ x  ] constipation   [   ] diarrhea   [   ] abdominal pain   [   ] nausea   [   ] emesis                                :                      [x   ] WNL           [   ] MCLEAN  [   ] dysuria   [   ] difficulty voiding             Endo:                   [x   ] WNL          [   ] polyuria   [   ] temperature intolerance                 Skin:                     [   ] WNL          [   ] pain   [x   ] wound, surgical site dry  [   ] rash   MSK:                    [   ] WNL          [x   ] muscle pain   [  x ] joint pain/ stiffness L hip  [   ] muscle tenderness     Neuro:                 [x   ] WNL          [   ] HA   [   ] change in vision   [   ] tremor   [   ] weakness   [   ]dysphagia              Cognitive:           [x   ] WNL           [   ]confusion      Psych:                  [x   ] WNL           [   ] hallucinations   [   ]agitation   [   ] delusion   [   ]depression      PHYSICAL EXAM    Vital Signs (24 Hrs):  T(C): 36.4 (08-22-23 @ 05:00), Max: 36.7 (08-21-23 @ 12:53)  HR: 66 (08-22-23 @ 05:00) (66 - 93)  BP: 135/62 (08-22-23 @ 05:00) (126/70 - 135/62)  RR: 18 (08-22-23 @ 05:00) (18 - 18)        General:[  x ] NAD, Resting Comfortable,   [   ] other:                                HEENT: [ x  ] NC/AT, EOMI, PERRL , Normal Conjunctivae,   [   ] other:  Cardio: [x  ] RRR, no murmur,   [   ] other:                              Pulm: [x   ] No Respiratory Distress,  Lungs CTAB,   [   ] other:                       Abdomen: [ x  ]ND/NT, Soft,   [   ] other:    : [ x  ] NO MCLEAN CATHETER, [   ] MCLEAN CATHETER- no meatal tear, no discharge, [   ] other:                                            MSK: [   ] No joint swelling, Full ROM,   [  x ] other: Mild boutonniere deformity present in RUE thumb.  reduced ROM of L hip and knee 2/2 to pain and recent surgery. otherwise all other extremities full ROM                                      Ext: [  x ]No C/C/E, No calf tenderness,   [   ]other:    Skin: [   ]intact,   [ x  ] other:    surgical site of L hip present                                                               Neurological Examination:  Cognitive: [   x ] AAO x 3,   [    ]  other:                                                                      Attention:  [  x  ] intact,   [    ]  other:                            Memory: [ x   ] intact,    [    ]  other:     Mood/Affect: [  x  ] wnl,    [    ]  other:                                                                             Communication: [ x   ]Fluent, no dysarthria, following commands:  [    ] other:   CN II - XII:  [ x   ] intact,  [    ] other:                                                                                        Motor:   RIGHT UE: [x   ] WNL,  [   ] other: 5/5 throughout  LEFT    UE: [x   ] WNL,  [   ] other: 5/5 throughout  RIGHT LE: [ x  ] WNL,  [   ] other: 5/5 throughout  LEFT    LE: [   ] WNL,  [  x ] other: 2+/5 HF, unable to assess KF/KE due to pain, 5/5 DF/PF    Tone: [ x   ] wnl,   [    ]  other:  DTRs: [ x  ]symmetric 3+, [   ] other:  Coordination:   [  x  ] intact,   [    ] other:  FTN intact BUE, unable to conduct HTS due to lack of ROM and pain. proprioception intact in BLE                                                                         Sensory: [  x  ] Intact to light touch and proprioception    MEDICATIONS  (STANDING):  chlorhexidine 2% Cloths 1 Application(s) Topical <User Schedule>  enoxaparin Injectable 40 milliGRAM(s) SubCutaneous every 24 hours  methotrexate 15 milliGRAM(s) Oral <User Schedule>  pantoprazole    Tablet 40 milliGRAM(s) Oral before breakfast  polyethylene glycol 3350 17 Gram(s) Oral two times a day  senna 2 Tablet(s) Oral daily    MEDICATIONS  (PRN):  aluminum hydroxide/magnesium hydroxide/simethicone Suspension 30 milliLiter(s) Oral every 4 hours PRN Dyspepsia  magnesium citrate Oral Solution 296 milliLiter(s) Oral once PRN Constipation  melatonin 3 milliGRAM(s) Oral at bedtime PRN Insomnia  oxycodone    5 mG/acetaminophen 325 mG 1 Tablet(s) Oral every 4 hours PRN Moderate Pain (4 - 6)      RECENT LABS/IMAGING                          12.5   7.31  )-----------( 168      ( 21 Aug 2023 07:20 )             37.8     08-21    142  |  107  |  22<H>  ----------------------------<  104<H>  4.2   |  22  |  0.9    Ca    9.3      21 Aug 2023 07:20  Mg     2.5     08-21    TPro  6.1  /  Alb  4.0  /  TBili  0.5  /  DBili  x   /  AST  38  /  ALT  54<H>  /  AlkPhos  99  08-21    < from: Xray Hip 2-3 Views, Left (08.22.23 @ 13:06) >  The patient is again status post percutaneous screw fixation of a left   femoral neck fracture. The hardware is intact. Fracture alignment is not   appreciably changed. Skin staples are again present.    No acute fracture is seen. There is no dislocation. The patient is again   status post right hip hemiarthroplasty, without evidence of complication.    < end of copied text >

## 2023-08-22 NOTE — PROGRESS NOTE ADULT - ASSESSMENT
ASSESSMENT/PLAN  78F w/PMHx of RA on MTX, Hx of bladder CA s/p TURBT by Dr. 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  Patient reports was crouched and unloading her clothes washer when she tripped and fell over to her left side.  Pt with left femoral neck fracture s/p closed reduction and percutaneous pinning of left hip on 8/14 and admitted to acute rehab on 8/18.    #Rehab of Fall s/p L femoral neck fracture s/p closed reduction and percutaneous pinning of left hip on 8/14.  - Patient requires a full rehab program of 3 hours a day of PT, OT  Physiatry to address: Ambulatory dysfunction, Medical condition, ADLs  Physical therapy to address: Ambulation and transfers, balance and strength  Occupational therapy to address: ADLs and functional transfers  Social work to address: Home services and barriers to discharge  Case Management/Care coordination to address: Home services and barriers to discharge  Rehab Nursing to address: Individualized bedside care  Precautions/restrictions: Safety/Fall precautions  -orthopedics following  - completed post op prophylactic Ancef for 24 hours   - percocet 5-325 q4hr prn mod pain; wean off narcotics as appropriate  - Weight bearing: WBAT LLE with walker    #Normocytic anemia; likely 2/2 postop blood loss.  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  - c/w miralax, senna  - s/p bisacodyl,   - had BMs. Monitor    #Misc  -GI/Bowel Mgmt: as above, protonix 40mg qAM  - Melatonin 3md qhs   -Skin: monitor incision    - Diet: Regular diet        Precautions / PROPHYLAXIS:      - Falls, safety    - Ortho: Weight bearing status: WBAT LLE with walker      - DVT prophylaxis: SQH 5000u q8hr ; on d/c aspirin 81 mg BID   ASSESSMENT/PLAN  78F w/PMHx of RA on MTX, Hx of bladder CA s/p TURBT by Dr. 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  Patient reports was crouched and unloading her clothes washer when she tripped and fell over to her left side.  Pt with left femoral neck fracture s/p closed reduction and percutaneous pinning of left hip on 8/14 and admitted to acute rehab on 8/18.    #Rehab of Fall s/p L femoral neck fracture s/p closed reduction and percutaneous pinning of left hip on 8/14.  - Patient requires a full rehab program of 3 hours a day of PT, OT  -orthopedics following  - patient c/o ongoing left hip pain with ROM/ walking.  F/U x-ray with no displacement of pins  - Patient requesting Ibuprofen 600mg daily which she takes at home. Ordered.  - continue percocet 5-325 q4hr prn mod pain; wean off narcotics as appropriate  - Weight bearing: WBAT LLE with walker    #Normocytic anemia; likely 2/2 postop blood loss.  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  - c/w miralax, senna  - s/p bisacodyl,   - had BMs. Monitor on current meds    #Misc  -GI/Bowel Mgmt: as above, protonix 40mg qAM  - Melatonin 3md qhs   -Skin: monitor incision    - Diet: Regular diet        Precautions / PROPHYLAXIS:      - Falls, safety    - Ortho: Weight bearing status: WBAT LLE with walker      - DVT prophylaxis: SQH 5000u q8hr ; on d/c aspirin 81 mg BID

## 2023-08-23 RX ADMIN — CHLORHEXIDINE GLUCONATE 1 APPLICATION(S): 213 SOLUTION TOPICAL at 06:02

## 2023-08-23 RX ADMIN — OXYCODONE HYDROCHLORIDE 5 MILLIGRAM(S): 5 TABLET ORAL at 00:00

## 2023-08-23 RX ADMIN — Medication 600 MILLIGRAM(S): at 08:10

## 2023-08-23 RX ADMIN — ENOXAPARIN SODIUM 40 MILLIGRAM(S): 100 INJECTION SUBCUTANEOUS at 06:03

## 2023-08-23 RX ADMIN — Medication 1000 MILLIGRAM(S): at 21:03

## 2023-08-23 RX ADMIN — Medication 600 MILLIGRAM(S): at 09:00

## 2023-08-23 RX ADMIN — Medication 1000 MILLIGRAM(S): at 06:01

## 2023-08-23 RX ADMIN — Medication 1000 MILLIGRAM(S): at 22:03

## 2023-08-23 RX ADMIN — OXYCODONE HYDROCHLORIDE 5 MILLIGRAM(S): 5 TABLET ORAL at 09:00

## 2023-08-23 RX ADMIN — PANTOPRAZOLE SODIUM 40 MILLIGRAM(S): 20 TABLET, DELAYED RELEASE ORAL at 06:01

## 2023-08-23 RX ADMIN — OXYCODONE HYDROCHLORIDE 5 MILLIGRAM(S): 5 TABLET ORAL at 08:10

## 2023-08-23 RX ADMIN — Medication 1000 MILLIGRAM(S): at 13:02

## 2023-08-23 RX ADMIN — Medication 1000 MILLIGRAM(S): at 14:00

## 2023-08-23 NOTE — PROGRESS NOTE ADULT - ASSESSMENT
ASSESSMENT/PLAN  78F w/PMHx of RA on MTX, Hx of bladder CA s/p TURBT by Dr. 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  Patient reports was crouched and unloading her clothes washer when she tripped and fell over to her left side.  Pt with left femoral neck fracture s/p closed reduction and percutaneous pinning of left hip on 8/14 and admitted to acute rehab on 8/18.    #Rehab of Fall s/p L femoral neck fracture s/p closed reduction and percutaneous pinning of left hip on 8/14.  - Patient requires a full rehab program of 3 hours a day of PT, OT  -orthopedics following  - patient c/o ongoing left hip pain with ROM/ walking.  F/U x-ray with no displacement of pins  - Patient requesting Ibuprofen 600mg daily which she takes at home. Ordered.  - continue percocet 5-325 q4hr prn mod pain; wean off narcotics as appropriate  - Weight bearing: WBAT LLE with walker    #Normocytic anemia; likely 2/2 postop blood loss.  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  - c/w miralax, senna  - s/p bisacodyl  - had BMs. Monitor on current meds    #Misc  -GI/Bowel Mgmt: as above, protonix 40mg qAM  - Melatonin 3md qhs   -Skin: monitor incision    - Diet: Regular diet        Precautions / PROPHYLAXIS:      - Falls, safety    - Ortho: Weight bearing status: WBAT LLE with walker      - DVT prophylaxis: SQH 5000u q8hr ; on d/c aspirin 81 mg BID   ASSESSMENT/PLAN  78F w/PMHx of RA on MTX, Hx of bladder CA s/p TURBT by Dr. 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  Patient reports was crouched and unloading her clothes washer when she tripped and fell over to her left side.  Pt with left femoral neck fracture s/p closed reduction and percutaneous pinning of left hip on 8/14 and admitted to acute rehab on 8/18.    #Rehab of Fall s/p L femoral neck fracture s/p closed reduction and percutaneous pinning of left hip on 8/14.  - Patient requires a full rehab program of 3 hours a day of PT, OT  -orthopedics following  - patient c/o ongoing left hip/ groin pain with ROM/ walking.  F/U x-ray with no displacement of pins  - Patient requesting Ibuprofen 600mg daily which she takes at home. Ordered.  - continue Oxycone q4hr prn mod pain; wean off narcotics as appropriate  - Acetominophen changed to 975 mg q 8 hrs. standing  - Weight bearing: WBAT LLE with walker    #Normocytic anemia; likely 2/2 postop blood loss.  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  - c/w miralax, senna  - s/p bisacodyl  - had BMs. Monitor on current meds    #Misc  -GI/Bowel Mgmt: as above, protonix 40mg qAM  - Melatonin 3md qhs   -Skin: monitor incision    - Diet: Regular diet        Precautions / PROPHYLAXIS:      - Falls, safety    - Ortho: Weight bearing status: WBAT LLE with walker      - DVT prophylaxis: SQH 5000u q8hr ; on d/c aspirin 81 mg BID

## 2023-08-23 NOTE — PROGRESS NOTE ADULT - SUBJECTIVE AND OBJECTIVE BOX
Patient is a 78y old  Female who presents with a chief complaint of Acute Rehabilitation of L femoral neck fracture s/p percutaneous pinning on 8/14/23 (18 Aug 2023 12:42)      HPI:  78F w/PMHx of RA on MTX, Hx of bladder CA s/p TURBT by Dr. 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  Patient reports was crouched and unloading her clothes washer when she tripped and fell over to her left side.  Patient experienced a large amount of pain and was unable to get up.  Patient was on floor approx 30m before she was found by a family member.  They were unable to get the patient up and activated EMS for transport to ED.  Patient with complaints of left hip pain, unable to straighten it or bear weight.  She also has a recent history of 'dizziness', for which she had an extensive outpatient w/u, and has improved after Vestibular Therapy.     Pt found to have a L femoral neck fracture and on 8/14/23 patient went to OR and underwent a closed reduction and percutaneous pinning without any complications.   Her course was complicated by significant elevations in her Transaminase levels, now resolving. She also developed Thrombocytopenia which is stable.     Current level of function of patient prior to admission to rehab with PT on 8/18 reveals Katheryn bed mobility, CGA/ Katheryn transfers, ambulation CG WBAT LLE RW 40ft. With OT on 8/18/23, bathing Katheryn, supervision UBD, modA LBD, toileting modA, supervision grooming, supervision eating    The patient was evaluated by the PM&R team once medically stable. The patient was found to have functional limitation in terms of muscle strength, endurance, physical mobility, and ability to carry out activities of daily living (self care, transfers, and ambulation). The patient was started on a course of bedside therapy, the pt is motivated and able to start 3 hours of therapy  daily for 6-7 days a week. the patient was deemed to be a good candidate for admission for acute inpatient rehab. The patient was admitted to acute inpatient rehab on 8/18/23.   (18 Aug 2023 12:42)    TODAY'S SUBJECTIVE & REVIEW OF SYMPTOMS:  Pt seen and examined at bedside.  No overnight events.  No new complaints.  X-ray hip (8/22/2023) was stable.  Voiding urine/stool spontaneously.  Tolerating therapies well.    VS reviewed.    Current level of functioning  Touch assist with  bed mobility; touch assist with transfers; ambulates 75ft x2 using RW with touch assist.      Constitutional:    [  x ] WNL           [   ] poor appetite   [   ] insomnia   [   ] tired   Cardio:                [ x  ] WNL           [   ] CP   [   ] LOUIS   [   ] palpitations               Resp:                   [x   ] WNL           [   ] SOB   [   ] cough   [   ] wheezing   GI:                        [   ] WNL           [ x  ] constipation   [   ] diarrhea   [   ] abdominal pain   [   ] nausea   [   ] emesis                                :                      [x   ] WNL           [   ] MCLEAN  [   ] dysuria   [   ] difficulty voiding             Endo:                   [x   ] WNL          [   ] polyuria   [   ] temperature intolerance                 Skin:                     [   ] WNL          [   ] pain   [x   ] wound, surgical site dry  [   ] rash   MSK:                    [   ] WNL          [x   ] muscle pain   [  x ] joint pain/ stiffness L hip  [   ] muscle tenderness     Neuro:                 [x   ] WNL          [   ] HA   [   ] change in vision   [   ] tremor   [   ] weakness   [   ]dysphagia              Cognitive:           [x   ] WNL           [   ]confusion      Psych:                  [x   ] WNL           [   ] hallucinations   [   ]agitation   [   ] delusion   [   ]depression      PHYSICAL EXAM    Vital Signs (24 Hrs):  T(C): 36.7 (08-23-23 @ 05:19), Max: 36.7 (08-22-23 @ 12:43)  HR: 58 (08-23-23 @ 05:19) (58 - 83)  BP: 125/58 (08-23-23 @ 05:19) (125/58 - 137/64)  RR: 18 (08-23-23 @ 05:19) (18 - 18)  SpO2: --  Wt(kg): --  Daily     Daily     I&O's Summary        General:[  x ] NAD, Resting Comfortable,   [   ] other:                                HEENT: [ x  ] NC/AT, EOMI, PERRL , Normal Conjunctivae,   [   ] other:  Cardio: [x  ] RRR, no murmur,   [   ] other:                              Pulm: [x   ] No Respiratory Distress,  Lungs CTAB,   [   ] other:                       Abdomen: [ x  ]ND/NT, Soft,   [   ] other:    : [ x  ] NO MCLEAN CATHETER, [   ] MCLEAN CATHETER- no meatal tear, no discharge, [   ] other:                                            MSK: [   ] No joint swelling, Full ROM,   [  x ] other: Mild boutonniere deformity present in RUE thumb.  reduced ROM of L hip and knee 2/2 to pain and recent surgery. otherwise all other extremities full ROM                                      Ext: [  x ]No C/C/E, No calf tenderness,   [   ]other:    Skin: [   ]intact,   [ x  ] other:    surgical site of L hip present                                                               Neurological Examination:  Cognitive: [   x ] AAO x 3,   [    ]  other:                                                                      Attention:  [  x  ] intact,   [    ]  other:                            Memory: [ x   ] intact,    [    ]  other:     Mood/Affect: [  x  ] wnl,    [    ]  other:                                                                             Communication: [ x   ]Fluent, no dysarthria, following commands:  [    ] other:   CN II - XII:  [ x   ] intact,  [    ] other:                                                                                        Motor:   RIGHT UE: [x   ] WNL,  [   ] other: 5/5 throughout  LEFT    UE: [x   ] WNL,  [   ] other: 5/5 throughout  RIGHT LE: [ x  ] WNL,  [   ] other: 5/5 throughout  LEFT    LE: [   ] WNL,  [  x ] other: 2+/5 HF, unable to assess KF/KE due to pain, 5/5 DF/PF    Tone: [ x   ] wnl,   [    ]  other:  DTRs: [ x  ]symmetric 3+, [   ] other:  Coordination:   [  x  ] intact,   [    ] other:  FTN intact BUE, unable to conduct HTS due to lack of ROM and pain. proprioception intact in BLE                                                                         Sensory: [  x  ] Intact to light touch and proprioception    MEDICATIONS  (STANDING):  acetaminophen     Tablet .. 1000 milliGRAM(s) Oral every 8 hours  chlorhexidine 2% Cloths 1 Application(s) Topical <User Schedule>  enoxaparin Injectable 40 milliGRAM(s) SubCutaneous every 24 hours  ibuprofen  Tablet. 600 milliGRAM(s) Oral <User Schedule>  methotrexate 15 milliGRAM(s) Oral <User Schedule>  pantoprazole    Tablet 40 milliGRAM(s) Oral before breakfast  polyethylene glycol 3350 17 Gram(s) Oral two times a day  senna 2 Tablet(s) Oral daily    MEDICATIONS  (PRN):  aluminum hydroxide/magnesium hydroxide/simethicone Suspension 30 milliLiter(s) Oral every 4 hours PRN Dyspepsia  magnesium citrate Oral Solution 296 milliLiter(s) Oral once PRN Constipation  melatonin 3 milliGRAM(s) Oral at bedtime PRN Insomnia  oxyCODONE    IR 5 milliGRAM(s) Oral every 4 hours PRN moderate to severe pain (4 - 10/10)      RECENT LABS/IMAGING                          12.5   7.31  )-----------( 168      ( 21 Aug 2023 07:20 )             37.8     08-21    142  |  107  |  22<H>  ----------------------------<  104<H>  4.2   |  22  |  0.9    Ca    9.3      21 Aug 2023 07:20  Mg     2.5     08-21    TPro  6.1  /  Alb  4.0  /  TBili  0.5  /  DBili  x   /  AST  38  /  ALT  54<H>  /  AlkPhos  99  08-21    < from: Xray Hip 2-3 Views, Left (08.22.23 @ 13:06) >  The patient is again status post percutaneous screw fixation of a left   femoral neck fracture. The hardware is intact. Fracture alignment is not   appreciably changed. Skin staples are again present.    No acute fracture is seen. There is no dislocation. The patient is again   status post right hip hemiarthroplasty, without evidence of complication.    < end of copied text >     Patient is a 78y old  Female who presents with a chief complaint of Acute Rehabilitation of L femoral neck fracture s/p percutaneous pinning on 8/14/23 (18 Aug 2023 12:42)      HPI:  78F w/PMHx of RA on MTX, Hx of bladder CA s/p TURBT by Dr. 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  Patient reports was crouched and unloading her clothes washer when she tripped and fell over to her left side.  Patient experienced a large amount of pain and was unable to get up.  Patient was on floor approx 30m before she was found by a family member.  They were unable to get the patient up and activated EMS for transport to ED.  Patient with complaints of left hip pain, unable to straighten it or bear weight.  She also has a recent history of 'dizziness', for which she had an extensive outpatient w/u, and has improved after Vestibular Therapy.     Pt found to have a L femoral neck fracture and on 8/14/23 patient went to OR and underwent a closed reduction and percutaneous pinning without any complications.   Her course was complicated by significant elevations in her Transaminase levels, now resolving. She also developed Thrombocytopenia which is stable.     Current level of function of patient prior to admission to rehab with PT on 8/18 reveals Katheryn bed mobility, CGA/ Katheryn transfers, ambulation CG WBAT LLE RW 40ft. With OT on 8/18/23, bathing Katheryn, supervision UBD, modA LBD, toileting modA, supervision grooming, supervision eating    The patient was evaluated by the PM&R team once medically stable. The patient was found to have functional limitation in terms of muscle strength, endurance, physical mobility, and ability to carry out activities of daily living (self care, transfers, and ambulation). The patient was started on a course of bedside therapy, the pt is motivated and able to start 3 hours of therapy  daily for 6-7 days a week. the patient was deemed to be a good candidate for admission for acute inpatient rehab. The patient was admitted to acute inpatient rehab on 8/18/23.   (18 Aug 2023 12:42)    TODAY'S SUBJECTIVE & REVIEW OF SYMPTOMS:  Pt seen and examined at bedside.  No overnight events.  No new complaints. Ongoing left groin pain.  X-ray hip (8/22/2023) was stable.  Voiding urine/stool spontaneously.  Tolerating therapies well.     VS reviewed.    Current level of functioning  Touch assist with  bed mobility; touch assist with transfers; ambulates 75ft x2 using RW with touch assist.      Constitutional:    [  x ] WNL           [   ] poor appetite   [   ] insomnia   [   ] tired   Cardio:                [ x  ] WNL           [   ] CP   [   ] LOUIS   [   ] palpitations               Resp:                   [x   ] WNL           [   ] SOB   [   ] cough   [   ] wheezing   GI:                        [   ] WNL           [ x  ] constipation   [   ] diarrhea   [   ] abdominal pain   [   ] nausea   [   ] emesis                                :                      [x   ] WNL           [   ] MCLEAN  [   ] dysuria   [   ] difficulty voiding             Endo:                   [x   ] WNL          [   ] polyuria   [   ] temperature intolerance                 Skin:                     [   ] WNL          [   ] pain   [x   ] wound, surgical site dry  [   ] rash   MSK:                    [   ] WNL          [x   ] muscle pain   [  x ] joint pain/ stiffness L hip  [   ] muscle tenderness     Neuro:                 [x   ] WNL          [   ] HA   [   ] change in vision   [   ] tremor   [   ] weakness   [   ]dysphagia              Cognitive:           [x   ] WNL           [   ]confusion      Psych:                  [x   ] WNL           [   ] hallucinations   [   ]agitation   [   ] delusion   [   ]depression      PHYSICAL EXAM    Vital Signs (24 Hrs):  T(C): 36.7 (08-23-23 @ 05:19), Max: 36.7 (08-22-23 @ 12:43)  HR: 58 (08-23-23 @ 05:19) (58 - 83)  BP: 125/58 (08-23-23 @ 05:19) (125/58 - 137/64)  RR: 18 (08-23-23 @ 05:19) (18 - 18)    General:[  x ] NAD, Resting Comfortable,   [   ] other:                                HEENT: [ x  ] NC/AT, EOMI, PERRL , Normal Conjunctivae,   [   ] other:  Cardio: [x  ] RRR, no murmur,   [   ] other:                              Pulm: [x   ] No Respiratory Distress,  Lungs CTAB,   [   ] other:                       Abdomen: [ x  ]ND/NT, Soft,   [   ] other:    : [ x  ] NO MCLEAN CATHETER, [   ] MCLEAN CATHETER- no meatal tear, no discharge, [   ] other:                                            MSK: [   ] No joint swelling, Full ROM,   [  x ] other: Mild boutonniere deformity present in RUE thumb.  Reduced ROM of L hip and knee 2/2 to pain and recent surgery. otherwise all other extremities full ROM                                      Ext: [  x ]No C/C/E, No calf tenderness,   [   ]other:    Skin: [   ]intact,   [ x  ] other:    surgical site of L hip present                                                               Neurological Examination:  Cognitive: [   x ] AAO x 3,   [    ]  other:                                                                      Attention:  [  x  ] intact,   [    ]  other:                            Memory: [ x   ] intact,    [    ]  other:     Mood/Affect: [  x  ] wnl,    [    ]  other:                                                                             Communication: [ x   ]Fluent, no dysarthria, following commands:  [    ] other:   CN II - XII:  [ x   ] intact,  [    ] other:                                                                                        Motor:   RIGHT UE: [x   ] WNL,  [   ] other: 5/5 throughout  LEFT    UE: [x   ] WNL,  [   ] other: 5/5 throughout  RIGHT LE: [ x  ] WNL,  [   ] other: 5/5 throughout  LEFT    LE: [   ] WNL,  [  x ] other: 2+/5 HF, unable to assess KF/KE due to pain, 5/5 DF/PF    Tone: [ x   ] wnl,   [    ]  other:  DTRs: [ x  ]symmetric 3+, [   ] other:  Coordination:   [  x  ] intact,   [    ] other:  FTN intact BUE, unable to conduct HTS due to lack of ROM and pain. proprioception intact in BLE                                                                         Sensory: [  x  ] Intact to light touch and proprioception    MEDICATIONS  (STANDING):  acetaminophen     Tablet .. 1000 milliGRAM(s) Oral every 8 hours  chlorhexidine 2% Cloths 1 Application(s) Topical <User Schedule>  enoxaparin Injectable 40 milliGRAM(s) SubCutaneous every 24 hours  ibuprofen  Tablet. 600 milliGRAM(s) Oral <User Schedule>  methotrexate 15 milliGRAM(s) Oral <User Schedule>  pantoprazole    Tablet 40 milliGRAM(s) Oral before breakfast  polyethylene glycol 3350 17 Gram(s) Oral two times a day  senna 2 Tablet(s) Oral daily    MEDICATIONS  (PRN):  aluminum hydroxide/magnesium hydroxide/simethicone Suspension 30 milliLiter(s) Oral every 4 hours PRN Dyspepsia  magnesium citrate Oral Solution 296 milliLiter(s) Oral once PRN Constipation  melatonin 3 milliGRAM(s) Oral at bedtime PRN Insomnia  oxyCODONE    IR 5 milliGRAM(s) Oral every 4 hours PRN moderate to severe pain (4 - 10/10)      RECENT LABS/IMAGING                          12.5   7.31  )-----------( 168      ( 21 Aug 2023 07:20 )             37.8     08-21    142  |  107  |  22<H>  ----------------------------<  104<H>  4.2   |  22  |  0.9    Ca    9.3      21 Aug 2023 07:20  Mg     2.5     08-21    TPro  6.1  /  Alb  4.0  /  TBili  0.5  /  DBili  x   /  AST  38  /  ALT  54<H>  /  AlkPhos  99  08-21    < from: Xray Hip 2-3 Views, Left (08.22.23 @ 13:06) >  The patient is again status post percutaneous screw fixation of a left   femoral neck fracture. The hardware is intact. Fracture alignment is not   appreciably changed. Skin staples are again present.    No acute fracture is seen. There is no dislocation. The patient is again   status post right hip hemiarthroplasty, without evidence of complication.    < end of copied text >

## 2023-08-24 DIAGNOSIS — D69.6 THROMBOCYTOPENIA, UNSPECIFIED: ICD-10-CM
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
## 2023-08-24 DIAGNOSIS — K21.9 GASTRO-ESOPHAGEAL REFLUX DISEASE WITHOUT ESOPHAGITIS: ICD-10-CM

## 2023-08-24 DIAGNOSIS — R74.01 ELEVATION OF LEVELS OF LIVER TRANSAMINASE LEVELS: ICD-10-CM
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
## 2023-08-24 DIAGNOSIS — N18.31 CHRONIC KIDNEY DISEASE, STAGE 3A: ICD-10-CM

## 2023-08-24 DIAGNOSIS — Z98.41 CATARACT EXTRACTION STATUS, RIGHT EYE: ICD-10-CM

## 2023-08-24 DIAGNOSIS — I67.1 CEREBRAL ANEURYSM, NONRUPTURED: ICD-10-CM

## 2023-08-24 DIAGNOSIS — M85.80 OTHER SPECIFIED DISORDERS OF BONE DENSITY AND STRUCTURE, UNSPECIFIED SITE: ICD-10-CM

## 2023-08-24 DIAGNOSIS — Z98.42 CATARACT EXTRACTION STATUS, LEFT EYE: ICD-10-CM

## 2023-08-24 DIAGNOSIS — Z88.5 ALLERGY STATUS TO NARCOTIC AGENT: ICD-10-CM

## 2023-08-24 DIAGNOSIS — Z96.641 PRESENCE OF RIGHT ARTIFICIAL HIP JOINT: ICD-10-CM

## 2023-08-24 DIAGNOSIS — Z85.51 PERSONAL HISTORY OF MALIGNANT NEOPLASM OF BLADDER: ICD-10-CM

## 2023-08-24 DIAGNOSIS — Z90.49 ACQUIRED ABSENCE OF OTHER SPECIFIED PARTS OF DIGESTIVE TRACT: ICD-10-CM

## 2023-08-24 DIAGNOSIS — M06.9 RHEUMATOID ARTHRITIS, UNSPECIFIED: ICD-10-CM

## 2023-08-24 DIAGNOSIS — S72.002A FRACTURE OF UNSPECIFIED PART OF NECK OF LEFT FEMUR, INITIAL ENCOUNTER FOR CLOSED FRACTURE: ICD-10-CM

## 2023-08-24 DIAGNOSIS — N83.202 UNSPECIFIED OVARIAN CYST, LEFT SIDE: ICD-10-CM

## 2023-08-24 DIAGNOSIS — D84.821 IMMUNODEFICIENCY DUE TO DRUGS: ICD-10-CM

## 2023-08-24 RX ADMIN — OXYCODONE HYDROCHLORIDE 5 MILLIGRAM(S): 5 TABLET ORAL at 03:41

## 2023-08-24 RX ADMIN — Medication 600 MILLIGRAM(S): at 16:16

## 2023-08-24 RX ADMIN — OXYCODONE HYDROCHLORIDE 5 MILLIGRAM(S): 5 TABLET ORAL at 08:36

## 2023-08-24 RX ADMIN — METHOTREXATE 15 MILLIGRAM(S): 2.5 TABLET ORAL at 09:50

## 2023-08-24 RX ADMIN — Medication 1000 MILLIGRAM(S): at 17:07

## 2023-08-24 RX ADMIN — Medication 1000 MILLIGRAM(S): at 16:16

## 2023-08-24 RX ADMIN — PANTOPRAZOLE SODIUM 40 MILLIGRAM(S): 20 TABLET, DELAYED RELEASE ORAL at 06:16

## 2023-08-24 RX ADMIN — OXYCODONE HYDROCHLORIDE 5 MILLIGRAM(S): 5 TABLET ORAL at 14:25

## 2023-08-24 RX ADMIN — OXYCODONE HYDROCHLORIDE 5 MILLIGRAM(S): 5 TABLET ORAL at 09:08

## 2023-08-24 RX ADMIN — CHLORHEXIDINE GLUCONATE 1 APPLICATION(S): 213 SOLUTION TOPICAL at 05:09

## 2023-08-24 RX ADMIN — POLYETHYLENE GLYCOL 3350 17 GRAM(S): 17 POWDER, FOR SOLUTION ORAL at 05:10

## 2023-08-24 RX ADMIN — Medication 1000 MILLIGRAM(S): at 06:11

## 2023-08-24 RX ADMIN — ENOXAPARIN SODIUM 40 MILLIGRAM(S): 100 INJECTION SUBCUTANEOUS at 05:15

## 2023-08-24 RX ADMIN — Medication 1000 MILLIGRAM(S): at 05:11

## 2023-08-24 RX ADMIN — Medication 600 MILLIGRAM(S): at 17:07

## 2023-08-24 RX ADMIN — OXYCODONE HYDROCHLORIDE 5 MILLIGRAM(S): 5 TABLET ORAL at 02:41

## 2023-08-24 RX ADMIN — OXYCODONE HYDROCHLORIDE 5 MILLIGRAM(S): 5 TABLET ORAL at 15:09

## 2023-08-24 NOTE — PROGRESS NOTE ADULT - ASSESSMENT
ASSESSMENT/PLAN  78F w/PMHx of RA on MTX, Hx of bladder CA s/p TURBT by Dr. 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  Patient reports was crouched and unloading her clothes washer when she tripped and fell over to her left side.  Pt with left femoral neck fracture s/p closed reduction and percutaneous pinning of left hip on 8/14 and admitted to acute rehab on 8/18.    #Rehab of Fall s/p L femoral neck fracture s/p closed reduction and percutaneous pinning of left hip on 8/14.  - Patient requires a full rehab program of 3 hours a day of PT, OT  -orthopedics following  - patient c/o ongoing left hip/ groin pain with ROM/ walking.  F/U x-ray with no displacement of pins  - Patient requesting Ibuprofen 600mg daily which she takes at home. Ordered.  - continue Oxycone q4hr prn mod pain; wean off narcotics as appropriate  - Acetominophen changed to 975 mg q 8 hrs. standing  - Weight bearing: WBAT LLE with walker    #Normocytic anemia; likely 2/2 postop blood loss.  - improved    # Transaminitis; resolving  - possibly drug induced- normalized as of 8/17  - no abdominal pain on exam  - continue to monitor LFTs as pt is on percocet    #Thrombocytopenia  - resolved  - monitor    #Hx of Rheumatoid arthritis  The patient is in an immunosuppressed state as expected to be associated with methotrexate therapy   -Continue with home methotrexate 50mg q1week and folic acid    #Hx of CKD stage IIIa eGFR running 65-75  - Cr 0.9   - continue to monitor renal function    #5.5mm right posterior communicating artery aneurysm   - strict BP control, maintain SBP<160  - Pt planned to follow up [facility]RA and with management at [facility] after discharge    #Constipation (resolving)  Not associated with abdominal pain.  - c/w miralax, senna  - s/p bisacodyl  - had BMs. Monitor on current meds    #Misc  -GI/Bowel Mgmt: as above, protonix 40mg qAM  - Melatonin 3md qhs   -Skin: monitor incision    - Diet: Regular diet        Precautions / PROPHYLAXIS:      - Falls, safety    - Ortho: Weight bearing status: WBAT LLE with walker      - DVT prophylaxis: SQH 5000u q8hr ; on d/c aspirin 81 mg BID

## 2023-08-24 NOTE — PROGRESS NOTE ADULT - SUBJECTIVE AND OBJECTIVE BOX
Patient is a 78y old  Female who presents with a chief complaint of Acute Rehabilitation of L femoral neck fracture s/p percutaneous pinning on 8/14/23 (18 Aug 2023 12:42)      HPI:  78F w/PMHx of RA on MTX, Hx of bladder CA s/p TURBT by Dr. 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  Patient reports was crouched and unloading her clothes washer when she tripped and fell over to her left side.  Patient experienced a large amount of pain and was unable to get up.  Patient was on floor approx 30m before she was found by a family member.  They were unable to get the patient up and activated EMS for transport to ED.  Patient with complaints of left hip pain, unable to straighten it or bear weight.  She also has a recent history of 'dizziness', for which she had an extensive outpatient w/u, and has improved after Vestibular Therapy.     Pt found to have a L femoral neck fracture and on 8/14/23 patient went to OR and underwent a closed reduction and percutaneous pinning without any complications.   Her course was complicated by significant elevations in her Transaminase levels, now resolving. She also developed Thrombocytopenia which is stable.     Current level of function of patient prior to admission to rehab with PT on 8/18 reveals Katheryn bed mobility, CGA/ Katheryn transfers, ambulation CG WBAT LLE RW 40ft. With OT on 8/18/23, bathing Katheryn, supervision UBD, modA LBD, toileting modA, supervision grooming, supervision eating    The patient was evaluated by the PM&R team once medically stable. The patient was found to have functional limitation in terms of muscle strength, endurance, physical mobility, and ability to carry out activities of daily living (self care, transfers, and ambulation). The patient was started on a course of bedside therapy, the pt is motivated and able to start 3 hours of therapy  daily for 6-7 days a week. the patient was deemed to be a good candidate for admission for acute inpatient rehab. The patient was admitted to acute inpatient rehab on 8/18/23.   (18 Aug 2023 12:42)    TODAY'S SUBJECTIVE & REVIEW OF SYMPTOMS:  Pt seen and examined at bedside.  No overnight events.  No new complaints. Ongoing left groin pain.  X-ray hip (8/22/2023) was stable.  Voiding urine/stool spontaneously.  Tolerating therapies well.     VS reviewed.    Current level of functioning  Touch assist with  bed mobility; touch assist with transfers; ambulates 75ft x2 using RW with touch assist.      Constitutional:    [  x ] WNL           [   ] poor appetite   [   ] insomnia   [   ] tired   Cardio:                [ x  ] WNL           [   ] CP   [   ] LOUIS   [   ] palpitations               Resp:                   [x   ] WNL           [   ] SOB   [   ] cough   [   ] wheezing   GI:                        [   ] WNL           [ x  ] constipation   [   ] diarrhea   [   ] abdominal pain   [   ] nausea   [   ] emesis                                :                      [x   ] WNL           [   ] MCLEAN  [   ] dysuria   [   ] difficulty voiding             Endo:                   [x   ] WNL          [   ] polyuria   [   ] temperature intolerance                 Skin:                     [   ] WNL          [   ] pain   [x   ] wound, surgical site dry  [   ] rash   MSK:                    [   ] WNL          [x   ] muscle pain   [  x ] joint pain/ stiffness L hip  [   ] muscle tenderness     Neuro:                 [x   ] WNL          [   ] HA   [   ] change in vision   [   ] tremor   [   ] weakness   [   ]dysphagia              Cognitive:           [x   ] WNL           [   ]confusion      Psych:                  [x   ] WNL           [   ] hallucinations   [   ]agitation   [   ] delusion   [   ]depression      PHYSICAL EXAM    Vital Signs Last 24 Hrs  T(C): 36.7 (24 Aug 2023 12:33), Max: 36.7 (24 Aug 2023 05:08)  T(F): 98.1 (24 Aug 2023 12:33), Max: 98.1 (24 Aug 2023 12:33)  HR: 77 (24 Aug 2023 12:33) (70 - 77)  BP: 132/63 (24 Aug 2023 12:33) (129/61 - 141/64)  BP(mean): --  RR: 18 (24 Aug 2023 12:33) (18 - 18)  SpO2: --    General:[  x ] NAD, Resting Comfortable,   [   ] other:                                HEENT: [ x  ] NC/AT, EOMI, PERRL , Normal Conjunctivae,   [   ] other:  Cardio: [x  ] RRR, no murmur,   [   ] other:                              Pulm: [x   ] No Respiratory Distress,  Lungs CTAB,   [   ] other:                       Abdomen: [ x  ]ND/NT, Soft,   [   ] other:    : [ x  ] NO MCLEAN CATHETER, [   ] MCLEAN CATHETER- no meatal tear, no discharge, [   ] other:                                            MSK: [   ] No joint swelling, Full ROM,   [  x ] other: Mild boutonniere deformity present in RUE thumb.  Reduced ROM of L hip and knee 2/2 to pain and recent surgery. otherwise all other extremities full ROM                                      Ext: [  x ]No C/C/E, No calf tenderness,   [   ]other:    Skin: [   ]intact,   [ x  ] other:    surgical site of L hip dry                                                             Neurological Examination:  Cognitive: [   x ] AAO x 3,   [    ]  other:                                                                      Attention:  [  x  ] intact,   [    ]  other:                            Memory: [ x   ] intact,    [    ]  other:     Mood/Affect: [  x  ] wnl,    [    ]  other:                                                                             Communication: [ x   ]Fluent, no dysarthria, following commands:  [    ] other:   CN II - XII:  [ x   ] intact,  [    ] other:                                                                                        Motor:   RIGHT UE: [x   ] WNL,  [   ] other: 5/5 throughout  LEFT    UE: [x   ] WNL,  [   ] other: 5/5 throughout  RIGHT LE: [ x  ] WNL,  [   ] other: 5/5 throughout  LEFT    LE: [   ] WNL,  [  x ] other: 2+/5 HF, unable to assess KF/KE due to pain, 5/5 DF/PF    Tone: [ x   ] wnl,   [    ]  other:  DTRs: [ x  ]symmetric 3+, [   ] other:  Coordination:   [  x  ] intact,   [    ] other:  FTN intact BUE, unable to conduct HTS due to lack of ROM and pain. proprioception intact in BLE                                                                         Sensory: [  x  ] Intact to light touch and proprioception    MEDICATIONS  (STANDING):  acetaminophen     Tablet .. 1000 milliGRAM(s) Oral every 8 hours  chlorhexidine 2% Cloths 1 Application(s) Topical <User Schedule>  enoxaparin Injectable 40 milliGRAM(s) SubCutaneous every 24 hours  ibuprofen  Tablet. 600 milliGRAM(s) Oral <User Schedule>  methotrexate 15 milliGRAM(s) Oral <User Schedule>  pantoprazole    Tablet 40 milliGRAM(s) Oral before breakfast  polyethylene glycol 3350 17 Gram(s) Oral two times a day  senna 2 Tablet(s) Oral daily    MEDICATIONS  (PRN):  aluminum hydroxide/magnesium hydroxide/simethicone Suspension 30 milliLiter(s) Oral every 4 hours PRN Dyspepsia  magnesium citrate Oral Solution 296 milliLiter(s) Oral once PRN Constipation  melatonin 3 milliGRAM(s) Oral at bedtime PRN Insomnia  oxyCODONE    IR 5 milliGRAM(s) Oral every 4 hours PRN moderate to severe pain (4 - 10/10)      RECENT LABS/IMAGING                          12.5   7.31  )-----------( 168      ( 21 Aug 2023 07:20 )             37.8     08-21    142  |  107  |  22<H>  ----------------------------<  104<H>  4.2   |  22  |  0.9    Ca    9.3      21 Aug 2023 07:20  Mg     2.5     08-21    TPro  6.1  /  Alb  4.0  /  TBili  0.5  /  DBili  x   /  AST  38  /  ALT  54<H>  /  AlkPhos  99  08-21    < from: Xray Hip 2-3 Views, Left (08.22.23 @ 13:06) >  The patient is again status post percutaneous screw fixation of a left   femoral neck fracture. The hardware is intact. Fracture alignment is not   appreciably changed. Skin staples are again present.    No acute fracture is seen. There is no dislocation. The patient is again   status post right hip hemiarthroplasty, without evidence of complication.    < end of copied text >

## 2023-08-25 ENCOUNTER — TRANSCRIPTION ENCOUNTER (OUTPATIENT)
Age: 78
End: 2023-08-25

## 2023-08-25 RX ADMIN — Medication 1000 MILLIGRAM(S): at 06:30

## 2023-08-25 RX ADMIN — Medication 1000 MILLIGRAM(S): at 14:33

## 2023-08-25 RX ADMIN — OXYCODONE HYDROCHLORIDE 5 MILLIGRAM(S): 5 TABLET ORAL at 08:51

## 2023-08-25 RX ADMIN — Medication 600 MILLIGRAM(S): at 08:51

## 2023-08-25 RX ADMIN — ENOXAPARIN SODIUM 40 MILLIGRAM(S): 100 INJECTION SUBCUTANEOUS at 06:30

## 2023-08-25 RX ADMIN — Medication 600 MILLIGRAM(S): at 09:21

## 2023-08-25 RX ADMIN — Medication 1000 MILLIGRAM(S): at 14:03

## 2023-08-25 RX ADMIN — OXYCODONE HYDROCHLORIDE 5 MILLIGRAM(S): 5 TABLET ORAL at 09:21

## 2023-08-25 RX ADMIN — PANTOPRAZOLE SODIUM 40 MILLIGRAM(S): 20 TABLET, DELAYED RELEASE ORAL at 06:30

## 2023-08-25 RX ADMIN — Medication 1000 MILLIGRAM(S): at 20:46

## 2023-08-25 RX ADMIN — Medication 1 MILLIGRAM(S): at 08:51

## 2023-08-25 NOTE — DISCHARGE NOTE NURSING/CASE MANAGEMENT/SOCIAL WORK - NSDCPEFALRISK_GEN_ALL_CORE
For information on Fall & Injury Prevention, visit: [email or web address] OR  [email or web address] OR  https://www.cdc.gov/steadi/patient.html

## 2023-08-25 NOTE — PROGRESS NOTE ADULT - ASSESSMENT
ASSESSMENT/PLAN  78F w/PMHx of RA on MTX, Hx of bladder CA s/p TURBT by Dr. 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  Patient reports was crouched and unloading her clothes washer when she tripped and fell over to her left side.  Pt with left femoral neck fracture s/p closed reduction and percutaneous pinning of left hip on 8/14 and admitted to acute rehab on 8/18.    #Rehab of Fall s/p L femoral neck fracture s/p closed reduction and percutaneous pinning of left hip on 8/14.  - Patient requires a full rehab program of 3 hours a day of PT, OT  -orthopedics following  - patient c/o ongoing left hip/ groin pain with ROM/ walking.  F/U x-ray with no displacement of pins  - Patient requesting Ibuprofen 600mg daily which she takes at home. Ordered.  - continue Oxycone q4hr prn mod pain; wean off narcotics as appropriate  - Acetominophen changed to 975 mg q 8 hrs. standing  - Weight bearing: WBAT LLE with walker    #Normocytic anemia; likely 2/2 postop blood loss.  - improved    # Transaminitis; resolving  - possibly drug induced- normalized as of 8/17  - no abdominal pain on exam  - continue to monitor LFTs as pt is on percocet    #Thrombocytopenia  - resolved  - monitor    #Hx of Rheumatoid arthritis  The patient is in an immunosuppressed state as expected to be associated with methotrexate therapy   -Continue with home methotrexate 50mg q1week and folic acid    #Hx of CKD stage IIIa eGFR running 65-75  - Cr 0.9   - continue to monitor renal function    #5.5mm right posterior communicating artery aneurysm   - strict BP control, maintain SBP<160  - Pt planned to follow up [facility]RA and with management at [facility] after discharge  - avoid high BPs    #Constipation (resolving)  Not associated with abdominal pain.  - c/w miralax, senna  - s/p bisacodyl  - had BMs. Monitor on current meds    #Misc  -GI/Bowel Mgmt: as above, protonix 40mg qAM  - Melatonin 3md qhs   -Skin: monitor incision    - Diet: Regular diet        Precautions / PROPHYLAXIS:      - Falls, safety    - Ortho: Weight bearing status: WBAT LLE with walker      - DVT prophylaxis: SQH 5000u q8hr ; on d/c aspirin 81 mg BID

## 2023-08-25 NOTE — DISCHARGE NOTE NURSING/CASE MANAGEMENT/SOCIAL WORK - PATIENT PORTAL LINK FT
You can access the FollowMyHealth Patient Portal offered by [facility] by registering at the following website: [email or web address]. By joining [facility]’s FollowMyHealth portal, you will also be able to view your health information using other applications (apps) compatible with our system.

## 2023-08-25 NOTE — PROGRESS NOTE ADULT - SUBJECTIVE AND OBJECTIVE BOX
Patient is a 78y old  Female who presents with a chief complaint of Acute Rehabilitation of L femoral neck fracture s/p percutaneous pinning on 8/14/23 (18 Aug 2023 12:42)      HPI:  78F w/PMHx of RA on MTX, Hx of bladder CA s/p TURBT by Dr. 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  Patient reports was crouched and unloading her clothes washer when she tripped and fell over to her left side.  Patient experienced a large amount of pain and was unable to get up.  Patient was on floor approx 30m before she was found by a family member.  They were unable to get the patient up and activated EMS for transport to ED.  Patient with complaints of left hip pain, unable to straighten it or bear weight.  She also has a recent history of 'dizziness', for which she had an extensive outpatient w/u, and has improved after Vestibular Therapy.     Pt found to have a L femoral neck fracture and on 8/14/23 patient went to OR and underwent a closed reduction and percutaneous pinning without any complications.   Her course was complicated by significant elevations in her Transaminase levels, now resolving. She also developed Thrombocytopenia which is stable.     Current level of function of patient prior to admission to rehab with PT on 8/18 reveals Katheryn bed mobility, CGA/ Katheryn transfers, ambulation CG WBAT LLE RW 40ft. With OT on 8/18/23, bathing Katheryn, supervision UBD, modA LBD, toileting modA, supervision grooming, supervision eating    The patient was evaluated by the PM&R team once medically stable. The patient was found to have functional limitation in terms of muscle strength, endurance, physical mobility, and ability to carry out activities of daily living (self care, transfers, and ambulation). The patient was started on a course of bedside therapy, the pt is motivated and able to start 3 hours of therapy  daily for 6-7 days a week. the patient was deemed to be a good candidate for admission for acute inpatient rehab. The patient was admitted to acute inpatient rehab on 8/18/23.   (18 Aug 2023 12:42)    TODAY'S SUBJECTIVE & REVIEW OF SYMPTOMS:  Pt seen and examined at bedside. VSS  No overnight events.  No new complaints. Ongoing left groin pain.  Pain slowly improving. Tolerating therapies well.     VS reviewed.    Current level of functioning  Touch assist with  bed mobility; touch assist with transfers; ambulates 75ft x2 using RW with touch assist.      Constitutional:    [  x ] WNL           [   ] poor appetite   [   ] insomnia   [   ] tired   Cardio:                [ x  ] WNL           [   ] CP   [   ] LOUIS   [   ] palpitations               Resp:                   [x   ] WNL           [   ] SOB   [   ] cough   [   ] wheezing   GI:                        [   ] WNL           [ x  ] constipation   [   ] diarrhea   [   ] abdominal pain   [   ] nausea   [   ] emesis                                :                      [x   ] WNL           [   ] MCLEAN  [   ] dysuria   [   ] difficulty voiding             Endo:                   [x   ] WNL          [   ] polyuria   [   ] temperature intolerance                 Skin:                     [   ] WNL          [   ] pain   [x   ] wound, surgical site dry  [   ] rash   MSK:                    [   ] WNL          [x   ] muscle pain   [  x ] joint pain/ stiffness L hip  [   ] muscle tenderness     Neuro:                 [x   ] WNL          [   ] HA   [   ] change in vision   [   ] tremor   [   ] weakness   [   ]dysphagia              Cognitive:           [x   ] WNL           [   ]confusion      Psych:                  [x   ] WNL           [   ] hallucinations   [   ]agitation   [   ] delusion   [   ]depression      PHYSICAL EXAM    Vital Signs Last 24 Hrs  T(C): 36.3 (25 Aug 2023 14:39), Max: 36.6 (24 Aug 2023 21:50)  T(F): 97.4 (25 Aug 2023 14:39), Max: 97.8 (24 Aug 2023 21:50)  HR: 73 (25 Aug 2023 14:39) (66 - 73)  BP: 131/59 (25 Aug 2023 14:39) (125/58 - 137/68)  BP(mean): 83 (24 Aug 2023 21:50) (83 - 83)  RR: 20 (25 Aug 2023 14:39) (18 - 20)  SpO2: --    General:[  x ] NAD, Resting Comfortable,   [   ] other:                                HEENT: [ x  ] NC/AT, EOMI, PERRL , Normal Conjunctivae,   [   ] other:  Cardio: [x  ] RRR, no murmur,   [   ] other:                              Pulm: [x   ] No Respiratory Distress,  Lungs CTAB,   [   ] other:                       Abdomen: [ x  ]ND/NT, Soft,   [   ] other:    : [ x  ] NO MCLEAN CATHETER, [   ] MCLEAN CATHETER- no meatal tear, no discharge, [   ] other:                                            MSK: [   ] No joint swelling, Full ROM,   [  x ] other: Mild boutonniere deformity present in RUE thumb.  Reduced ROM of L hip and knee 2/2 to pain and recent surgery. otherwise all other extremities full ROM                                      Ext: [  x ]No C/C/E, No calf tenderness,   [   ]other:    Skin: [   ]intact,   [ x  ] other:    surgical site of L hip dry                                                             Neurological Examination:  Cognitive: [   x ] AAO x 3,   [    ]  other:                                                                      Attention:  [  x  ] intact,   [    ]  other:                            Memory: [ x   ] intact,    [    ]  other:     Mood/Affect: [  x  ] wnl,    [    ]  other:                                                                             Communication: [ x   ]Fluent, no dysarthria, following commands:  [    ] other:   CN II - XII:  [ x   ] intact,  [    ] other:                                                                                        Motor:   RIGHT UE: [x   ] WNL,  [   ] other: 5/5 throughout  LEFT    UE: [x   ] WNL,  [   ] other: 5/5 throughout  RIGHT LE: [ x  ] WNL,  [   ] other: 5/5 throughout  LEFT    LE: [   ] WNL,  [  x ] other: 2+/5 HF, unable to assess KF/KE due to pain, 5/5 DF/PF    Tone: [ x   ] wnl,   [    ]  other:  DTRs: [ x  ]symmetric 3+, [   ] other:  Coordination:   [  x  ] intact,   [    ] other:  FTN intact BUE, unable to conduct HTS due to lack of ROM and pain. proprioception intact in BLE                                                                         Sensory: [  x  ] Intact to light touch and proprioception    MEDICATIONS  (STANDING):  acetaminophen     Tablet .. 1000 milliGRAM(s) Oral every 8 hours  chlorhexidine 2% Cloths 1 Application(s) Topical <User Schedule>  enoxaparin Injectable 40 milliGRAM(s) SubCutaneous every 24 hours  ibuprofen  Tablet. 600 milliGRAM(s) Oral <User Schedule>  methotrexate 15 milliGRAM(s) Oral <User Schedule>  pantoprazole    Tablet 40 milliGRAM(s) Oral before breakfast  polyethylene glycol 3350 17 Gram(s) Oral two times a day  senna 2 Tablet(s) Oral daily    MEDICATIONS  (PRN):  aluminum hydroxide/magnesium hydroxide/simethicone Suspension 30 milliLiter(s) Oral every 4 hours PRN Dyspepsia  magnesium citrate Oral Solution 296 milliLiter(s) Oral once PRN Constipation  melatonin 3 milliGRAM(s) Oral at bedtime PRN Insomnia  oxyCODONE    IR 5 milliGRAM(s) Oral every 4 hours PRN moderate to severe pain (4 - 10/10)      RECENT LABS/IMAGING                          12.5   7.31  )-----------( 168      ( 21 Aug 2023 07:20 )             37.8     08-21    142  |  107  |  22<H>  ----------------------------<  104<H>  4.2   |  22  |  0.9    Ca    9.3      21 Aug 2023 07:20  Mg     2.5     08-21    TPro  6.1  /  Alb  4.0  /  TBili  0.5  /  DBili  x   /  AST  38  /  ALT  54<H>  /  AlkPhos  99  08-21    < from: Xray Hip 2-3 Views, Left (08.22.23 @ 13:06) >  The patient is again status post percutaneous screw fixation of a left   femoral neck fracture. The hardware is intact. Fracture alignment is not   appreciably changed. Skin staples are again present.    No acute fracture is seen. There is no dislocation. The patient is again   status post right hip hemiarthroplasty, without evidence of complication.    < end of copied text >

## 2023-08-26 RX ADMIN — OXYCODONE HYDROCHLORIDE 5 MILLIGRAM(S): 5 TABLET ORAL at 17:29

## 2023-08-26 RX ADMIN — Medication 1000 MILLIGRAM(S): at 13:51

## 2023-08-26 RX ADMIN — Medication 1000 MILLIGRAM(S): at 21:53

## 2023-08-26 RX ADMIN — Medication 1000 MILLIGRAM(S): at 06:17

## 2023-08-26 RX ADMIN — Medication 1000 MILLIGRAM(S): at 13:23

## 2023-08-26 RX ADMIN — ENOXAPARIN SODIUM 40 MILLIGRAM(S): 100 INJECTION SUBCUTANEOUS at 06:20

## 2023-08-26 RX ADMIN — OXYCODONE HYDROCHLORIDE 5 MILLIGRAM(S): 5 TABLET ORAL at 17:57

## 2023-08-26 RX ADMIN — Medication 1000 MILLIGRAM(S): at 21:52

## 2023-08-26 RX ADMIN — Medication 600 MILLIGRAM(S): at 09:01

## 2023-08-26 RX ADMIN — PANTOPRAZOLE SODIUM 40 MILLIGRAM(S): 20 TABLET, DELAYED RELEASE ORAL at 06:17

## 2023-08-26 RX ADMIN — Medication 1 MILLIGRAM(S): at 08:31

## 2023-08-26 RX ADMIN — CHLORHEXIDINE GLUCONATE 1 APPLICATION(S): 213 SOLUTION TOPICAL at 06:17

## 2023-08-26 RX ADMIN — Medication 600 MILLIGRAM(S): at 08:31

## 2023-08-26 NOTE — PROGRESS NOTE ADULT - ASSESSMENT
ASSESSMENT/PLAN  78F w/PMHx of RA on MTX, Hx of bladder CA s/p TURBT by Dr. 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  Patient reports was crouched and unloading her clothes washer when she tripped and fell over to her left side.  Pt with left femoral neck fracture s/p closed reduction and percutaneous pinning of left hip on 8/14 and admitted to acute rehab on 8/18.    #Rehab of Fall s/p L femoral neck fracture s/p closed reduction and percutaneous pinning of left hip on 8/14.  - Patient requires a full rehab program of 3 hours a day of PT, OT  -orthopedics following  - patient c/o ongoing left hip/ groin pain with ROM/ walking.  F/U x-ray with no displacement of pins  - Patient requesting Ibuprofen 600mg daily which she takes at home. Ordered.  - continue Oxycodone q4hr prn mod pain; wean off narcotics as appropriate  - Acetominophen changed to 975 mg q 8 hrs. standing  - Weight bearing: WBAT LLE with walker    #Normocytic anemia; likely 2/2 postop blood loss.  - improved    # Transaminitis; resolving  - possibly drug induced- normalized as of 8/17  - no abdominal pain on exam  - continue to monitor LFTs as pt is on percocet    #Thrombocytopenia  - resolved  - monitor    #Hx of Rheumatoid arthritis  The patient is in an immunosuppressed state as expected to be associated with methotrexate therapy   -Continue with home methotrexate 50mg q1week and folic acid    #Hx of CKD stage IIIa eGFR running 65-75  - Cr 0.9   - continue to monitor renal function    #5.5mm right posterior communicating artery aneurysm   - strict BP control, maintain SBP<160  - Pt planned to follow up [facility]RA and with management at [facility] after discharge  - avoid high BPs    #Constipation (resolving)  Not associated with abdominal pain.  - c/w miralax, senna  - s/p bisacodyl  - had BMs. Monitor on current meds    #Misc  -GI/Bowel Mgmt: as above, protonix 40mg qAM  - Melatonin 3md qhs   -Skin: monitor incision    - Diet: Regular diet        Precautions / PROPHYLAXIS:      - Falls, safety    - Ortho: Weight bearing status: WBAT LLE with walker      - DVT prophylaxis: SQH 5000u q8hr ; on d/c aspirin 81 mg BID

## 2023-08-26 NOTE — PROGRESS NOTE ADULT - SUBJECTIVE AND OBJECTIVE BOX
Patient is a 78y old  Female who presents with a chief complaint of Acute Rehabilitation of L femoral neck fracture s/p percutaneous pinning on 8/14/23 (18 Aug 2023 12:42)      HPI:  78F w/PMHx of RA on MTX, Hx of bladder CA s/p TURBT by Dr. 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  Patient reports was crouched and unloading her clothes washer when she tripped and fell over to her left side.  Patient experienced a large amount of pain and was unable to get up.  Patient was on floor approx 30m before she was found by a family member.  They were unable to get the patient up and activated EMS for transport to ED.  Patient with complaints of left hip pain, unable to straighten it or bear weight.  She also has a recent history of 'dizziness', for which she had an extensive outpatient w/u, and has improved after Vestibular Therapy.     Pt found to have a L femoral neck fracture and on 8/14/23 patient went to OR and underwent a closed reduction and percutaneous pinning without any complications.   Her course was complicated by significant elevations in her Transaminase levels, now resolving. She also developed Thrombocytopenia which is stable.     Current level of function of patient prior to admission to rehab with PT on 8/18 reveals Katheryn bed mobility, CGA/ Katheryn transfers, ambulation CG WBAT LLE RW 40ft. With OT on 8/18/23, bathing Katheryn, supervision UBD, modA LBD, toileting modA, supervision grooming, supervision eating    The patient was evaluated by the PM&R team once medically stable. The patient was found to have functional limitation in terms of muscle strength, endurance, physical mobility, and ability to carry out activities of daily living (self care, transfers, and ambulation). The patient was started on a course of bedside therapy, the pt is motivated and able to start 3 hours of therapy  daily for 6-7 days a week. the patient was deemed to be a good candidate for admission for acute inpatient rehab. The patient was admitted to acute inpatient rehab on 8/18/23.   (18 Aug 2023 12:42)    TODAY'S SUBJECTIVE & REVIEW OF SYMPTOMS:  Pt seen and examined at bedside. VSS  No overnight events.  No new complaints. Ongoing left groin pain.  Pain slowly improving. Tolerating therapies well.     VS reviewed.    Current level of functioning  Touch assist with  bed mobility; touch assist with transfers; ambulates 75ft x2 using RW with touch assist.      Constitutional:    [  x ] WNL           [   ] poor appetite   [   ] insomnia   [   ] tired   Cardio:                [ x  ] WNL           [   ] CP   [   ] LOUIS   [   ] palpitations               Resp:                   [x   ] WNL           [   ] SOB   [   ] cough   [   ] wheezing   GI:                        [   ] WNL           [ x  ] constipation   [   ] diarrhea   [   ] abdominal pain   [   ] nausea   [   ] emesis                                :                      [x   ] WNL           [   ] MCLEAN  [   ] dysuria   [   ] difficulty voiding             Endo:                   [x   ] WNL          [   ] polyuria   [   ] temperature intolerance                 Skin:                     [   ] WNL          [   ] pain   [x   ] wound, surgical site dry  [   ] rash   MSK:                    [   ] WNL          [x   ] muscle pain   [  x ] joint pain/ stiffness L hip  [   ] muscle tenderness     Neuro:                 [x   ] WNL          [   ] HA   [   ] change in vision   [   ] tremor   [   ] weakness   [   ]dysphagia              Cognitive:           [x   ] WNL           [   ]confusion      Psych:                  [x   ] WNL           [   ] hallucinations   [   ]agitation   [   ] delusion   [   ]depression      PHYSICAL EXAM    Vital Signs (24 Hrs):  T(C): 36.2 (08-26-23 @ 04:33), Max: 36.7 (08-25-23 @ 21:51)  HR: 68 (08-26-23 @ 04:33) (66 - 73)  BP: 129/62 (08-26-23 @ 04:33) (129/62 - 158/72)  RR: 18 (08-26-23 @ 04:33) (18 - 20)  SpO2: --  Wt(kg): --  Daily     Daily     I&O's Summary      General:[  x ] NAD, Resting Comfortable,   [   ] other:                                HEENT: [ x  ] NC/AT, EOMI, PERRL , Normal Conjunctivae,   [   ] other:  Cardio: [x  ] RRR, no murmur,   [   ] other:                              Pulm: [x   ] No Respiratory Distress,  Lungs CTAB,   [   ] other:                       Abdomen: [ x  ]ND/NT, Soft,   [   ] other:    : [ x  ] NO MCLEAN CATHETER, [   ] MCLEAN CATHETER- no meatal tear, no discharge, [   ] other:                                            MSK: [   ] No joint swelling, Full ROM,   [  x ] other: Mild boutonniere deformity present in RUE thumb.  Reduced ROM of L hip and knee 2/2 to pain and recent surgery. otherwise all other extremities full ROM                                      Ext: [  x ]No C/C/E, No calf tenderness,   [   ]other:    Skin: [   ]intact,   [ x  ] other:    surgical site of L hip dry                                                             Neurological Examination:  Cognitive: [   x ] AAO x 3,   [    ]  other:                                                                      Attention:  [  x  ] intact,   [    ]  other:                            Memory: [ x   ] intact,    [    ]  other:     Mood/Affect: [  x  ] wnl,    [    ]  other:                                                                             Communication: [ x   ]Fluent, no dysarthria, following commands:  [    ] other:   CN II - XII:  [ x   ] intact,  [    ] other:                                                                                        Motor:   RIGHT UE: [x   ] WNL,  [   ] other: 5/5 throughout  LEFT    UE: [x   ] WNL,  [   ] other: 5/5 throughout  RIGHT LE: [ x  ] WNL,  [   ] other: 5/5 throughout  LEFT    LE: [   ] WNL,  [  x ] other: 2+/5 HF, unable to assess KF/KE due to pain, 5/5 DF/PF    Tone: [ x   ] wnl,   [    ]  other:  DTRs: [ x  ]symmetric 3+, [   ] other:  Coordination:   [  x  ] intact,   [    ] other:  FTN intact BUE, unable to conduct HTS due to lack of ROM and pain. proprioception intact in BLE                                                                         Sensory: [  x  ] Intact to light touch and proprioception    MEDICATIONS  (STANDING):  acetaminophen     Tablet .. 1000 milliGRAM(s) Oral every 8 hours  chlorhexidine 2% Cloths 1 Application(s) Topical <User Schedule>  enoxaparin Injectable 40 milliGRAM(s) SubCutaneous every 24 hours  ibuprofen  Tablet. 600 milliGRAM(s) Oral <User Schedule>  methotrexate 15 milliGRAM(s) Oral <User Schedule>  pantoprazole    Tablet 40 milliGRAM(s) Oral before breakfast  polyethylene glycol 3350 17 Gram(s) Oral two times a day  senna 2 Tablet(s) Oral daily    MEDICATIONS  (PRN):  aluminum hydroxide/magnesium hydroxide/simethicone Suspension 30 milliLiter(s) Oral every 4 hours PRN Dyspepsia  magnesium citrate Oral Solution 296 milliLiter(s) Oral once PRN Constipation  melatonin 3 milliGRAM(s) Oral at bedtime PRN Insomnia  oxyCODONE    IR 5 milliGRAM(s) Oral every 4 hours PRN moderate to severe pain (4 - 10/10)      RECENT LABS/IMAGING                          12.5   7.31  )-----------( 168      ( 21 Aug 2023 07:20 )             37.8     08-21    142  |  107  |  22<H>  ----------------------------<  104<H>  4.2   |  22  |  0.9    Ca    9.3      21 Aug 2023 07:20  Mg     2.5     08-21    TPro  6.1  /  Alb  4.0  /  TBili  0.5  /  DBili  x   /  AST  38  /  ALT  54<H>  /  AlkPhos  99  08-21    < from: Xray Hip 2-3 Views, Left (08.22.23 @ 13:06) >  The patient is again status post percutaneous screw fixation of a left   femoral neck fracture. The hardware is intact. Fracture alignment is not   appreciably changed. Skin staples are again present.    No acute fracture is seen. There is no dislocation. The patient is again   status post right hip hemiarthroplasty, without evidence of complication.    < end of copied text >

## 2023-08-27 RX ADMIN — Medication 1000 MILLIGRAM(S): at 07:34

## 2023-08-27 RX ADMIN — Medication 1000 MILLIGRAM(S): at 08:20

## 2023-08-27 RX ADMIN — Medication 1000 MILLIGRAM(S): at 21:44

## 2023-08-27 RX ADMIN — Medication 1000 MILLIGRAM(S): at 13:32

## 2023-08-27 RX ADMIN — CHLORHEXIDINE GLUCONATE 1 APPLICATION(S): 213 SOLUTION TOPICAL at 07:34

## 2023-08-27 RX ADMIN — Medication 600 MILLIGRAM(S): at 08:20

## 2023-08-27 RX ADMIN — Medication 600 MILLIGRAM(S): at 07:34

## 2023-08-27 RX ADMIN — ENOXAPARIN SODIUM 40 MILLIGRAM(S): 100 INJECTION SUBCUTANEOUS at 07:35

## 2023-08-27 RX ADMIN — PANTOPRAZOLE SODIUM 40 MILLIGRAM(S): 20 TABLET, DELAYED RELEASE ORAL at 07:34

## 2023-08-27 RX ADMIN — Medication 1000 MILLIGRAM(S): at 14:10

## 2023-08-27 RX ADMIN — Medication 1 MILLIGRAM(S): at 07:35

## 2023-08-27 RX ADMIN — OXYCODONE HYDROCHLORIDE 5 MILLIGRAM(S): 5 TABLET ORAL at 08:26

## 2023-08-27 NOTE — PROGRESS NOTE ADULT - SUBJECTIVE AND OBJECTIVE BOX
Patient is a 78y old  Female who presents with a chief complaint of Acute Rehabilitation of L femoral neck fracture s/p percutaneous pinning on 8/14/23 (18 Aug 2023 12:42)      HPI:  78F w/PMHx of RA on MTX, Hx of bladder CA s/p TURBT by Dr. 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  Patient reports was crouched and unloading her clothes washer when she tripped and fell over to her left side.  Patient experienced a large amount of pain and was unable to get up.  Patient was on floor approx 30m before she was found by a family member.  They were unable to get the patient up and activated EMS for transport to ED.  Patient with complaints of left hip pain, unable to straighten it or bear weight.  She also has a recent history of 'dizziness', for which she had an extensive outpatient w/u, and has improved after Vestibular Therapy.     Pt found to have a L femoral neck fracture and on 8/14/23 patient went to OR and underwent a closed reduction and percutaneous pinning without any complications.   Her course was complicated by significant elevations in her Transaminase levels, now resolving. She also developed Thrombocytopenia which is stable.     Current level of function of patient prior to admission to rehab with PT on 8/18 reveals Katheryn bed mobility, CGA/ Katheryn transfers, ambulation CG WBAT LLE RW 40ft. With OT on 8/18/23, bathing Katheryn, supervision UBD, modA LBD, toileting modA, supervision grooming, supervision eating    The patient was evaluated by the PM&R team once medically stable. The patient was found to have functional limitation in terms of muscle strength, endurance, physical mobility, and ability to carry out activities of daily living (self care, transfers, and ambulation). The patient was started on a course of bedside therapy, the pt is motivated and able to start 3 hours of therapy  daily for 6-7 days a week. the patient was deemed to be a good candidate for admission for acute inpatient rehab. The patient was admitted to acute inpatient rehab on 8/18/23.   (18 Aug 2023 12:42)    TODAY'S SUBJECTIVE & REVIEW OF SYMPTOMS:  Pt seen and examined at bedside.  No overnight events.  No new complaints.  Left hip pain stable.    Voiding urine/stool spontaneously.    Tolerating therapies well.   VS reviewed.    Mentioned she would prefer getting discharged on 8/30/2023 instead of 9/1/2023.    Current level of functioning  Touch assist with  bed mobility; touch assist with transfers; ambulates 75ft x2 using RW with touch assist.      Constitutional:    [  x ] WNL           [   ] poor appetite   [   ] insomnia   [   ] tired   Cardio:                [ x  ] WNL           [   ] CP   [   ] LOUIS   [   ] palpitations               Resp:                   [x   ] WNL           [   ] SOB   [   ] cough   [   ] wheezing   GI:                        [   ] WNL           [ x  ] constipation   [   ] diarrhea   [   ] abdominal pain   [   ] nausea   [   ] emesis                                :                      [x   ] WNL           [   ] MCLEAN  [   ] dysuria   [   ] difficulty voiding             Endo:                   [x   ] WNL          [   ] polyuria   [   ] temperature intolerance                 Skin:                     [   ] WNL          [   ] pain   [x   ] wound, surgical site dry  [   ] rash   MSK:                    [   ] WNL          [x   ] muscle pain   [  x ] joint pain/ stiffness L hip  [   ] muscle tenderness     Neuro:                 [x   ] WNL          [   ] HA   [   ] change in vision   [   ] tremor   [   ] weakness   [   ]dysphagia              Cognitive:           [x   ] WNL           [   ]confusion      Psych:                  [x   ] WNL           [   ] hallucinations   [   ]agitation   [   ] delusion   [   ]depression      PHYSICAL EXAM  Vital Signs (24 Hrs):  T(C): 36.3 (08-27-23 @ 05:23), Max: 36.5 (08-26-23 @ 19:32)  HR: 61 (08-27-23 @ 05:23) (61 - 73)  BP: 137/61 (08-27-23 @ 05:23) (120/61 - 137/63)  RR: 19 (08-27-23 @ 05:23) (18 - 19)  SpO2: --  Wt(kg): --  Daily     Daily     I&O's Summary      General:[  x ] NAD, Resting Comfortable,   [   ] other:                                HEENT: [ x  ] NC/AT, EOMI, PERRL , Normal Conjunctivae,   [   ] other:  Cardio: [x  ] RRR, no murmur,   [   ] other:                              Pulm: [x   ] No Respiratory Distress,  Lungs CTAB,   [   ] other:                       Abdomen: [ x  ]ND/NT, Soft,   [   ] other:    : [ x  ] NO MCLEAN CATHETER, [   ] MCLEAN CATHETER- no meatal tear, no discharge, [   ] other:                                            MSK: [   ] No joint swelling, Full ROM,   [  x ] other: Mild boutonniere deformity present in RUE thumb.  Reduced ROM of L hip and knee 2/2 to pain and recent surgery. otherwise all other extremities full ROM                                      Ext: [  x ]No C/C/E, No calf tenderness,   [   ]other:    Skin: [   ]intact,   [ x  ] other:    surgical site of L hip dry                                                             Neurological Examination:  Cognitive: [   x ] AAO x 3,   [    ]  other:                                                                      Attention:  [  x  ] intact,   [    ]  other:                            Memory: [ x   ] intact,    [    ]  other:     Mood/Affect: [  x  ] wnl,    [    ]  other:                                                                             Communication: [ x   ]Fluent, no dysarthria, following commands:  [    ] other:   CN II - XII:  [ x   ] intact,  [    ] other:                                                                                        Motor:   RIGHT UE: [x   ] WNL,  [   ] other: 5/5 throughout  LEFT    UE: [x   ] WNL,  [   ] other: 5/5 throughout  RIGHT LE: [ x  ] WNL,  [   ] other: 5/5 throughout  LEFT    LE: [   ] WNL,  [  x ] other: 2+/5 HF, unable to assess KF/KE due to pain, 5/5 DF/PF    Tone: [ x   ] wnl,   [    ]  other:  DTRs: [ x  ]symmetric 3+, [   ] other:  Coordination:   [  x  ] intact,   [    ] other:  FTN intact BUE, unable to conduct HTS due to lack of ROM and pain. proprioception intact in BLE                                                                         Sensory: [  x  ] Intact to light touch and proprioception    MEDICATIONS  (STANDING):  acetaminophen     Tablet .. 1000 milliGRAM(s) Oral every 8 hours  chlorhexidine 2% Cloths 1 Application(s) Topical <User Schedule>  enoxaparin Injectable 40 milliGRAM(s) SubCutaneous every 24 hours  folic acid 1 milliGRAM(s) Oral <User Schedule>  ibuprofen  Tablet. 600 milliGRAM(s) Oral <User Schedule>  methotrexate 15 milliGRAM(s) Oral <User Schedule>  pantoprazole    Tablet 40 milliGRAM(s) Oral before breakfast  polyethylene glycol 3350 17 Gram(s) Oral two times a day  senna 2 Tablet(s) Oral daily    MEDICATIONS  (PRN):  aluminum hydroxide/magnesium hydroxide/simethicone Suspension 30 milliLiter(s) Oral every 4 hours PRN Dyspepsia  magnesium citrate Oral Solution 296 milliLiter(s) Oral once PRN Constipation  melatonin 3 milliGRAM(s) Oral at bedtime PRN Insomnia  oxyCODONE    IR 5 milliGRAM(s) Oral every 4 hours PRN moderate to severe pain (4 - 10/10)      RECENT LABS/IMAGING                          12.5   7.31  )-----------( 168      ( 21 Aug 2023 07:20 )             37.8     08-21    142  |  107  |  22<H>  ----------------------------<  104<H>  4.2   |  22  |  0.9    Ca    9.3      21 Aug 2023 07:20  Mg     2.5     08-21    TPro  6.1  /  Alb  4.0  /  TBili  0.5  /  DBili  x   /  AST  38  /  ALT  54<H>  /  AlkPhos  99  08-21    < from: Xray Hip 2-3 Views, Left (08.22.23 @ 13:06) >  The patient is again status post percutaneous screw fixation of a left   femoral neck fracture. The hardware is intact. Fracture alignment is not   appreciably changed. Skin staples are again present.    No acute fracture is seen. There is no dislocation. The patient is again   status post right hip hemiarthroplasty, without evidence of complication.    < end of copied text >     Patient is a 78y old  Female who presents with a chief complaint of Acute Rehabilitation of L femoral neck fracture s/p percutaneous pinning on 8/14/23 (18 Aug 2023 12:42)      HPI:  78F w/PMHx of RA on MTX, Hx of bladder CA s/p TURBT by Dr. 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  Patient reports was crouched and unloading her clothes washer when she tripped and fell over to her left side.  Patient experienced a large amount of pain and was unable to get up.  Patient was on floor approx 30m before she was found by a family member.  They were unable to get the patient up and activated EMS for transport to ED.  Patient with complaints of left hip pain, unable to straighten it or bear weight.  She also has a recent history of 'dizziness', for which she had an extensive outpatient w/u, and has improved after Vestibular Therapy.     Pt found to have a L femoral neck fracture and on 8/14/23 patient went to OR and underwent a closed reduction and percutaneous pinning without any complications.   Her course was complicated by significant elevations in her Transaminase levels, now resolving. She also developed Thrombocytopenia which is stable.     Current level of function of patient prior to admission to rehab with PT on 8/18 reveals Katheryn bed mobility, CGA/ Katheryn transfers, ambulation CG WBAT LLE RW 40ft. With OT on 8/18/23, bathing Katheryn, supervision UBD, modA LBD, toileting modA, supervision grooming, supervision eating    The patient was evaluated by the PM&R team once medically stable. The patient was found to have functional limitation in terms of muscle strength, endurance, physical mobility, and ability to carry out activities of daily living (self care, transfers, and ambulation). The patient was started on a course of bedside therapy, the pt is motivated and able to start 3 hours of therapy  daily for 6-7 days a week. the patient was deemed to be a good candidate for admission for acute inpatient rehab. The patient was admitted to acute inpatient rehab on 8/18/23.   (18 Aug 2023 12:42)    TODAY'S SUBJECTIVE & REVIEW OF SYMPTOMS:  Pt seen and examined at bedside.  No overnight events.  No new complaints.  Left hip pain stable.    Voiding urine/stool spontaneously.    Tolerating therapies well.   VS reviewed.    Mentioned she would prefer getting discharged on 8/31/2023 instead of 9/1/2023.    Current level of functioning  Touch assist with  bed mobility; touch assist with transfers; ambulates 75ft x2 using RW with touch assist.      Constitutional:    [  x ] WNL           [   ] poor appetite   [   ] insomnia   [   ] tired   Cardio:                [ x  ] WNL           [   ] CP   [   ] LOUIS   [   ] palpitations               Resp:                   [x   ] WNL           [   ] SOB   [   ] cough   [   ] wheezing   GI:                        [   ] WNL           [ x  ] constipation   [   ] diarrhea   [   ] abdominal pain   [   ] nausea   [   ] emesis                                :                      [x   ] WNL           [   ] MCLEAN  [   ] dysuria   [   ] difficulty voiding             Endo:                   [x   ] WNL          [   ] polyuria   [   ] temperature intolerance                 Skin:                     [   ] WNL          [   ] pain   [x   ] wound, surgical site dry  [   ] rash   MSK:                    [   ] WNL          [x   ] muscle pain   [  x ] joint pain/ stiffness L hip  [   ] muscle tenderness     Neuro:                 [x   ] WNL          [   ] HA   [   ] change in vision   [   ] tremor   [   ] weakness   [   ]dysphagia              Cognitive:           [x   ] WNL           [   ]confusion      Psych:                  [x   ] WNL           [   ] hallucinations   [   ]agitation   [   ] delusion   [   ]depression      PHYSICAL EXAM  Vital Signs (24 Hrs):  T(C): 36.3 (08-27-23 @ 05:23), Max: 36.5 (08-26-23 @ 19:32)  HR: 61 (08-27-23 @ 05:23) (61 - 73)  BP: 137/61 (08-27-23 @ 05:23) (120/61 - 137/63)  RR: 19 (08-27-23 @ 05:23) (18 - 19)  SpO2: --  Wt(kg): --  Daily     Daily     I&O's Summary      General:[  x ] NAD, Resting Comfortable,   [   ] other:                                HEENT: [ x  ] NC/AT, EOMI, PERRL , Normal Conjunctivae,   [   ] other:  Cardio: [x  ] RRR, no murmur,   [   ] other:                              Pulm: [x   ] No Respiratory Distress,  Lungs CTAB,   [   ] other:                       Abdomen: [ x  ]ND/NT, Soft,   [   ] other:    : [ x  ] NO MCLEAN CATHETER, [   ] MCLEAN CATHETER- no meatal tear, no discharge, [   ] other:                                            MSK: [   ] No joint swelling, Full ROM,   [  x ] other: Mild boutonniere deformity present in RUE thumb.  Reduced ROM of L hip and knee 2/2 to pain and recent surgery. otherwise all other extremities full ROM                                      Ext: [  x ]No C/C/E, No calf tenderness,   [   ]other:    Skin: [   ]intact,   [ x  ] other:    surgical site of L hip dry                                                             Neurological Examination:  Cognitive: [   x ] AAO x 3,   [    ]  other:                                                                      Attention:  [  x  ] intact,   [    ]  other:                            Memory: [ x   ] intact,    [    ]  other:     Mood/Affect: [  x  ] wnl,    [    ]  other:                                                                             Communication: [ x   ]Fluent, no dysarthria, following commands:  [    ] other:   CN II - XII:  [ x   ] intact,  [    ] other:                                                                                        Motor:   RIGHT UE: [x   ] WNL,  [   ] other: 5/5 throughout  LEFT    UE: [x   ] WNL,  [   ] other: 5/5 throughout  RIGHT LE: [ x  ] WNL,  [   ] other: 5/5 throughout  LEFT    LE: [   ] WNL,  [  x ] other: 2+/5 HF, unable to assess KF/KE due to pain, 5/5 DF/PF    Tone: [ x   ] wnl,   [    ]  other:  DTRs: [ x  ]symmetric 3+, [   ] other:  Coordination:   [  x  ] intact,   [    ] other:  FTN intact BUE, unable to conduct HTS due to lack of ROM and pain. proprioception intact in BLE                                                                         Sensory: [  x  ] Intact to light touch and proprioception    MEDICATIONS  (STANDING):  acetaminophen     Tablet .. 1000 milliGRAM(s) Oral every 8 hours  chlorhexidine 2% Cloths 1 Application(s) Topical <User Schedule>  enoxaparin Injectable 40 milliGRAM(s) SubCutaneous every 24 hours  folic acid 1 milliGRAM(s) Oral <User Schedule>  ibuprofen  Tablet. 600 milliGRAM(s) Oral <User Schedule>  methotrexate 15 milliGRAM(s) Oral <User Schedule>  pantoprazole    Tablet 40 milliGRAM(s) Oral before breakfast  polyethylene glycol 3350 17 Gram(s) Oral two times a day  senna 2 Tablet(s) Oral daily    MEDICATIONS  (PRN):  aluminum hydroxide/magnesium hydroxide/simethicone Suspension 30 milliLiter(s) Oral every 4 hours PRN Dyspepsia  magnesium citrate Oral Solution 296 milliLiter(s) Oral once PRN Constipation  melatonin 3 milliGRAM(s) Oral at bedtime PRN Insomnia  oxyCODONE    IR 5 milliGRAM(s) Oral every 4 hours PRN moderate to severe pain (4 - 10/10)      RECENT LABS/IMAGING                          12.5   7.31  )-----------( 168      ( 21 Aug 2023 07:20 )             37.8     08-21    142  |  107  |  22<H>  ----------------------------<  104<H>  4.2   |  22  |  0.9    Ca    9.3      21 Aug 2023 07:20  Mg     2.5     08-21    TPro  6.1  /  Alb  4.0  /  TBili  0.5  /  DBili  x   /  AST  38  /  ALT  54<H>  /  AlkPhos  99  08-21    < from: Xray Hip 2-3 Views, Left (08.22.23 @ 13:06) >  The patient is again status post percutaneous screw fixation of a left   femoral neck fracture. The hardware is intact. Fracture alignment is not   appreciably changed. Skin staples are again present.    No acute fracture is seen. There is no dislocation. The patient is again   status post right hip hemiarthroplasty, without evidence of complication.    < end of copied text >

## 2023-08-27 NOTE — PROGRESS NOTE ADULT - ASSESSMENT
ASSESSMENT/PLAN  78F w/PMHx of RA on MTX, Hx of bladder CA s/p TURBT by Dr. 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  Patient reports was crouched and unloading her clothes washer when she tripped and fell over to her left side.  Pt with left femoral neck fracture s/p closed reduction and percutaneous pinning of left hip on 8/14 and admitted to acute rehab on 8/18.    #Rehab of Fall s/p L femoral neck fracture s/p closed reduction and percutaneous pinning of left hip on 8/14.  - Patient requires a full rehab program of 3 hours a day of PT, OT  -orthopedics following  - patient c/o ongoing left hip/ groin pain with ROM/ walking.  F/U x-ray with no displacement of pins  - Patient requesting Ibuprofen 600mg daily which she takes at home. Ordered  - c/w ibuprofen 600 mg po daily with breakfast  - c/w Oxycodone 5 mg q4hr prn mod pain; wean off narcotics as appropriate  - Acetominophen changed to 1000 mg q 8 hrs. standing  - Weight bearing: WBAT LLE with walker    #Normocytic anemia; likely 2/2 postop blood loss.  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 after discharge  - avoid high BPs    #Constipation (resolving)  Not associated with abdominal pain.  - c/w miralax, senna  - s/p bisacodyl  - had BMs. Monitor on current meds    #Misc  -GI/Bowel Mgmt: as above, protonix 40mg qAM  - Melatonin 3md qhs   -Skin: monitor incision    - Diet: Regular diet        Precautions / PROPHYLAXIS:      - Falls, safety    - Ortho: Weight bearing status: WBAT LLE with walker      - DVT prophylaxis: SQH 5000u q8hr ; on d/c aspirin 81 mg BID

## 2023-08-28 LAB
24R-OH-CALCIDIOL SERPL-MCNC: 31 NG/ML — SIGNIFICANT CHANGE UP (ref 30–80)
ALBUMIN SERPL ELPH-MCNC: 4.3 G/DL — SIGNIFICANT CHANGE UP (ref 3.5–5.2)
ALP SERPL-CCNC: 107 U/L — SIGNIFICANT CHANGE UP (ref 30–115)
ALT FLD-CCNC: 40 U/L — SIGNIFICANT CHANGE UP (ref 0–41)
ANION GAP SERPL CALC-SCNC: 12 MMOL/L — SIGNIFICANT CHANGE UP (ref 7–14)
AST SERPL-CCNC: 34 U/L — SIGNIFICANT CHANGE UP (ref 0–41)
BASOPHILS # BLD AUTO: 0.03 K/UL — SIGNIFICANT CHANGE UP (ref 0–0.2)
BASOPHILS NFR BLD AUTO: 0.5 % — SIGNIFICANT CHANGE UP (ref 0–1)
BILIRUB SERPL-MCNC: 0.3 MG/DL — SIGNIFICANT CHANGE UP (ref 0.2–1.2)
BUN SERPL-MCNC: 19 MG/DL — SIGNIFICANT CHANGE UP (ref 10–20)
CALCIUM SERPL-MCNC: 9.7 MG/DL — SIGNIFICANT CHANGE UP (ref 8.4–10.5)
CHLORIDE SERPL-SCNC: 110 MMOL/L — SIGNIFICANT CHANGE UP (ref 98–110)
CO2 SERPL-SCNC: 22 MMOL/L — SIGNIFICANT CHANGE UP (ref 17–32)
CREAT SERPL-MCNC: 0.9 MG/DL — SIGNIFICANT CHANGE UP (ref 0.7–1.5)
EGFR: 65 ML/MIN/1.73M2 — SIGNIFICANT CHANGE UP
EOSINOPHIL # BLD AUTO: 0.15 K/UL — SIGNIFICANT CHANGE UP (ref 0–0.7)
EOSINOPHIL NFR BLD AUTO: 2.6 % — SIGNIFICANT CHANGE UP (ref 0–8)
GLUCOSE SERPL-MCNC: 97 MG/DL — SIGNIFICANT CHANGE UP (ref 70–99)
HCT VFR BLD CALC: 41.9 % — SIGNIFICANT CHANGE UP (ref 37–47)
HGB BLD-MCNC: 13.4 G/DL — SIGNIFICANT CHANGE UP (ref 12–16)
IMM GRANULOCYTES NFR BLD AUTO: 0.3 % — SIGNIFICANT CHANGE UP (ref 0.1–0.3)
LYMPHOCYTES # BLD AUTO: 1.07 K/UL — LOW (ref 1.2–3.4)
LYMPHOCYTES # BLD AUTO: 18.5 % — LOW (ref 20.5–51.1)
MAGNESIUM SERPL-MCNC: 2.1 MG/DL — SIGNIFICANT CHANGE UP (ref 1.8–2.4)
MCHC RBC-ENTMCNC: 32 G/DL — SIGNIFICANT CHANGE UP (ref 32–37)
MCHC RBC-ENTMCNC: 32.2 PG — HIGH (ref 27–31)
MCV RBC AUTO: 100.7 FL — HIGH (ref 81–99)
MONOCYTES # BLD AUTO: 0.26 K/UL — SIGNIFICANT CHANGE UP (ref 0.1–0.6)
MONOCYTES NFR BLD AUTO: 4.5 % — SIGNIFICANT CHANGE UP (ref 1.7–9.3)
NEUTROPHILS # BLD AUTO: 4.26 K/UL — SIGNIFICANT CHANGE UP (ref 1.4–6.5)
NEUTROPHILS NFR BLD AUTO: 73.6 % — SIGNIFICANT CHANGE UP (ref 42.2–75.2)
NRBC # BLD: 0 /100 WBCS — SIGNIFICANT CHANGE UP (ref 0–0)
PLATELET # BLD AUTO: 175 K/UL — SIGNIFICANT CHANGE UP (ref 130–400)
PMV BLD: 13 FL — HIGH (ref 7.4–10.4)
POTASSIUM SERPL-MCNC: 4.2 MMOL/L — SIGNIFICANT CHANGE UP (ref 3.5–5)
POTASSIUM SERPL-SCNC: 4.2 MMOL/L — SIGNIFICANT CHANGE UP (ref 3.5–5)
PROT SERPL-MCNC: 6.6 G/DL — SIGNIFICANT CHANGE UP (ref 6–8)
RBC # BLD: 4.16 M/UL — LOW (ref 4.2–5.4)
RBC # FLD: 13.5 % — SIGNIFICANT CHANGE UP (ref 11.5–14.5)
SODIUM SERPL-SCNC: 144 MMOL/L — SIGNIFICANT CHANGE UP (ref 135–146)
WBC # BLD: 5.79 K/UL — SIGNIFICANT CHANGE UP (ref 4.8–10.8)
WBC # FLD AUTO: 5.79 K/UL — SIGNIFICANT CHANGE UP (ref 4.8–10.8)

## 2023-08-28 RX ORDER — SENNA PLUS 8.6 MG/1
2 TABLET ORAL DAILY
Refills: 0 | Status: DISCONTINUED | OUTPATIENT
Start: 2023-08-28 | End: 2023-08-31

## 2023-08-28 RX ORDER — POLYETHYLENE GLYCOL 3350 17 G/17G
17 POWDER, FOR SOLUTION ORAL DAILY
Refills: 0 | Status: DISCONTINUED | OUTPATIENT
Start: 2023-08-28 | End: 2023-08-31

## 2023-08-28 RX ORDER — OXYCODONE HYDROCHLORIDE 5 MG/1
5 TABLET ORAL EVERY 4 HOURS
Refills: 0 | Status: DISCONTINUED | OUTPATIENT
Start: 2023-08-30 | End: 2023-08-31

## 2023-08-28 RX ADMIN — Medication 1 MILLIGRAM(S): at 08:03

## 2023-08-28 RX ADMIN — Medication 1000 MILLIGRAM(S): at 21:34

## 2023-08-28 RX ADMIN — Medication 1000 MILLIGRAM(S): at 15:50

## 2023-08-28 RX ADMIN — PANTOPRAZOLE SODIUM 40 MILLIGRAM(S): 20 TABLET, DELAYED RELEASE ORAL at 08:03

## 2023-08-28 RX ADMIN — ENOXAPARIN SODIUM 40 MILLIGRAM(S): 100 INJECTION SUBCUTANEOUS at 08:09

## 2023-08-28 RX ADMIN — OXYCODONE HYDROCHLORIDE 5 MILLIGRAM(S): 5 TABLET ORAL at 08:06

## 2023-08-28 NOTE — PROGRESS NOTE ADULT - SUBJECTIVE AND OBJECTIVE BOX
Patient is a 78y old  Female who presents with a chief complaint of Acute Rehabilitation of L femoral neck fracture s/p percutaneous pinning on 8/14/23 (18 Aug 2023 12:42)      HPI:  78F w/PMHx of RA on MTX, Hx of bladder CA s/p TURBT by Dr. 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  Patient reports was crouched and unloading her clothes washer when she tripped and fell over to her left side.  Patient experienced a large amount of pain and was unable to get up.  Patient was on floor approx 30m before she was found by a family member.  They were unable to get the patient up and activated EMS for transport to ED.  Patient with complaints of left hip pain, unable to straighten it or bear weight.  She also has a recent history of 'dizziness', for which she had an extensive outpatient w/u, and has improved after Vestibular Therapy.     Pt found to have a L femoral neck fracture and on 8/14/23 patient went to OR and underwent a closed reduction and percutaneous pinning without any complications.   Her course was complicated by significant elevations in her Transaminase levels, now resolving. She also developed Thrombocytopenia which is stable.     Current level of function of patient prior to admission to rehab with PT on 8/18 reveals Ktaheryn bed mobility, CGA/ Katheryn transfers, ambulation CG WBAT LLE RW 40ft. With OT on 8/18/23, bathing Katheryn, supervision UBD, modA LBD, toileting modA, supervision grooming, supervision eating    The patient was evaluated by the PM&R team once medically stable. The patient was found to have functional limitation in terms of muscle strength, endurance, physical mobility, and ability to carry out activities of daily living (self care, transfers, and ambulation). The patient was started on a course of bedside therapy, the pt is motivated and able to start 3 hours of therapy  daily for 6-7 days a week. the patient was deemed to be a good candidate for admission for acute inpatient rehab. The patient was admitted to acute inpatient rehab on 8/18/23.   (18 Aug 2023 12:42)    TODAY'S SUBJECTIVE & REVIEW OF SYMPTOMS:  Pt seen and examined at bedside.  No overnight events.  No new complaints.  Left hip pain stable.    Voiding urine/stool spontaneously.    Tolerating therapies well.   VS reviewed.  labs reviewed.    Mentioned she would prefer getting discharged on 8/31/2023 instead of 9/1/2023.    Current level of functioning  Touch assist with  bed mobility; touch assist with transfers; ambulates 75ft x2 using RW with touch assist.      Constitutional:    [  x ] WNL           [   ] poor appetite   [   ] insomnia   [   ] tired   Cardio:                [ x  ] WNL           [   ] CP   [   ] LOUIS   [   ] palpitations               Resp:                   [x   ] WNL           [   ] SOB   [   ] cough   [   ] wheezing   GI:                        [   ] WNL           [ x  ] constipation   [   ] diarrhea   [   ] abdominal pain   [   ] nausea   [   ] emesis                                :                      [x   ] WNL           [   ] MCLEAN  [   ] dysuria   [   ] difficulty voiding             Endo:                   [x   ] WNL          [   ] polyuria   [   ] temperature intolerance                 Skin:                     [   ] WNL          [   ] pain   [x   ] wound, surgical site dry  [   ] rash   MSK:                    [   ] WNL          [x   ] muscle pain   [  x ] joint pain/ stiffness L hip  [   ] muscle tenderness     Neuro:                 [x   ] WNL          [   ] HA   [   ] change in vision   [   ] tremor   [   ] weakness   [   ]dysphagia              Cognitive:           [x   ] WNL           [   ]confusion      Psych:                  [x   ] WNL           [   ] hallucinations   [   ]agitation   [   ] delusion   [   ]depression      PHYSICAL EXAM  Vital Signs (24 Hrs):  T(C): 36.4 (08-28-23 @ 05:38), Max: 36.4 (08-28-23 @ 05:38)  HR: 68 (08-28-23 @ 05:38) (65 - 68)  BP: 130/59 (08-28-23 @ 05:38) (122/58 - 138/65)  RR: 18 (08-28-23 @ 05:38) (18 - 18)  SpO2: --  Wt(kg): --  Daily     Daily     I&O's Summary      General:[  x ] NAD, Resting Comfortable,   [   ] other:                                HEENT: [ x  ] NC/AT, EOMI, PERRL , Normal Conjunctivae,   [   ] other:  Cardio: [x  ] RRR, no murmur,   [   ] other:                              Pulm: [x   ] No Respiratory Distress,  Lungs CTAB,   [   ] other:                       Abdomen: [ x  ]ND/NT, Soft,   [   ] other:    : [ x  ] NO MCLEAN CATHETER, [   ] MCLEAN CATHETER- no meatal tear, no discharge, [   ] other:                                            MSK: [   ] No joint swelling, Full ROM,   [  x ] other: Mild boutonniere deformity present in RUE thumb.  Reduced ROM of L hip and knee 2/2 to pain and recent surgery. otherwise all other extremities full ROM                                      Ext: [  x ]No C/C/E, No calf tenderness,   [   ]other:    Skin: [   ]intact,   [ x  ] other:    surgical site of L hip dry                                                             Neurological Examination:  Cognitive: [   x ] AAO x 3,   [    ]  other:                                                                      Attention:  [  x  ] intact,   [    ]  other:                            Memory: [ x   ] intact,    [    ]  other:     Mood/Affect: [  x  ] wnl,    [    ]  other:                                                                             Communication: [ x   ]Fluent, no dysarthria, following commands:  [    ] other:   CN II - XII:  [ x   ] intact,  [    ] other:                                                                                        Motor:   RIGHT UE: [x   ] WNL,  [   ] other: 5/5 throughout  LEFT    UE: [x   ] WNL,  [   ] other: 5/5 throughout  RIGHT LE: [ x  ] WNL,  [   ] other: 5/5 throughout  LEFT    LE: [   ] WNL,  [  x ] other: 2+/5 HF, unable to assess KF/KE due to pain, 5/5 DF/PF    Tone: [ x   ] wnl,   [    ]  other:  DTRs: [ x  ]symmetric 3+, [   ] other:  Coordination:   [  x  ] intact,   [    ] other:  FTN intact BUE, unable to conduct HTS due to lack of ROM and pain. proprioception intact in BLE                                                                         Sensory: [  x  ] Intact to light touch and proprioception      MEDICATIONS  (STANDING):  acetaminophen     Tablet .. 1000 milliGRAM(s) Oral every 8 hours  chlorhexidine 2% Cloths 1 Application(s) Topical <User Schedule>  enoxaparin Injectable 40 milliGRAM(s) SubCutaneous every 24 hours  folic acid 1 milliGRAM(s) Oral <User Schedule>  ibuprofen  Tablet. 600 milliGRAM(s) Oral <User Schedule>  methotrexate 15 milliGRAM(s) Oral <User Schedule>  pantoprazole    Tablet 40 milliGRAM(s) Oral before breakfast  polyethylene glycol 3350 17 Gram(s) Oral two times a day  senna 2 Tablet(s) Oral daily    MEDICATIONS  (PRN):  aluminum hydroxide/magnesium hydroxide/simethicone Suspension 30 milliLiter(s) Oral every 4 hours PRN Dyspepsia  magnesium citrate Oral Solution 296 milliLiter(s) Oral once PRN Constipation  melatonin 3 milliGRAM(s) Oral at bedtime PRN Insomnia  oxyCODONE    IR 5 milliGRAM(s) Oral every 4 hours PRN moderate to severe pain (4 - 10/10)      RECENT LABS/IMAGING                        13.4   5.79  )-----------( 175      ( 28 Aug 2023 08:20 )             41.9     08-28    144  |  110  |  19  ----------------------------<  97  4.2   |  22  |  0.9    Ca    9.7      28 Aug 2023 08:20  Mg     2.1     08-28    TPro  6.6  /  Alb  4.3  /  TBili  0.3  /  DBili  x   /  AST  34  /  ALT  40  /  AlkPhos  107  08-28      Urinalysis Basic - ( 28 Aug 2023 08:20 )    Color: x / Appearance: x / SG: x / pH: x  Gluc: 97 mg/dL / Ketone: x  / Bili: x / Urobili: x   Blood: x / Protein: x / Nitrite: x   Leuk Esterase: x / RBC: x / WBC x   Sq Epi: x / Non Sq Epi: x / Bacteria: x                                12.5   7.31  )-----------( 168      ( 21 Aug 2023 07:20 )             37.8     08-21    142  |  107  |  22<H>  ----------------------------<  104<H>  4.2   |  22  |  0.9    Ca    9.3      21 Aug 2023 07:20  Mg     2.5     08-21    TPro  6.1  /  Alb  4.0  /  TBili  0.5  /  DBili  x   /  AST  38  /  ALT  54<H>  /  AlkPhos  99  08-21    < from: Xray Hip 2-3 Views, Left (08.22.23 @ 13:06) >  The patient is again status post percutaneous screw fixation of a left   femoral neck fracture. The hardware is intact. Fracture alignment is not   appreciably changed. Skin staples are again present.    No acute fracture is seen. There is no dislocation. The patient is again   status post right hip hemiarthroplasty, without evidence of complication.    < end of copied text >     Patient is a 78y old  Female who presents with a chief complaint of Acute Rehabilitation of L femoral neck fracture s/p percutaneous pinning on 8/14/23 (18 Aug 2023 12:42)      HPI:  78F w/PMHx of RA on MTX, Hx of bladder CA s/p TURBT by Dr. 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  Patient reports was crouched and unloading her clothes washer when she tripped and fell over to her left side.  Patient experienced a large amount of pain and was unable to get up.  Patient was on floor approx 30m before she was found by a family member.  They were unable to get the patient up and activated EMS for transport to ED.  Patient with complaints of left hip pain, unable to straighten it or bear weight.  She also has a recent history of 'dizziness', for which she had an extensive outpatient w/u, and has improved after Vestibular Therapy.     Pt found to have a L femoral neck fracture and on 8/14/23 patient went to OR and underwent a closed reduction and percutaneous pinning without any complications.   Her course was complicated by significant elevations in her Transaminase levels, now resolving. She also developed Thrombocytopenia which is stable.     Current level of function of patient prior to admission to rehab with PT on 8/18 reveals Katheryn bed mobility, CGA/ Katheryn transfers, ambulation CG WBAT LLE RW 40ft. With OT on 8/18/23, bathing Katheryn, supervision UBD, modA LBD, toileting modA, supervision grooming, supervision eating    The patient was evaluated by the PM&R team once medically stable. The patient was found to have functional limitation in terms of muscle strength, endurance, physical mobility, and ability to carry out activities of daily living (self care, transfers, and ambulation). The patient was started on a course of bedside therapy, the pt is motivated and able to start 3 hours of therapy  daily for 6-7 days a week. the patient was deemed to be a good candidate for admission for acute inpatient rehab. The patient was admitted to acute inpatient rehab on 8/18/23.   (18 Aug 2023 12:42)    TODAY'S SUBJECTIVE & REVIEW OF SYMPTOMS:  Pt seen and examined at bedside.  No overnight events.  No new complaints.  Left hip pain stable.    Voiding urine/stool spontaneously.    Tolerating therapies well.   VS reviewed.  labs reviewed.    Current level of functioning  Touch assist with  bed mobility; touch assist with transfers; ambulates 75ft x2 using RW with touch assist.      Constitutional:    [  x ] WNL           [   ] poor appetite   [   ] insomnia   [   ] tired   Cardio:                [ x  ] WNL           [   ] CP   [   ] LOUIS   [   ] palpitations               Resp:                   [x   ] WNL           [   ] SOB   [   ] cough   [   ] wheezing   GI:                        [   ] WNL           [ x  ] constipation   [   ] diarrhea   [   ] abdominal pain   [   ] nausea   [   ] emesis                                :                      [x   ] WNL           [   ] MCLEAN  [   ] dysuria   [   ] difficulty voiding             Endo:                   [x   ] WNL          [   ] polyuria   [   ] temperature intolerance                 Skin:                     [   ] WNL          [   ] pain   [x   ] wound, surgical site dry  [   ] rash   MSK:                    [   ] WNL          [x   ] muscle pain   [  x ] joint pain/ stiffness L hip  [   ] muscle tenderness     Neuro:                 [x   ] WNL          [   ] HA   [   ] change in vision   [   ] tremor   [   ] weakness   [   ]dysphagia              Cognitive:           [x   ] WNL           [   ]confusion      Psych:                  [x   ] WNL           [   ] hallucinations   [   ]agitation   [   ] delusion   [   ]depression      PHYSICAL EXAM  Vital Signs (24 Hrs):  T(C): 36.4 (08-28-23 @ 05:38), Max: 36.4 (08-28-23 @ 05:38)  HR: 68 (08-28-23 @ 05:38) (65 - 68)  BP: 130/59 (08-28-23 @ 05:38) (122/58 - 138/65)  RR: 18 (08-28-23 @ 05:38) (18 - 18)      General:[  x ] NAD, Resting Comfortable,   [   ] other:                                HEENT: [ x  ] NC/AT, EOMI, PERRL , Normal Conjunctivae,   [   ] other:  Cardio: [x  ] RRR, no murmur,   [   ] other:                              Pulm: [x   ] No Respiratory Distress,  Lungs CTAB,   [   ] other:                       Abdomen: [ x  ]ND/NT, Soft,   [   ] other:    : [ x  ] NO MCLEAN CATHETER, [   ] MCLEAN CATHETER- no meatal tear, no discharge, [   ] other:                                            MSK: [   ] No joint swelling, Full ROM,   [  x ] other: Mild boutonniere deformity present in RUE thumb.  Reduced ROM of L hip and knee 2/2 to pain and recent surgery. otherwise all other extremities full ROM                                      Ext: [  x ]No C/C/E, No calf tenderness,   [   ]other:    Skin: [   ]intact,   [ x  ] other:    surgical site of L hip dry                                                             Neurological Examination:  Cognitive: [   x ] AAO x 3,   [    ]  other:                                                                      Attention:  [  x  ] intact,   [    ]  other:                            Memory: [ x   ] intact,    [    ]  other:     Mood/Affect: [  x  ] wnl,    [    ]  other:                                                                             Communication: [ x   ]Fluent, no dysarthria, following commands:  [    ] other:   CN II - XII:  [ x   ] intact,  [    ] other:                                                                                        Motor:   RIGHT UE: [x   ] WNL,  [   ] other: 5/5 throughout  LEFT    UE: [x   ] WNL,  [   ] other: 5/5 throughout  RIGHT LE: [ x  ] WNL,  [   ] other: 5/5 throughout  LEFT    LE: [   ] WNL,  [  x ] other: 2+/5 HF, unable to assess KF/KE due to pain, 5/5 DF/PF    Tone: [ x   ] wnl,   [    ]  other:  DTRs: [ x  ]symmetric 3+, [   ] other:  Coordination:   [  x  ] intact,   [    ] other:  FTN intact BUE, unable to conduct HTS due to lack of ROM and pain. proprioception intact in BLE                                                                         Sensory: [  x  ] Intact to light touch and proprioception      MEDICATIONS  (STANDING):  acetaminophen     Tablet .. 1000 milliGRAM(s) Oral every 8 hours  chlorhexidine 2% Cloths 1 Application(s) Topical <User Schedule>  enoxaparin Injectable 40 milliGRAM(s) SubCutaneous every 24 hours  folic acid 1 milliGRAM(s) Oral <User Schedule>  ibuprofen  Tablet. 600 milliGRAM(s) Oral <User Schedule>  methotrexate 15 milliGRAM(s) Oral <User Schedule>  pantoprazole    Tablet 40 milliGRAM(s) Oral before breakfast  polyethylene glycol 3350 17 Gram(s) Oral two times a day  senna 2 Tablet(s) Oral daily    MEDICATIONS  (PRN):  aluminum hydroxide/magnesium hydroxide/simethicone Suspension 30 milliLiter(s) Oral every 4 hours PRN Dyspepsia  magnesium citrate Oral Solution 296 milliLiter(s) Oral once PRN Constipation  melatonin 3 milliGRAM(s) Oral at bedtime PRN Insomnia  oxyCODONE    IR 5 milliGRAM(s) Oral every 4 hours PRN moderate to severe pain (4 - 10/10)      RECENT LABS/IMAGING                        13.4   5.79  )-----------( 175      ( 28 Aug 2023 08:20 )             41.9     08-28    144  |  110  |  19  ----------------------------<  97  4.2   |  22  |  0.9    Ca    9.7      28 Aug 2023 08:20  Mg     2.1     08-28    TPro  6.6  /  Alb  4.3  /  TBili  0.3  /  DBili  x   /  AST  34  /  ALT  40  /  AlkPhos  107  08-28    TPro  6.1  /  Alb  4.0  /  TBili  0.5  /  DBili  x   /  AST  38  /  ALT  54<H>  /  AlkPhos  99  08-21    < from: Xray Hip 2-3 Views, Left (08.22.23 @ 13:06) >  The patient is again status post percutaneous screw fixation of a left   femoral neck fracture. The hardware is intact. Fracture alignment is not   appreciably changed. Skin staples are again present.    No acute fracture is seen. There is no dislocation. The patient is again   status post right hip hemiarthroplasty, without evidence of complication.    < end of copied text >

## 2023-08-28 NOTE — PROGRESS NOTE ADULT - ASSESSMENT
ASSESSMENT/PLAN  78F w/PMHx of RA on MTX, Hx of bladder CA s/p TURBT by Dr. 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  Patient reports was crouched and unloading her clothes washer when she tripped and fell over to her left side.  Pt with left femoral neck fracture s/p closed reduction and percutaneous pinning of left hip on 8/14 and admitted to acute rehab on 8/18.    #Rehab of Fall s/p L femoral neck fracture s/p closed reduction and percutaneous pinning of left hip on 8/14.  - Patient requires a full rehab program of 3 hours a day of PT, OT  -orthopedics following  - patient c/o ongoing left hip/ groin pain with ROM/ walking.  F/U x-ray with no displacement of pins  - Patient requesting Ibuprofen 600mg daily which she takes at home. Ordered  - c/w ibuprofen 600 mg po daily with breakfast  - c/w Oxycodone 5 mg q4hr prn mod pain; wean off narcotics as appropriate  - Acetominophen changed to 1000 mg q 8 hrs. standing  - Weight bearing: WBAT LLE with walker    #Normocytic anemia; likely 2/2 postop blood loss.  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 after discharge  - avoid high BPs    #Constipation (resolving)  Not associated with abdominal pain.  - c/w miralax, senna  - s/p bisacodyl  - had BMs. Monitor on current meds    #Misc  -GI/Bowel Mgmt: as above, protonix 40mg qAM  - Melatonin 3md qhs   -Skin: monitor incision    - Diet: Regular diet        Precautions / PROPHYLAXIS:      - Falls, safety    - Ortho: Weight bearing status: WBAT LLE with walker      - DVT prophylaxis: SQH 5000u q8hr ; on d/c aspirin 81 mg BID   ASSESSMENT/PLAN  78F w/PMHx of RA on MTX, Hx of bladder CA s/p TURBT by Dr. 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  Patient reports was crouched and unloading her clothes washer when she tripped and fell over to her left side.  Pt with left femoral neck fracture s/p closed reduction and percutaneous pinning of left hip on 8/14 and admitted to acute rehab on 8/18.    #Rehab of Fall s/p L femoral neck fracture s/p closed reduction and percutaneous pinning of left hip on 8/14.  - Patient requires a full rehab program of 3 hours a day of PT, OT  -orthopedics following  - patient c/o ongoing left hip/ groin pain with ROM/ walking.  F/U x-ray with no displacement of pins  - Patient requesting Ibuprofen 600mg daily which she takes at home. Ordered  - c/w ibuprofen 600 mg po daily with breakfast  - c/w Oxycodone 5 mg q4hr prn mod pain; wean off narcotics as appropriate  - Acetominophen changed to 1000 mg q 8 hrs. standing  - Weight bearing: WBAT LLE with walker  - continue full rehab program    #s/p Normocytic anemia; likely 2/2 postop blood loss.  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 after discharge  - avoid high BPs    #Constipation (resolving)  Not associated with abdominal pain.  - c/w miralax, senna  - s/p bisacodyl  - had BMs. Monitor on current meds    #Misc  -GI/Bowel Mgmt: as above, protonix 40mg qAM  - Melatonin 3md qhs   -Skin: monitor incision    - Diet: Regular diet        Precautions / PROPHYLAXIS:      - Falls, safety    - Ortho: Weight bearing status: WBAT LLE with walker      - DVT prophylaxis: SQH 5000u q8hr ; on d/c aspirin 81 mg BID

## 2023-08-29 RX ADMIN — Medication 1 MILLIGRAM(S): at 07:36

## 2023-08-29 RX ADMIN — CHLORHEXIDINE GLUCONATE 1 APPLICATION(S): 213 SOLUTION TOPICAL at 06:10

## 2023-08-29 RX ADMIN — OXYCODONE HYDROCHLORIDE 5 MILLIGRAM(S): 5 TABLET ORAL at 08:12

## 2023-08-29 RX ADMIN — Medication 600 MILLIGRAM(S): at 07:36

## 2023-08-29 RX ADMIN — Medication 1000 MILLIGRAM(S): at 07:37

## 2023-08-29 RX ADMIN — Medication 1000 MILLIGRAM(S): at 15:35

## 2023-08-29 RX ADMIN — ENOXAPARIN SODIUM 40 MILLIGRAM(S): 100 INJECTION SUBCUTANEOUS at 06:10

## 2023-08-29 RX ADMIN — PANTOPRAZOLE SODIUM 40 MILLIGRAM(S): 20 TABLET, DELAYED RELEASE ORAL at 06:11

## 2023-08-29 NOTE — PROGRESS NOTE ADULT - ASSESSMENT
ASSESSMENT/PLAN  78F w/PMHx of RA on MTX, Hx of bladder CA s/p TURBT by Dr. 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  Patient reports was crouched and unloading her clothes washer when she tripped and fell over to her left side.  Pt with left femoral neck fracture s/p closed reduction and percutaneous pinning of left hip on 8/14 and admitted to acute rehab on 8/18.    #Rehab of Fall s/p L femoral neck fracture s/p closed reduction and percutaneous pinning of left hip on 8/14.  - Patient requires a full rehab program of 3 hours a day of PT, OT  -orthopedics following  - patient c/o ongoing left hip/ groin pain with ROM/ walking.  F/U x-ray with no displacement of pins  - Patient requesting Ibuprofen 600mg daily which she takes at home. Ordered  - c/w ibuprofen 600 mg po daily with breakfast  - c/w Oxycodone 5 mg q4hr prn mod pain; wean off narcotics as appropriate  - Acetominophen changed to 1000 mg q 8 hrs. standing  - Weight bearing: WBAT LLE with walker  - continue full rehab program    #s/p Normocytic anemia; likely 2/2 postop blood loss.  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 after discharge  - avoid high BPs    #Constipation (resolving)  Not associated with abdominal pain.  - c/w miralax, senna  - s/p bisacodyl  - had BMs. Monitor on current meds    #Misc  -GI/Bowel Mgmt: as above, protonix 40mg qAM  - Melatonin 3md qhs   -Skin: monitor incision    - Diet: Regular diet        Precautions / PROPHYLAXIS:      - Falls, safety    - Ortho: Weight bearing status: WBAT LLE with walker      - DVT prophylaxis: SQH 5000u q8hr ; on d/c aspirin 81 mg BID

## 2023-08-29 NOTE — PROGRESS NOTE ADULT - SUBJECTIVE AND OBJECTIVE BOX
Patient is a 78y old  Female who presents with a chief complaint of Acute Rehabilitation of L femoral neck fracture s/p percutaneous pinning on 8/14/23 (18 Aug 2023 12:42)      HPI:  78F w/PMHx of RA on MTX, Hx of bladder CA s/p TURBT by Dr. 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  Patient reports was crouched and unloading her clothes washer when she tripped and fell over to her left side.  Patient experienced a large amount of pain and was unable to get up.  Patient was on floor approx 30m before she was found by a family member.  They were unable to get the patient up and activated EMS for transport to ED.  Patient with complaints of left hip pain, unable to straighten it or bear weight.  She also has a recent history of 'dizziness', for which she had an extensive outpatient w/u, and has improved after Vestibular Therapy.     Pt found to have a L femoral neck fracture and on 8/14/23 patient went to OR and underwent a closed reduction and percutaneous pinning without any complications.   Her course was complicated by significant elevations in her Transaminase levels, now resolving. She also developed Thrombocytopenia which is stable.     Current level of function of patient prior to admission to rehab with PT on 8/18 reveals Katheryn bed mobility, CGA/ Katheryn transfers, ambulation CG WBAT LLE RW 40ft. With OT on 8/18/23, bathing Katheryn, supervision UBD, modA LBD, toileting modA, supervision grooming, supervision eating    The patient was evaluated by the PM&R team once medically stable. The patient was found to have functional limitation in terms of muscle strength, endurance, physical mobility, and ability to carry out activities of daily living (self care, transfers, and ambulation). The patient was started on a course of bedside therapy, the pt is motivated and able to start 3 hours of therapy  daily for 6-7 days a week. the patient was deemed to be a good candidate for admission for acute inpatient rehab. The patient was admitted to acute inpatient rehab on 8/18/23.   (18 Aug 2023 12:42)    TODAY'S SUBJECTIVE & REVIEW OF SYMPTOMS:  Pt seen and examined at bedside.  No overnight events.  No new complaints.  Left hip pain stable.    Voiding urine/stool spontaneously.    Tolerating therapies well.   VS reviewed.  labs reviewed.    Current level of functioning  Touch assist with  bed mobility; touch assist with transfers; ambulates 75ft x2 using RW with touch assist.      Constitutional:    [  x ] WNL           [   ] poor appetite   [   ] insomnia   [   ] tired   Cardio:                [ x  ] WNL           [   ] CP   [   ] LOUIS   [   ] palpitations               Resp:                   [x   ] WNL           [   ] SOB   [   ] cough   [   ] wheezing   GI:                        [   ] WNL           [ x  ] constipation   [   ] diarrhea   [   ] abdominal pain   [   ] nausea   [   ] emesis                                :                      [x   ] WNL           [   ] MCLEAN  [   ] dysuria   [   ] difficulty voiding             Endo:                   [x   ] WNL          [   ] polyuria   [   ] temperature intolerance                 Skin:                     [   ] WNL          [   ] pain   [x   ] wound, surgical site dry  [   ] rash   MSK:                    [   ] WNL          [x   ] muscle pain   [  x ] joint pain/ stiffness L hip  [   ] muscle tenderness     Neuro:                 [x   ] WNL          [   ] HA   [   ] change in vision   [   ] tremor   [   ] weakness   [   ]dysphagia              Cognitive:           [x   ] WNL           [   ]confusion      Psych:                  [x   ] WNL           [   ] hallucinations   [   ]agitation   [   ] delusion   [   ]depression      PHYSICAL EXAM  Vital Signs (24 Hrs):  T(C): 36.2 (08-29-23 @ 05:43), Max: 36.7 (08-28-23 @ 20:27)  HR: 65 (08-29-23 @ 05:43) (64 - 77)  BP: 135/63 (08-29-23 @ 05:43) (120/59 - 135/63)  RR: 20 (08-29-23 @ 05:43) (18 - 20)  SpO2: --  Wt(kg): --  Daily     Daily     I&O's Summary        General:[  x ] NAD, Resting Comfortable,   [   ] other:                                HEENT: [ x  ] NC/AT, EOMI, PERRL , Normal Conjunctivae,   [   ] other:  Cardio: [x  ] RRR, no murmur,   [   ] other:                              Pulm: [x   ] No Respiratory Distress,  Lungs CTAB,   [   ] other:                       Abdomen: [ x  ]ND/NT, Soft,   [   ] other:    : [ x  ] NO MCLEAN CATHETER, [   ] MCLEAN CATHETER- no meatal tear, no discharge, [   ] other:                                            MSK: [   ] No joint swelling, Full ROM,   [  x ] other: Mild boutonniere deformity present in RUE thumb.  Reduced ROM of L hip and knee 2/2 to pain and recent surgery. otherwise all other extremities full ROM                                      Ext: [  x ]No C/C/E, No calf tenderness,   [   ]other:    Skin: [   ]intact,   [ x  ] other:    surgical site of L hip dry                                                             Neurological Examination:  Cognitive: [   x ] AAO x 3,   [    ]  other:                                                                      Attention:  [  x  ] intact,   [    ]  other:                            Memory: [ x   ] intact,    [    ]  other:     Mood/Affect: [  x  ] wnl,    [    ]  other:                                                                             Communication: [ x   ]Fluent, no dysarthria, following commands:  [    ] other:   CN II - XII:  [ x   ] intact,  [    ] other:                                                                                        Motor:   RIGHT UE: [x   ] WNL,  [   ] other: 5/5 throughout  LEFT    UE: [x   ] WNL,  [   ] other: 5/5 throughout  RIGHT LE: [ x  ] WNL,  [   ] other: 5/5 throughout  LEFT    LE: [   ] WNL,  [  x ] other: 2+/5 HF, unable to assess KF/KE due to pain, 5/5 DF/PF    Tone: [ x   ] wnl,   [    ]  other:  DTRs: [ x  ]symmetric 3+, [   ] other:  Coordination:   [  x  ] intact,   [    ] other:  FTN intact BUE, unable to conduct HTS due to lack of ROM and pain. proprioception intact in BLE                                                                         Sensory: [  x  ] Intact to light touch and proprioception      MEDICATIONS  (STANDING):  acetaminophen     Tablet .. 1000 milliGRAM(s) Oral every 8 hours  chlorhexidine 2% Cloths 1 Application(s) Topical <User Schedule>  enoxaparin Injectable 40 milliGRAM(s) SubCutaneous every 24 hours  folic acid 1 milliGRAM(s) Oral <User Schedule>  ibuprofen  Tablet. 600 milliGRAM(s) Oral <User Schedule>  methotrexate 15 milliGRAM(s) Oral <User Schedule>  pantoprazole    Tablet 40 milliGRAM(s) Oral before breakfast    MEDICATIONS  (PRN):  aluminum hydroxide/magnesium hydroxide/simethicone Suspension 30 milliLiter(s) Oral every 4 hours PRN Dyspepsia  magnesium citrate Oral Solution 296 milliLiter(s) Oral once PRN Constipation  melatonin 3 milliGRAM(s) Oral at bedtime PRN Insomnia  oxyCODONE    IR 5 milliGRAM(s) Oral every 4 hours PRN moderate to severe pain (4 - 10/10)  polyethylene glycol 3350 17 Gram(s) Oral daily PRN Constipation  senna 2 Tablet(s) Oral daily PRN Constipation      RECENT LABS/IMAGING                        13.4   5.79  )-----------( 175      ( 28 Aug 2023 08:20 )             41.9     08-28    144  |  110  |  19  ----------------------------<  97  4.2   |  22  |  0.9    Ca    9.7      28 Aug 2023 08:20  Mg     2.1     08-28    TPro  6.6  /  Alb  4.3  /  TBili  0.3  /  DBili  x   /  AST  34  /  ALT  40  /  AlkPhos  107  08-28    TPro  6.1  /  Alb  4.0  /  TBili  0.5  /  DBili  x   /  AST  38  /  ALT  54<H>  /  AlkPhos  99  08-21    < from: Xray Hip 2-3 Views, Left (08.22.23 @ 13:06) >  The patient is again status post percutaneous screw fixation of a left   femoral neck fracture. The hardware is intact. Fracture alignment is not   appreciably changed. Skin staples are again present.    No acute fracture is seen. There is no dislocation. The patient is again   status post right hip hemiarthroplasty, without evidence of complication.    < end of copied text >     Patient is a 78y old  Female who presents with a chief complaint of Acute Rehabilitation of L femoral neck fracture s/p percutaneous pinning on 8/14/23 (18 Aug 2023 12:42)      HPI:  78F w/PMHx of RA on MTX, Hx of bladder CA s/p TURBT by Dr. 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  Patient reports was crouched and unloading her clothes washer when she tripped and fell over to her left side.  Patient experienced a large amount of pain and was unable to get up.  Patient was on floor approx 30m before she was found by a family member.  They were unable to get the patient up and activated EMS for transport to ED.  Patient with complaints of left hip pain, unable to straighten it or bear weight.  She also has a recent history of 'dizziness', for which she had an extensive outpatient w/u, and has improved after Vestibular Therapy.     Pt found to have a L femoral neck fracture and on 8/14/23 patient went to OR and underwent a closed reduction and percutaneous pinning without any complications.   Her course was complicated by significant elevations in her Transaminase levels, now resolving. She also developed Thrombocytopenia which is stable.     Current level of function of patient prior to admission to rehab with PT on 8/18 reveals Katheryn bed mobility, CGA/ Katheryn transfers, ambulation CG WBAT LLE RW 40ft. With OT on 8/18/23, bathing Katheryn, supervision UBD, modA LBD, toileting modA, supervision grooming, supervision eating    The patient was evaluated by the PM&R team once medically stable. The patient was found to have functional limitation in terms of muscle strength, endurance, physical mobility, and ability to carry out activities of daily living (self care, transfers, and ambulation). The patient was started on a course of bedside therapy, the pt is motivated and able to start 3 hours of therapy  daily for 6-7 days a week. the patient was deemed to be a good candidate for admission for acute inpatient rehab. The patient was admitted to acute inpatient rehab on 8/18/23.   (18 Aug 2023 12:42)    TODAY'S SUBJECTIVE & REVIEW OF SYMPTOMS:  Pt seen and examined at bedside.  No overnight events.  No new complaints.  Left hip pain stable.    Voiding urine/stool spontaneously.    Tolerating therapies well.   VS reviewed.  labs reviewed.    Current level of functioning  Touch assist with  bed mobility; touch assist with transfers; ambulates 75ft x2 using RW with touch assist.      Constitutional:    [  x ] WNL           [   ] poor appetite   [   ] insomnia   [   ] tired   Cardio:                [ x  ] WNL           [   ] CP   [   ] LOUIS   [   ] palpitations               Resp:                   [x   ] WNL           [   ] SOB   [   ] cough   [   ] wheezing   GI:                        [   ] WNL           [ x  ] constipation   [   ] diarrhea   [   ] abdominal pain   [   ] nausea   [   ] emesis                                :                      [x   ] WNL           [   ] MCLEAN  [   ] dysuria   [   ] difficulty voiding             Endo:                   [x   ] WNL          [   ] polyuria   [   ] temperature intolerance                 Skin:                     [   ] WNL          [   ] pain   [x   ] wound, surgical site dry  [   ] rash   MSK:                    [   ] WNL          [x   ] muscle pain   [  x ] joint pain/ stiffness L hip  [   ] muscle tenderness     Neuro:                 [x   ] WNL          [   ] HA   [   ] change in vision   [   ] tremor   [   ] weakness   [   ]dysphagia              Cognitive:           [x   ] WNL           [   ]confusion      Psych:                  [x   ] WNL           [   ] hallucinations   [   ]agitation   [   ] delusion   [   ]depression      PHYSICAL EXAM  Vital Signs (24 Hrs):  T(C): 36.2 (08-29-23 @ 05:43), Max: 36.7 (08-28-23 @ 20:27)  HR: 65 (08-29-23 @ 05:43) (64 - 77)  BP: 135/63 (08-29-23 @ 05:43) (120/59 - 135/63)  RR: 20 (08-29-23 @ 05:43) (18 - 20)      General:[  x ] NAD, Resting Comfortable,   [   ] other:                                HEENT: [ x  ] NC/AT, EOMI, PERRL , Normal Conjunctivae,   [   ] other:  Cardio: [x  ] RRR, no murmur,   [   ] other:                              Pulm: [x   ] No Respiratory Distress,  Lungs CTAB,   [   ] other:                       Abdomen: [ x  ]ND/NT, Soft,   [   ] other:    : [ x  ] NO MCLEAN CATHETER, [   ] MCLEAN CATHETER- no meatal tear, no discharge, [   ] other:                                            MSK: [   ] No joint swelling, Full ROM,   [  x ] other: Mild boutonniere deformity present in RUE thumb.  Reduced ROM of L hip and knee 2/2 to pain and recent surgery. otherwise all other extremities full ROM                                      Ext: [  x ]No C/C/E, No calf tenderness,   [   ]other:    Skin: [   ]intact,   [ x  ] other:    surgical site of L hip dry                                                             Neurological Examination:  Cognitive: [   x ] AAO x 3,   [    ]  other:                                                                      Attention:  [  x  ] intact,   [    ]  other:                            Memory: [ x   ] intact,    [    ]  other:     Mood/Affect: [  x  ] wnl,    [    ]  other:                                                                             Communication: [ x   ]Fluent, no dysarthria, following commands:  [    ] other:   CN II - XII:  [ x   ] intact,  [    ] other:                                                                                        Motor:   RIGHT UE: [x   ] WNL,  [   ] other: 5/5 throughout  LEFT    UE: [x   ] WNL,  [   ] other: 5/5 throughout  RIGHT LE: [ x  ] WNL,  [   ] other: 5/5 throughout  LEFT    LE: [   ] WNL,  [  x ] other: 2+/5 HF, unable to assess KF/KE due to pain, 5/5 DF/PF    Tone: [ x   ] wnl,   [    ]  other:  DTRs: [ x  ]symmetric 3+, [   ] other:  Coordination:   [  x  ] intact,   [    ] other:  FTN intact BUE, unable to conduct HTS due to lack of ROM and pain. proprioception intact in BLE                                                                         Sensory: [  x  ] Intact to light touch and proprioception      MEDICATIONS  (STANDING):  acetaminophen     Tablet .. 1000 milliGRAM(s) Oral every 8 hours  chlorhexidine 2% Cloths 1 Application(s) Topical <User Schedule>  enoxaparin Injectable 40 milliGRAM(s) SubCutaneous every 24 hours  folic acid 1 milliGRAM(s) Oral <User Schedule>  ibuprofen  Tablet. 600 milliGRAM(s) Oral <User Schedule>  methotrexate 15 milliGRAM(s) Oral <User Schedule>  pantoprazole    Tablet 40 milliGRAM(s) Oral before breakfast    MEDICATIONS  (PRN):  aluminum hydroxide/magnesium hydroxide/simethicone Suspension 30 milliLiter(s) Oral every 4 hours PRN Dyspepsia  magnesium citrate Oral Solution 296 milliLiter(s) Oral once PRN Constipation  melatonin 3 milliGRAM(s) Oral at bedtime PRN Insomnia  oxyCODONE    IR 5 milliGRAM(s) Oral every 4 hours PRN moderate to severe pain (4 - 10/10)  polyethylene glycol 3350 17 Gram(s) Oral daily PRN Constipation  senna 2 Tablet(s) Oral daily PRN Constipation      RECENT LABS/IMAGING                        13.4   5.79  )-----------( 175      ( 28 Aug 2023 08:20 )             41.9     08-28    144  |  110  |  19  ----------------------------<  97  4.2   |  22  |  0.9    Ca    9.7      28 Aug 2023 08:20  Mg     2.1     08-28    TPro  6.6  /  Alb  4.3  /  TBili  0.3  /  DBili  x   /  AST  34  /  ALT  40  /  AlkPhos  107  08-28    TPro  6.1  /  Alb  4.0  /  TBili  0.5  /  DBili  x   /  AST  38  /  ALT  54<H>  /  AlkPhos  99  08-21    < from: Xray Hip 2-3 Views, Left (08.22.23 @ 13:06) >  The patient is again status post percutaneous screw fixation of a left   femoral neck fracture. The hardware is intact. Fracture alignment is not   appreciably changed. Skin staples are again present.    No acute fracture is seen. There is no dislocation. The patient is again   status post right hip hemiarthroplasty, without evidence of complication.    < end of copied text >

## 2023-08-30 ENCOUNTER — TRANSCRIPTION ENCOUNTER (OUTPATIENT)
Age: 78
End: 2023-08-30

## 2023-08-30 RX ORDER — OXYCODONE HYDROCHLORIDE 5 MG/1
1 TABLET ORAL
Qty: 28 | Refills: 0
Start: 2023-08-30 | End: 2023-09-05

## 2023-08-30 RX ORDER — ACETAMINOPHEN 500 MG
1 TABLET ORAL
Qty: 0 | Refills: 0 | DISCHARGE
Start: 2023-08-30

## 2023-08-30 RX ORDER — METHOTREXATE 2.5 MG/1
6 TABLET ORAL
Qty: 26 | Refills: 0 | DISCHARGE
Start: 2023-08-30 | End: 2023-09-28

## 2023-08-30 RX ADMIN — Medication 600 MILLIGRAM(S): at 09:00

## 2023-08-30 RX ADMIN — OXYCODONE HYDROCHLORIDE 5 MILLIGRAM(S): 5 TABLET ORAL at 20:35

## 2023-08-30 RX ADMIN — OXYCODONE HYDROCHLORIDE 5 MILLIGRAM(S): 5 TABLET ORAL at 09:00

## 2023-08-30 RX ADMIN — Medication 600 MILLIGRAM(S): at 08:09

## 2023-08-30 RX ADMIN — Medication 1000 MILLIGRAM(S): at 06:24

## 2023-08-30 RX ADMIN — Medication 1000 MILLIGRAM(S): at 21:51

## 2023-08-30 RX ADMIN — Medication 1000 MILLIGRAM(S): at 12:46

## 2023-08-30 RX ADMIN — CHLORHEXIDINE GLUCONATE 1 APPLICATION(S): 213 SOLUTION TOPICAL at 06:23

## 2023-08-30 RX ADMIN — ENOXAPARIN SODIUM 40 MILLIGRAM(S): 100 INJECTION SUBCUTANEOUS at 06:24

## 2023-08-30 RX ADMIN — PANTOPRAZOLE SODIUM 40 MILLIGRAM(S): 20 TABLET, DELAYED RELEASE ORAL at 06:24

## 2023-08-30 RX ADMIN — Medication 1000 MILLIGRAM(S): at 07:34

## 2023-08-30 RX ADMIN — Medication 1000 MILLIGRAM(S): at 22:36

## 2023-08-30 RX ADMIN — Medication 1 MILLIGRAM(S): at 08:09

## 2023-08-30 RX ADMIN — OXYCODONE HYDROCHLORIDE 5 MILLIGRAM(S): 5 TABLET ORAL at 19:50

## 2023-08-30 RX ADMIN — Medication 1000 MILLIGRAM(S): at 13:20

## 2023-08-30 RX ADMIN — OXYCODONE HYDROCHLORIDE 5 MILLIGRAM(S): 5 TABLET ORAL at 08:09

## 2023-08-30 NOTE — DISCHARGE NOTE PROVIDER - CARE PROVIDER_API CALL
Wilfredo Carolina  Internal Medicine  [address] [place] [address]  Phone: [phone]  Fax: [phone]  Follow Up Time:     Vu Larios  Orthopaedic Surgery  [address] [place] [address]  Phone: [phone]  Fax: [phone]  Follow Up Time:     your Rheumatologist,   Phone: (   )    -  Fax: (   )    -  Follow Up Time:    Wilfredo Carolina  Internal Medicine  [address] [place] [address]  Phone: [phone]  Fax: [phone]  Follow Up Time:     Vu Larios  Orthopaedic Surgery  [address] [place] [address]  Phone: [phone]  Fax: [phone]  Follow Up Time:     your Rheumatologist,   Phone: (   )    -  Fax: (   )    -  Follow Up Time:     Trey Best  Urology  [address] [place] [address]  Phone: [phone]  Fax: [phone]  Follow Up Time:     Daniele Roblero  Neurology  [address] [place] [address]  Phone: [phone]  Fax: [phone]  Follow Up Time:    Wilfredo Carolina  Internal Medicine  [address] [place] [address]  Phone: [phone]  Fax: [phone]  Follow Up Time:     Vu Larios  Orthopaedic Surgery  [address] [place] [address]  Phone: [phone]  Fax: [phone]  Follow Up Time:     Trey Best  Urology  [address] [place] [address]  Phone: [phone]  Fax: [phone]  Follow Up Time:     Daniele Roblero  Neurology  [address] [place] [address]  Phone: [phone]  Fax: [phone]  Follow Up Time:     Maru Roman  Rheumatology  [address] [facility], [address]  [place] [address]  Phone: [phone]  Fax: [phone]  Established Patient  Follow Up Time:

## 2023-08-30 NOTE — DISCHARGE NOTE PROVIDER - PROVIDER TOKENS
PROVIDER:[TOKEN:[[id number]]],PROVIDER:[TOKEN:[[id number]]],FREE:[LAST:[your Rheumatologist],PHONE:[(   )    -],FAX:[(   )    -]] PROVIDER:[TOKEN:[[id number]]],PROVIDER:[TOKEN:[[id number]]],FREE:[LAST:[your Rheumatologist],PHONE:[(   )    -],FAX:[(   )    -]],PROVIDER:[TOKEN:[[id number]]],PROVIDER:[TOKEN:[[id number]]] PROVIDER:[TOKEN:[[id number]]],PROVIDER:[TOKEN:[[id number]]],PROVIDER:[TOKEN:[[id number]]],PROVIDER:[TOKEN:[[id number]]],PROVIDER:[TOKEN:[[id number]],ESTABLISHEDPATIENT:[T]]

## 2023-08-30 NOTE — DISCHARGE NOTE PROVIDER - CARE PROVIDERS DIRECT ADDRESSES
,[email or web address],DirectAddress_Unknown ,[email or web address],DirectAddress_Unknown,DirectAddress_Unknown,DirectAddress_Unknown

## 2023-08-30 NOTE — DISCHARGE NOTE PROVIDER - NSDCMRMEDTOKEN_GEN_ALL_CORE_FT
acetaminophen 500 mg oral tablet: 1 tab(s) orally every 6 hours as needed for  mild pain  aspirin 81 mg oral capsule: 1 cap(s) orally 2 times a day  folic acid 1 mg oral tablet: 1 tab(s) orally once a day continue 6 days a week  oxyCODONE 5 mg oral tablet: 1 tab(s) orally 4 times a day as needed for  moderate pain MDD: 4  pantoprazole 40 mg oral delayed release tablet: 1 tab(s) orally once a day (before a meal)   acetaminophen 500 mg oral tablet: 1 tab(s) orally every 6 hours as needed for  mild pain or fever  aspirin 81 mg oral capsule: 1 cap(s) orally 2 times a day  folic acid 1 mg oral tablet: 1 tab(s) orally once a day , except not on Thursdays  methotrexate 2.5 mg oral tablet: 6 tab(s) orally once a week Take it every Thursday MDD: 6 tab  Narcan 4 mg/0.1 mL nasal spray: 4 milligram(s) intranasally prn as needed for known/suspected narcotic/opioid overdose , may be used twice, and CALL 911 IMMEDIATELY  oxyCODONE 5 mg oral tablet: 1 tab(s) orally 4 times a day as needed for  moderate pain MDD: 4  pantoprazole 40 mg oral delayed release tablet: 1 tab(s) orally once a day (before a meal)

## 2023-08-30 NOTE — DISCHARGE NOTE PROVIDER - HOSPITAL COURSE
· Subjective and Objective:   Patient is a 78y old  Female who presents with a chief complaint of Acute Rehabilitation of L femoral neck fracture s/p percutaneous pinning on 8/14/23 (18 Aug 2023 12:42)  HPI:  78F w/PMHx of RA on MTX, Hx of bladder CA s/p TURBT by Dr. 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  Patient reports was crouched and unloading her clothes washer when she tripped and fell over to her left side.  Patient experienced a large amount of pain and was unable to get up.  Patient was on floor approx 30m before she was found by a family member.  They were unable to get the patient up and activated EMS for transport to ED.  Patient with complaints of left hip pain, unable to straighten it or bear weight.  She also has a recent history of 'dizziness', for which she had an extensive outpatient w/u, and has improved after Vestibular Therapy.     Pt found to have a L femoral neck fracture and on 8/14/23 patient went to OR and underwent a closed reduction and percutaneous pinning without any complications.   Her course was complicated by significant elevations in her Transaminase levels, now resolving. She also developed Thrombocytopenia which is stable. Current level of function of patient prior to admission to rehab with PT on 8/18 reveals Katheryn bed mobility, CGA/ Katheryn transfers, ambulation CG WBAT LLE RW 40ft. With OT on 8/18/23, bathing Katheryn, supervision UBD, modA LBD, toileting modA, supervision grooming, supervision eating  The patient was admitted to acute inpatient rehab on 8/18/23.#Rehab of Fall s/p L femoral neck fracture s/p closed reduction and percutaneous pinning of left hip on 8/14.  - Patient requires a full rehab program of 3 hours a day of PT, OT  -orthopedics following  - patient c/o ongoing left hip/ groin pain with ROM/ walking.  F/U x-ray with no displacement of pins  - Patient requesting Ibuprofen 600mg daily which she takes at home. Ordered  - c/w ibuprofen 600 mg po daily with breakfast  - c/w Oxycodone 5 mg q4hr prn mod pain; wean off narcotics as appropriate  - Acetominophen changed to 1000 mg q 8 hrs. standing  - Weight bearing: WBAT LLE with walker  - DC tomorrow    #s/p Normocytic anemia; likely 2/2 postop blood loss.  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  - c/w miralax, senna  - s/p bisacodyl  - had BMs. Monitor on current meds    #Misc  -GI/Bowel Mgmt: as above, protonix 40mg qAM  - Melatonin 3md qhs   -Skin: monitor incision    - Diet: Regular diet        Precautions / PROPHYLAXIS:      - Falls, safety    - Ortho: Weight bearing status: WBAT LLE with walker      - DVT prophylaxis: SQH 5000u q8hr ; on d/c aspirin 81 mg BID     · Subjective and Objective:   Patient is a 78y old  Female who presents with a chief complaint of Acute Rehabilitation of L femoral neck fracture s/p percutaneous pinning on 8/14/23 (18 Aug 2023 12:42)  HPI:  78F w/PMHx of RA on MTX, Hx of bladder CA s/p TURBT by Dr. 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  Patient reports was crouched and unloading her clothes washer when she tripped and fell over to her left side.  Patient experienced a large amount of pain and was unable to get up.  Patient was on floor approx 30m before she was found by a family member.  They were unable to get the patient up and activated EMS for transport to ED.  Patient with complaints of left hip pain, unable to straighten it or bear weight.  She also has a recent history of 'dizziness', for which she had an extensive outpatient w/u, and has improved after Vestibular Therapy.     Pt found to have a L femoral neck fracture and on 8/14/23 patient went to OR and underwent a closed reduction and percutaneous pinning without any complications.   Her course was complicated by significant elevations in her Transaminase levels, now resolving. She also developed Thrombocytopenia which is stable. Current level of function of patient prior to admission to rehab with PT on 8/18 reveals Katheryn bed mobility, CGA/ Katheryn transfers, ambulation CG WBAT LLE RW 40ft. With OT on 8/18/23, bathing Katheryn, supervision UBD, modA LBD, toileting modA, supervision grooming, supervision eating  The patient was admitted to acute inpatient rehab on 8/18/23.#Rehab of Fall s/p L femoral neck fracture s/p closed reduction and percutaneous pinning of left hip on 8/14.  - Patient requires a full rehab program of 3 hours a day of PT, OT  -orthopedics following  - patient c/o ongoing left hip/ groin pain with ROM/ walking.  F/U x-ray with no displacement of pins  - Patient requesting Ibuprofen 600mg daily which she takes at home. Ordered  - c/w ibuprofen 600 mg po daily with breakfast  - c/w Oxycodone 5 mg q4hr prn mod pain; wean off narcotics as appropriate  - Acetominophen changed to 1000 mg q 8 hrs. standing  - Weight bearing: WBAT LLE with walker    #s/p Normocytic anemia; likely 2/2 postop blood loss.  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  - c/w miralax, senna  - s/p bisacodyl  - had BMs. Monitor on current meds    #Misc  -GI/Bowel Mgmt: as above, protonix 40mg qAM  - Melatonin 3mg qhs   -Skin: monitor incision -- healed well, staples were d/c'd on 8/29/23    - Diet: Regular diet    Precautions / PROPHYLAXIS:      - Falls, safety    - Ortho: Weight bearing status: WBAT LLE with walker      - DVT prophylaxis: SQH 5000u q8hr ; on d/c aspirin 81 mg BID    The patient is being discharged home with home care on 8/31/23. She will continue to follow up with her PCP Dr. Carmona and with Ortho Dr. Ervin, as well as with her   Rheumatologist for her RA and with Neurology for the aneurysm, also with  Dr. Best for hx of bladder cancer.     · Subjective and Objective:   Patient is a 78y old  Female who presents with a chief complaint of Acute Rehabilitation of L femoral neck fracture s/p percutaneous pinning on 8/14/23 (18 Aug 2023 12:42)  HPI:  78F w/PMHx of RA on MTX, Hx of bladder CA s/p TURBT by Dr. 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  Patient reports was crouched and unloading her clothes washer when she tripped and fell over to her left side.  Patient experienced a large amount of pain and was unable to get up.  Patient was on floor approx 30m before she was found by a family member.  They were unable to get the patient up and activated EMS for transport to ED.  Patient with complaints of left hip pain, unable to straighten it or bear weight.  She also has a recent history of 'dizziness', for which she had an extensive outpatient w/u, and has improved after Vestibular Therapy.     Pt found to have a L femoral neck fracture and on 8/14/23 patient went to OR and underwent a closed reduction and percutaneous pinning without any complications.   Her course was complicated by significant elevations in her Transaminase levels, now resolving. She also developed Thrombocytopenia which is stable. Current level of function of patient prior to admission to rehab with PT on 8/18 reveals Katheryn bed mobility, CGA/ Katheryn transfers, ambulation CG WBAT LLE RW 40ft. With OT on 8/18/23, bathing Katheryn, supervision UBD, modA LBD, toileting modA, supervision grooming, supervision eating  The patient was admitted to acute inpatient rehab on 8/18/23.#Rehab of Fall s/p L femoral neck fracture s/p closed reduction and percutaneous pinning of left hip on 8/14.  - Patient requires a full rehab program of 3 hours a day of PT, OT  -orthopedics following  - patient c/o ongoing left hip/ groin pain with ROM/ walking.  F/U x-ray with no displacement of pins  - Patient requesting Ibuprofen 600mg daily which she takes at home. Ordered  - c/w ibuprofen 600 mg po daily with breakfast  - c/w Oxycodone 5 mg q4hr prn mod pain; wean off narcotics as appropriate  - Acetominophen changed to 1000 mg q 8 hrs. standing  - Weight bearing: WBAT LLE with walker    #s/p Normocytic anemia; likely 2/2 postop blood loss.  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  - c/w miralax, senna  - s/p bisacodyl  - had BMs. Monitor on current meds    #Misc  -GI/Bowel Mgmt: as above, protonix 40mg qAM  - Melatonin 3mg qhs   -Skin: monitor incision -- healed well, staples were d/c'd on 8/29/23    - Diet: Regular diet    Precautions / PROPHYLAXIS:      - Falls, safety    - Ortho: Weight bearing status: WBAT LLE with walker      - DVT prophylaxis: SQH 5000u q8hr ; on d/c aspirin 81 mg BID    The patient is being discharged home with home care on 8/31/23. She will continue to follow up with her PCP Dr. Carmona and with Ortho Dr. Ervin, as well as with her   Rheumatologist Dr Maru Roman for her RA and with Neurology Dr Roblero (or at [facility]) for the aneurysm, also with  Dr. Best for hx of bladder cancer.

## 2023-08-30 NOTE — DISCHARGE NOTE PROVIDER - NSDCFUADDINST_GEN_ALL_CORE_FT
* Please carry these paper work with you for all your medical follow ups** ** Please carry this paper work with you for all your medical follow ups and show it to all your health care providers and caregivers **

## 2023-08-30 NOTE — PROGRESS NOTE ADULT - ASSESSMENT
ASSESSMENT/PLAN  78F w/PMHx of RA on MTX, Hx of bladder CA s/p TURBT by Dr. 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  Patient reports was crouched and unloading her clothes washer when she tripped and fell over to her left side.  Pt with left femoral neck fracture s/p closed reduction and percutaneous pinning of left hip on 8/14 and admitted to acute rehab on 8/18.    #Rehab of Fall s/p L femoral neck fracture s/p closed reduction and percutaneous pinning of left hip on 8/14.  - Patient requires a full rehab program of 3 hours a day of PT, OT  -orthopedics following  - patient c/o ongoing left hip/ groin pain with ROM/ walking.  F/U x-ray with no displacement of pins  - Patient requesting Ibuprofen 600mg daily which she takes at home. Ordered  - c/w ibuprofen 600 mg po daily with breakfast  - c/w Oxycodone 5 mg q4hr prn mod pain; wean off narcotics as appropriate  - Acetominophen changed to 1000 mg q 8 hrs. standing  - Weight bearing: WBAT LLE with walker  - continue full rehab program  - DC tomorrow    #s/p Normocytic anemia; likely 2/2 postop blood loss.  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 after discharge  - avoid high BPs    #Constipation (resolving)  Not associated with abdominal pain.  - c/w miralax, senna  - s/p bisacodyl  - had BMs. Monitor on current meds    #Misc  -GI/Bowel Mgmt: as above, protonix 40mg qAM  - Melatonin 3md qhs   -Skin: monitor incision    - Diet: Regular diet        Precautions / PROPHYLAXIS:      - Falls, safety    - Ortho: Weight bearing status: WBAT LLE with walker      - DVT prophylaxis: SQH 5000u q8hr ; on d/c aspirin 81 mg BID   ASSESSMENT/PLAN  78F w/PMHx of RA on MTX, Hx of bladder CA s/p TURBT by Dr. 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  Patient reports was crouched and unloading her clothes washer when she tripped and fell over to her left side.  Pt with left femoral neck fracture s/p closed reduction and percutaneous pinning of left hip on 8/14 and admitted to acute rehab on 8/18.    #Rehab of Fall s/p L femoral neck fracture s/p closed reduction and percutaneous pinning of left hip on 8/14.  - Patient requires a full rehab program of 3 hours a day of PT, OT  -orthopedics following  - patient c/o ongoing left hip/ groin pain with ROM/ walking.  F/U x-ray with no displacement of pins  - Patient requesting Ibuprofen 600mg daily which she takes at home. Ordered  - c/w ibuprofen 600 mg po daily with breakfast  - c/w Oxycodone 5 mg q4hr prn mod pain; wean off narcotics as appropriate  - Acetominophen changed to 1000 mg q 8 hrs. standing  - Weight bearing: WBAT LLE with walker  - DC tomorrow    #s/p Normocytic anemia; likely 2/2 postop blood loss.  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 after discharge  - avoid high BPs    #Constipation (resolving)  Not associated with abdominal pain.  - c/w miralax, senna  - s/p bisacodyl  - had BMs. Monitor on current meds    #Misc  -GI/Bowel Mgmt: as above, protonix 40mg qAM  - Melatonin 3md qhs   -Skin: monitor incision    - Diet: Regular diet        Precautions / PROPHYLAXIS:      - Falls, safety    - Ortho: Weight bearing status: WBAT LLE with walker      - DVT prophylaxis: SQH 5000u q8hr ; on d/c aspirin 81 mg BID

## 2023-08-30 NOTE — DISCHARGE NOTE PROVIDER - NSDCDCMDCOMP_GEN_ALL_CORE
Anesthesia Transfer of Care Note    Patient: Magda Flaherty    Procedure(s) Performed: Procedure(s) (LRB):  MYRINGOTOMY WITH INSERTION OF PE TUBES (Bilateral)    Patient location: PACU    Anesthesia Type: general    Transport from OR: Transported from OR on 6-10 L/min O2 by face mask with adequate spontaneous ventilation    Post pain: adequate analgesia    Post assessment: no apparent anesthetic complications    Post vital signs: stable    Level of consciousness: awake    Nausea/Vomiting: no nausea/vomiting    Complications: none    Transfer of care protocol was followed      Last vitals:   Visit Vitals  BP (!) 77/37 (BP Location: Right arm, Patient Position: Lying)   Pulse (!) 131   Temp 37.1 °C (98.8 °F) (Temporal)   Resp 24   Wt 11.3 kg (24 lb 14.6 oz)   SpO2 97%     
This document is complete and the patient is ready for discharge.

## 2023-08-30 NOTE — DISCHARGE NOTE PROVIDER - NPI NUMBER (FOR SYSADMIN USE ONLY) :
[[id number]],[[id number]] [[id number]],[[id number]],[UNKNOWN],[[id number]],[[id number]] [[id number]]

## 2023-08-30 NOTE — DISCHARGE NOTE PROVIDER - NSDCCPCAREPLAN_GEN_ALL_CORE_FT
PRINCIPAL DISCHARGE DIAGNOSIS  Diagnosis: Hip fracture  Assessment and Plan of Treatment: You had left  hip fracture and underwent OPen reduction and internal fixation , You are allowed to ambulate with wear bearing as tolerated , you may take pain meds oxycodone 1 tab as needed every 4 to 6 hours , An RX was sent to your pharmacy , You will be on baby aspirin ( ASA ) 81 mg every 12 hours for 4 weeks to prevent blood clots, While you are on this please avoid taking ibuproffen , pain control with tylenlol and oxycodone as needed , continue  physical therapy at home  too . Jania videsow up with your ortho doctor in 2 weeks      SECONDARY DISCHARGE DIAGNOSES  Diagnosis: H/O rheumatoid arthritis  Assessment and Plan of Treatment: You were on methotrexate  for this condition 15 mg every thursday . please continue this and follow up with your Rheumatologist in 2 to 4 weeks .     PRINCIPAL DISCHARGE DIAGNOSIS  Diagnosis: Hip fracture  Assessment and Plan of Treatment: You had left  hip fracture and underwent OPen reduction and internal fixation , You are allowed to ambulate with wear bearing as tolerated , you may take pain meds oxycodone 1 tab as needed every 4 to 6 hours , An RX was sent to your pharmacy , You will be on baby aspirin ( ASA ) 81 mg every 12 hours for 4 weeks to prevent blood clots, While you are on this please avoid taking ibuproffen , pain control with tylenlol and oxycodone as needed , continue  physical therapy at home  too . Grace allenollow up with your ortho doctor in 2 weeks      SECONDARY DISCHARGE DIAGNOSES  Diagnosis: H/O rheumatoid arthritis  Assessment and Plan of Treatment: You were on methotrexate  for this condition 15 mg every thursday . please continue this and follow up with your Rheumatologist in 2 to 4 weeks .    Diagnosis: H/O aortic aneurysm  Assessment and Plan of Treatment: Incidental finding of 5 .5 mm aneyrysm in brain artery , grace allenollow up with a neurologist  periodically . dr Roblero is  a neurologist  grace carrasco an appointment for follow up    Diagnosis: Bladder cancer  Assessment and Plan of Treatment: There is a history of bladder cancer and operative procedure TURP  done  in the past with dr Best. please follow up with him as per advise , continue meds as advised .     PRINCIPAL DISCHARGE DIAGNOSIS  Diagnosis: Hip fracture  Assessment and Plan of Treatment: You had left  hip fracture and underwent Open reduction and internal fixation , You are allowed to ambulate with the rolling walker and supervision / assistance for your safety; you can wear bear as tolerated on your lower extremities. You may take pain meds oxycodone 1 tab as needed every 4 to 6 hours , A prescription was sent to your pharmacy , You will be on baby aspirin ( ASA ) 81 mg every 12 hours for 4 weeks to prevent blood clots, While you are taking aspirin please avoid taking ibuprofen , take tylenol and oxycodone as needed , continue  physical therapy at home  too . Please follow up with your ortho doctor, Dr. Justin Larios, in 2 weeks, as well as with your Primary Care Provider (PCP) Dr. Carmona, in one to two weeks.      SECONDARY DISCHARGE DIAGNOSES  Diagnosis: H/O rheumatoid arthritis  Assessment and Plan of Treatment: You are taking  methotrexate  for this condition 15 mg every thursday . please continue this medication, as well as folic acid, and follow up with your Rheumatologist in 2 to 4 weeks .    Diagnosis: Bladder cancer  Assessment and Plan of Treatment: There is a history of bladder cancer and operative procedure TURP  done  in the past with dr Best. please follow up with him as per advise , continue meds as advised .    Diagnosis: Brain aneurysm  Assessment and Plan of Treatment: An aneurysm 5.5mm was found in an artery in your brain and you will need to follow up with either our Neurologist, Dr. Roblero, or with your own Neurologist; follow up CT scans should be done periodically to monitor the size and to evaluate if/when any intervention may be necessary.     PRINCIPAL DISCHARGE DIAGNOSIS  Diagnosis: Hip fracture  Assessment and Plan of Treatment: You had left  hip fracture and underwent Open reduction and internal fixation on 8/14/23. You are allowed to ambulate using the rolling walker with supervision / assistance for your safety; you can weight- bear as tolerated on your lower extremities. You may take pain meds oxycodone 1 tab as needed every 4 to 6 hours , A prescription was sent to your pharmacy , You will be taking baby aspirin ( ASA ) 81 mg every 12 hours for 4 weeks to prevent blood clots, While you are taking aspirin please avoid taking ibuprofen; you can take tylenol and oxycodone as needed for pain. Continue  physical therapy at home  too . Please follow up with your orthopedic doctor, Dr. Justin Larios, in 2 weeks, as well as with your Primary Care Provider (PCP) Dr. Carmona, in one to two weeks.      SECONDARY DISCHARGE DIAGNOSES  Diagnosis: H/O rheumatoid arthritis  Assessment and Plan of Treatment: You are taking  methotrexate  for this condition 15 mg every thursday . please continue this medication, as well as folic acid, and follow up with your Rheumatologist Dr. Maru Roman in 2 to 4 weeks .    Diagnosis: Bladder cancer  Assessment and Plan of Treatment: There is a history of bladder cancer and operative procedure TURBT  done  in the past with Dr Best. please follow up with him as per his recommendation , continue meds as advised .    Diagnosis: Brain aneurysm  Assessment and Plan of Treatment: An aneurysm 5.5mm was found in an artery in your brain and you will need to follow up with either our Neurologist, Dr. Roblero, or with your own Neurologist; follow up CT scans should be done periodically to monitor the size and to evaluate if/when any intervention may be necessary.

## 2023-08-30 NOTE — PROGRESS NOTE ADULT - SUBJECTIVE AND OBJECTIVE BOX
Patient is a 78y old  Female who presents with a chief complaint of Acute Rehabilitation of L femoral neck fracture s/p percutaneous pinning on 8/14/23 (18 Aug 2023 12:42)      HPI:  78F w/PMHx of RA on MTX, Hx of bladder CA s/p TURBT by Dr. 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  Patient reports was crouched and unloading her clothes washer when she tripped and fell over to her left side.  Patient experienced a large amount of pain and was unable to get up.  Patient was on floor approx 30m before she was found by a family member.  They were unable to get the patient up and activated EMS for transport to ED.  Patient with complaints of left hip pain, unable to straighten it or bear weight.  She also has a recent history of 'dizziness', for which she had an extensive outpatient w/u, and has improved after Vestibular Therapy.     Pt found to have a L femoral neck fracture and on 8/14/23 patient went to OR and underwent a closed reduction and percutaneous pinning without any complications.   Her course was complicated by significant elevations in her Transaminase levels, now resolving. She also developed Thrombocytopenia which is stable.     Current level of function of patient prior to admission to rehab with PT on 8/18 reveals Katheryn bed mobility, CGA/ Katheryn transfers, ambulation CG WBAT LLE RW 40ft. With OT on 8/18/23, bathing Katheryn, supervision UBD, modA LBD, toileting modA, supervision grooming, supervision eating    The patient was evaluated by the PM&R team once medically stable. The patient was found to have functional limitation in terms of muscle strength, endurance, physical mobility, and ability to carry out activities of daily living (self care, transfers, and ambulation). The patient was started on a course of bedside therapy, the pt is motivated and able to start 3 hours of therapy  daily for 6-7 days a week. the patient was deemed to be a good candidate for admission for acute inpatient rehab. The patient was admitted to acute inpatient rehab on 8/18/23.   (18 Aug 2023 12:42)    TODAY'S SUBJECTIVE & REVIEW OF SYMPTOMS:  Pt seen and examined at bedside.  No overnight events.  No new complaints.  Left hip pain stable.  Staples removed yesterday-incision is clean, dry and healing well.    Voiding urine/stool spontaneously.    Tolerating therapies well.   VS reviewed.      Current level of functioning  Supervision with  bed mobility and transfers; close supervision for ambulation using RW.      Constitutional:    [  x ] WNL           [   ] poor appetite   [   ] insomnia   [   ] tired   Cardio:                [ x  ] WNL           [   ] CP   [   ] LOUIS   [   ] palpitations               Resp:                   [x   ] WNL           [   ] SOB   [   ] cough   [   ] wheezing   GI:                        [   ] WNL           [ x  ] constipation   [   ] diarrhea   [   ] abdominal pain   [   ] nausea   [   ] emesis                                :                      [x   ] WNL           [   ] MCLEAN  [   ] dysuria   [   ] difficulty voiding             Endo:                   [x   ] WNL          [   ] polyuria   [   ] temperature intolerance                 Skin:                     [   ] WNL          [   ] pain   [x   ] wound, surgical site dry  [   ] rash   MSK:                    [   ] WNL          [x   ] muscle pain   [  x ] joint pain/ stiffness L hip  [   ] muscle tenderness     Neuro:                 [x   ] WNL          [   ] HA   [   ] change in vision   [   ] tremor   [   ] weakness   [   ]dysphagia              Cognitive:           [x   ] WNL           [   ]confusion      Psych:                  [x   ] WNL           [   ] hallucinations   [   ]agitation   [   ] delusion   [   ]depression      PHYSICAL EXAM  Vital Signs (24 Hrs):  T(C): 36.5 (08-30-23 @ 05:00), Max: 36.7 (08-29-23 @ 14:31)  HR: 60 (08-30-23 @ 05:00) (60 - 72)  BP: 131/60 (08-30-23 @ 05:00) (128/60 - 132/60)  RR: 18 (08-30-23 @ 05:00) (18 - 20)  SpO2: --  Wt(kg): --  Daily     Daily     I&O's Summary      General:[  x ] NAD, Resting Comfortable,   [   ] other:                                HEENT: [ x  ] NC/AT, EOMI, PERRL , Normal Conjunctivae,   [   ] other:  Cardio: [x  ] RRR, no murmur,   [   ] other:                              Pulm: [x   ] No Respiratory Distress,  Lungs CTAB,   [   ] other:                       Abdomen: [ x  ]ND/NT, Soft,   [   ] other:    : [ x  ] NO MCLEAN CATHETER, [   ] MCLEAN CATHETER- no meatal tear, no discharge, [   ] other:                                            MSK: [   ] No joint swelling, Full ROM,   [  x ] other: Mild boutonniere deformity present in RUE thumb.  Reduced ROM of L hip and knee 2/2 to pain and recent surgery. otherwise all other extremities full ROM                                      Ext: [  x ]No C/C/E, No calf tenderness,   [   ]other:    Skin: [   ]intact,   [ x  ] other:    surgical site of L hip dry                                                             Neurological Examination:  Cognitive: [   x ] AAO x 3,   [    ]  other:                                                                      Attention:  [  x  ] intact,   [    ]  other:                            Memory: [ x   ] intact,    [    ]  other:     Mood/Affect: [  x  ] wnl,    [    ]  other:                                                                             Communication: [ x   ]Fluent, no dysarthria, following commands:  [    ] other:   CN II - XII:  [ x   ] intact,  [    ] other:                                                                                        Motor:   RIGHT UE: [x   ] WNL,  [   ] other: 5/5 throughout  LEFT    UE: [x   ] WNL,  [   ] other: 5/5 throughout  RIGHT LE: [ x  ] WNL,  [   ] other: 5/5 throughout  LEFT    LE: [   ] WNL,  [  x ] other: 2+/5 HF, unable to assess KF/KE due to pain, 5/5 DF/PF    Tone: [ x   ] wnl,   [    ]  other:  DTRs: [ x  ]symmetric 3+, [   ] other:  Coordination:   [  x  ] intact,   [    ] other:  FTN intact BUE, unable to conduct HTS due to lack of ROM and pain. proprioception intact in BLE                                                                         Sensory: [  x  ] Intact to light touch and proprioception      MEDICATIONS  (STANDING):  acetaminophen     Tablet .. 1000 milliGRAM(s) Oral every 8 hours  chlorhexidine 2% Cloths 1 Application(s) Topical <User Schedule>  enoxaparin Injectable 40 milliGRAM(s) SubCutaneous every 24 hours  folic acid 1 milliGRAM(s) Oral <User Schedule>  ibuprofen  Tablet. 600 milliGRAM(s) Oral <User Schedule>  methotrexate 15 milliGRAM(s) Oral <User Schedule>  pantoprazole    Tablet 40 milliGRAM(s) Oral before breakfast    MEDICATIONS  (PRN):  aluminum hydroxide/magnesium hydroxide/simethicone Suspension 30 milliLiter(s) Oral every 4 hours PRN Dyspepsia  magnesium citrate Oral Solution 296 milliLiter(s) Oral once PRN Constipation  melatonin 3 milliGRAM(s) Oral at bedtime PRN Insomnia  oxyCODONE    IR 5 milliGRAM(s) Oral every 4 hours PRN moderate to severe pain (4 - 10/10)  polyethylene glycol 3350 17 Gram(s) Oral daily PRN Constipation  senna 2 Tablet(s) Oral daily PRN Constipation      RECENT LABS/IMAGING                        13.4   5.79  )-----------( 175      ( 28 Aug 2023 08:20 )             41.9     08-28    144  |  110  |  19  ----------------------------<  97  4.2   |  22  |  0.9    Ca    9.7      28 Aug 2023 08:20  Mg     2.1     08-28    TPro  6.6  /  Alb  4.3  /  TBili  0.3  /  DBili  x   /  AST  34  /  ALT  40  /  AlkPhos  107  08-28    TPro  6.1  /  Alb  4.0  /  TBili  0.5  /  DBili  x   /  AST  38  /  ALT  54<H>  /  AlkPhos  99  08-21    < from: Xray Hip 2-3 Views, Left (08.22.23 @ 13:06) >  The patient is again status post percutaneous screw fixation of a left   femoral neck fracture. The hardware is intact. Fracture alignment is not   appreciably changed. Skin staples are again present.    No acute fracture is seen. There is no dislocation. The patient is again   status post right hip hemiarthroplasty, without evidence of complication.    < end of copied text >     Patient is a 78y old  Female who presents with a chief complaint of Acute Rehabilitation of L femoral neck fracture s/p percutaneous pinning on 8/14/23 (18 Aug 2023 12:42)      HPI:  78F w/PMHx of RA on MTX, Hx of bladder CA s/p TURBT by Dr. 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  Patient reports was crouched and unloading her clothes washer when she tripped and fell over to her left side.  Patient experienced a large amount of pain and was unable to get up.  Patient was on floor approx 30m before she was found by a family member.  They were unable to get the patient up and activated EMS for transport to ED.  Patient with complaints of left hip pain, unable to straighten it or bear weight.  She also has a recent history of 'dizziness', for which she had an extensive outpatient w/u, and has improved after Vestibular Therapy.     Pt found to have a L femoral neck fracture and on 8/14/23 patient went to OR and underwent a closed reduction and percutaneous pinning without any complications.   Her course was complicated by significant elevations in her Transaminase levels, now resolving. She also developed Thrombocytopenia which is stable.     Current level of function of patient prior to admission to rehab with PT on 8/18 reveals Katheryn bed mobility, CGA/ Katheryn transfers, ambulation CG WBAT LLE RW 40ft. With OT on 8/18/23, bathing Katheryn, supervision UBD, modA LBD, toileting modA, supervision grooming, supervision eating    The patient was evaluated by the PM&R team once medically stable. The patient was found to have functional limitation in terms of muscle strength, endurance, physical mobility, and ability to carry out activities of daily living (self care, transfers, and ambulation). The patient was started on a course of bedside therapy, the pt is motivated and able to start 3 hours of therapy  daily for 6-7 days a week. the patient was deemed to be a good candidate for admission for acute inpatient rehab. The patient was admitted to acute inpatient rehab on 8/18/23.   (18 Aug 2023 12:42)    TODAY'S SUBJECTIVE & REVIEW OF SYMPTOMS:  Pt seen and examined at bedside.  No overnight events.  No new complaints.  Left hip pain stable.  Staples removed yesterday-incision is clean, dry and healing well.    Voiding urine/stool spontaneously.    Tolerating therapies well.   VS reviewed.      Current level of functioning  Supervision with  bed mobility and transfers; close supervision for ambulation using RW.      Constitutional:    [  x ] WNL           [   ] poor appetite   [   ] insomnia   [   ] tired   Cardio:                [ x  ] WNL           [   ] CP   [   ] LOUIS   [   ] palpitations               Resp:                   [x   ] WNL           [   ] SOB   [   ] cough   [   ] wheezing   GI:                        [   ] WNL           [ x  ] constipation   [   ] diarrhea   [   ] abdominal pain   [   ] nausea   [   ] emesis                                :                      [x   ] WNL           [   ] MCLEAN  [   ] dysuria   [   ] difficulty voiding             Endo:                   [x   ] WNL          [   ] polyuria   [   ] temperature intolerance                 Skin:                     [   ] WNL          [   ] pain   [x   ] wound, surgical site dry  [   ] rash   MSK:                    [   ] WNL          [x   ] muscle pain   [  x ] joint pain/ stiffness L hip  [   ] muscle tenderness     Neuro:                 [x   ] WNL          [   ] HA   [   ] change in vision   [   ] tremor   [   ] weakness   [   ]dysphagia              Cognitive:           [x   ] WNL           [   ]confusion      Psych:                  [x   ] WNL           [   ] hallucinations   [   ]agitation   [   ] delusion   [   ]depression      PHYSICAL EXAM  Vital Signs (24 Hrs):  T(C): 36.5 (08-30-23 @ 05:00), Max: 36.7 (08-29-23 @ 14:31)  HR: 60 (08-30-23 @ 05:00) (60 - 72)  BP: 131/60 (08-30-23 @ 05:00) (128/60 - 132/60)  RR: 18 (08-30-23 @ 05:00) (18 - 20)      General:[  x ] NAD, Resting Comfortable,   [   ] other:                                HEENT: [ x  ] NC/AT, EOMI, PERRL , Normal Conjunctivae,   [   ] other:  Cardio: [x  ] RRR, no murmur,   [   ] other:                              Pulm: [x   ] No Respiratory Distress,  Lungs CTAB,   [   ] other:                       Abdomen: [ x  ]ND/NT, Soft,   [   ] other:    : [ x  ] NO MCLEAN CATHETER, [   ] MCLEAN CATHETER- no meatal tear, no discharge, [   ] other:                                            MSK: [   ] No joint swelling, Full ROM,   [  x ] other: Mild boutonniere deformity present in RUE thumb.  Reduced ROM of L hip and knee 2/2 to pain and recent surgery. otherwise all other extremities full ROM                                      Ext: [  x ]No C/C/E, No calf tenderness,   [   ]other:    Skin: [   ]intact,   [ x  ] other:    surgical site of L hip dry                                                             Neurological Examination:  Cognitive: [   x ] AAO x 3,   [    ]  other:                                                                      Attention:  [  x  ] intact,   [    ]  other:                            Memory: [ x   ] intact,    [    ]  other:     Mood/Affect: [  x  ] wnl,    [    ]  other:                                                                             Communication: [ x   ]Fluent, no dysarthria, following commands:  [    ] other:   CN II - XII:  [ x   ] intact,  [    ] other:                                                                                        Motor:   RIGHT UE: [x   ] WNL,  [   ] other: 5/5 throughout  LEFT    UE: [x   ] WNL,  [   ] other: 5/5 throughout  RIGHT LE: [ x  ] WNL,  [   ] other: 5/5 throughout  LEFT    LE: [   ] WNL,  [  x ] other: 2+/5 HF, unable to assess KF/KE due to pain, 5/5 DF/PF    Tone: [ x   ] wnl,   [    ]  other:  DTRs: [ x  ]symmetric 3+, [   ] other:  Coordination:   [  x  ] intact,   [    ] other:  FTN intact BUE, unable to conduct HTS due to lack of ROM and pain. proprioception intact in BLE                                                                         Sensory: [  x  ] Intact to light touch and proprioception      MEDICATIONS  (STANDING):  acetaminophen     Tablet .. 1000 milliGRAM(s) Oral every 8 hours  chlorhexidine 2% Cloths 1 Application(s) Topical <User Schedule>  enoxaparin Injectable 40 milliGRAM(s) SubCutaneous every 24 hours  folic acid 1 milliGRAM(s) Oral <User Schedule>  ibuprofen  Tablet. 600 milliGRAM(s) Oral <User Schedule>  methotrexate 15 milliGRAM(s) Oral <User Schedule>  pantoprazole    Tablet 40 milliGRAM(s) Oral before breakfast    MEDICATIONS  (PRN):  aluminum hydroxide/magnesium hydroxide/simethicone Suspension 30 milliLiter(s) Oral every 4 hours PRN Dyspepsia  magnesium citrate Oral Solution 296 milliLiter(s) Oral once PRN Constipation  melatonin 3 milliGRAM(s) Oral at bedtime PRN Insomnia  oxyCODONE    IR 5 milliGRAM(s) Oral every 4 hours PRN moderate to severe pain (4 - 10/10)  polyethylene glycol 3350 17 Gram(s) Oral daily PRN Constipation  senna 2 Tablet(s) Oral daily PRN Constipation      RECENT LABS/IMAGING                        13.4   5.79  )-----------( 175      ( 28 Aug 2023 08:20 )             41.9     08-28    144  |  110  |  19  ----------------------------<  97  4.2   |  22  |  0.9    Ca    9.7      28 Aug 2023 08:20  Mg     2.1     08-28    TPro  6.6  /  Alb  4.3  /  TBili  0.3  /  DBili  x   /  AST  34  /  ALT  40  /  AlkPhos  107  08-28      < from: Xray Hip 2-3 Views, Left (08.22.23 @ 13:06) >  The patient is again status post percutaneous screw fixation of a left   femoral neck fracture. The hardware is intact. Fracture alignment is not   appreciably changed. Skin staples are again present.    No acute fracture is seen. There is no dislocation. The patient is again   status post right hip hemiarthroplasty, without evidence of complication.    < end of copied text >

## 2023-08-31 VITALS
SYSTOLIC BLOOD PRESSURE: 132 MMHG | TEMPERATURE: 97 F | DIASTOLIC BLOOD PRESSURE: 63 MMHG | RESPIRATION RATE: 18 BRPM | HEART RATE: 68 BPM

## 2023-08-31 RX ORDER — NALOXONE HYDROCHLORIDE 4 MG/.1ML
4 SPRAY NASAL
Qty: 1 | Refills: 0
Start: 2023-08-31

## 2023-08-31 RX ORDER — FOLIC ACID 0.8 MG
1 TABLET ORAL
Qty: 0 | Refills: 0 | DISCHARGE

## 2023-08-31 RX ORDER — FOLIC ACID 0.8 MG
1 TABLET ORAL
Qty: 30 | Refills: 0
Start: 2023-08-31 | End: 2023-09-29

## 2023-08-31 RX ORDER — METHOTREXATE 2.5 MG/1
6 TABLET ORAL
Qty: 26 | Refills: 0
Start: 2023-08-31 | End: 2023-09-29

## 2023-08-31 RX ADMIN — Medication 600 MILLIGRAM(S): at 08:27

## 2023-08-31 RX ADMIN — PANTOPRAZOLE SODIUM 40 MILLIGRAM(S): 20 TABLET, DELAYED RELEASE ORAL at 08:27

## 2023-08-31 RX ADMIN — ENOXAPARIN SODIUM 40 MILLIGRAM(S): 100 INJECTION SUBCUTANEOUS at 08:27

## 2023-08-31 RX ADMIN — Medication 1000 MILLIGRAM(S): at 08:59

## 2023-08-31 RX ADMIN — METHOTREXATE 15 MILLIGRAM(S): 2.5 TABLET ORAL at 08:28

## 2023-08-31 RX ADMIN — Medication 600 MILLIGRAM(S): at 08:57

## 2023-08-31 RX ADMIN — Medication 1000 MILLIGRAM(S): at 08:29

## 2023-08-31 NOTE — PROGRESS NOTE ADULT - SUBJECTIVE AND OBJECTIVE BOX
Patient is a 78y old  Female who presents with a chief complaint of Acute Rehabilitation of L femoral neck fracture s/p percutaneous pinning on 8/14/23 (18 Aug 2023 12:42)      HPI:  78F w/PMHx of RA on MTX, Hx of bladder CA s/p TURBT by Dr. 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  Patient reports was crouched and unloading her clothes washer when she tripped and fell over to her left side.  Patient experienced a large amount of pain and was unable to get up.  Patient was on floor approx 30m before she was found by a family member.  They were unable to get the patient up and activated EMS for transport to ED.  Patient with complaints of left hip pain, unable to straighten it or bear weight.  She also has a recent history of 'dizziness', for which she had an extensive outpatient w/u, and has improved after Vestibular Therapy.     Pt found to have a L femoral neck fracture and on 8/14/23 patient went to OR and underwent a closed reduction and percutaneous pinning without any complications.   Her course was complicated by significant elevations in her Transaminase levels, now resolving. She also developed Thrombocytopenia which is stable.     Current level of function of patient prior to admission to rehab with PT on 8/18 reveals Katheryn bed mobility, CGA/ Katheryn transfers, ambulation CG WBAT LLE RW 40ft. With OT on 8/18/23, bathing Katheryn, supervision UBD, modA LBD, toileting modA, supervision grooming, supervision eating    The patient was evaluated by the PM&R team once medically stable. The patient was found to have functional limitation in terms of muscle strength, endurance, physical mobility, and ability to carry out activities of daily living (self care, transfers, and ambulation). The patient was started on a course of bedside therapy, the pt is motivated and able to start 3 hours of therapy  daily for 6-7 days a week. the patient was deemed to be a good candidate for admission for acute inpatient rehab. The patient was admitted to acute inpatient rehab on 8/18/23.   (18 Aug 2023 12:42)    TODAY'S SUBJECTIVE & REVIEW OF SYMPTOMS:  Pt seen and examined at bedside.  No overnight events.  No new complaints.  Left hip pain stable.  Voiding urine/stool spontaneously.  VS reviewed.      Current level of functioning  Supervision with  bed mobility and transfers; supervision for ambulation using RW.      Constitutional:    [  x ] WNL           [   ] poor appetite   [   ] insomnia   [   ] tired   Cardio:                [ x  ] WNL           [   ] CP   [   ] LOUIS   [   ] palpitations               Resp:                   [x   ] WNL           [   ] SOB   [   ] cough   [   ] wheezing   GI:                        [   ] WNL           [ x  ] constipation   [   ] diarrhea   [   ] abdominal pain   [   ] nausea   [   ] emesis                                :                      [x   ] WNL           [   ] MCLEAN  [   ] dysuria   [   ] difficulty voiding             Endo:                   [x   ] WNL          [   ] polyuria   [   ] temperature intolerance                 Skin:                     [   ] WNL          [   ] pain   [x   ] wound, surgical site dry  [   ] rash   MSK:                    [   ] WNL          [   ] muscle pain   [  x ] joint pain/ stiffness L hip  [   ] muscle tenderness     Neuro:                 [x   ] WNL          [   ] HA   [   ] change in vision   [   ] tremor   [   ] weakness   [   ]dysphagia              Cognitive:           [x   ] WNL           [   ]confusion      Psych:                  [x   ] WNL           [   ] hallucinations   [   ]agitation   [   ] delusion   [   ]depression      PHYSICAL EXAM    Vital Signs Last 24 Hrs  T(C): 36.3 (31 Aug 2023 05:30), Max: 36.5 (30 Aug 2023 20:15)  T(F): 97.4 (31 Aug 2023 05:30), Max: 97.7 (30 Aug 2023 20:15)  HR: 63 (31 Aug 2023 05:30) (62 - 81)  BP: 148/62 (31 Aug 2023 05:30) (130/63 - 148/62)  BP(mean): 64 (30 Aug 2023 20:15) (64 - 64)  RR: 18 (31 Aug 2023 05:30) (18 - 19)  SpO2: --      General:[  x ] NAD, Resting Comfortable,   [   ] other:                                HEENT: [ x  ] NC/AT, EOMI, PERRL , Normal Conjunctivae,   [   ] other:  Cardio: [x  ] RRR, no murmur,   [   ] other:                              Pulm: [x   ] No Respiratory Distress,  Lungs CTAB,   [   ] other:                       Abdomen: [ x  ]ND/NT, Soft,   [   ] other:    : [ x  ] NO MCLEAN CATHETER, [   ] MCLEAN CATHETER- no meatal tear, no discharge, [   ] other:                                            MSK: [   ] No joint swelling, Full ROM,   [  x ] other: Mild boutonniere deformity present in RUE thumb.  Reduced ROM of L hip and knee 2/2 to pain and recent surgery. otherwise all other extremities full ROM                                      Ext: [  x ]No C/C/E, No calf tenderness,   [   ]other:    Skin: [   ]intact,   [ x  ] other:    surgical site of L hip dry                                                             Neurological Examination:  Cognitive: [   x ] AAO x 3,   [    ]  other:                                                                      Attention:  [  x  ] intact,   [    ]  other:                            Memory: [ x   ] intact,    [    ]  other:     Mood/Affect: [  x  ] wnl,    [    ]  other:                                                                             Communication: [ x   ]Fluent, no dysarthria, following commands:  [    ] other:   CN II - XII:  [ x   ] intact,  [    ] other:                                                                                        Motor:   RIGHT UE: [x   ] WNL,  [   ] other: 5/5 throughout  LEFT    UE: [x   ] WNL,  [   ] other: 5/5 throughout  RIGHT LE: [ x  ] WNL,  [   ] other: 5/5 throughout  LEFT    LE: [   ] WNL,  [  x ] other: 2+/5 HF, unable to assess KF/KE due to pain, 5/5 DF/PF    Tone: [ x   ] wnl,   [    ]  other:  DTRs: [ x  ]symmetric 3+, [   ] other:  Coordination:   [  x  ] intact,   [    ] other:  FTN intact BUE, unable to conduct HTS due to lack of ROM and pain. proprioception intact in BLE                                                                         Sensory: [  x  ] Intact to light touch and proprioception    MEDICATIONS  (STANDING):  acetaminophen     Tablet .. 1000 milliGRAM(s) Oral every 8 hours  chlorhexidine 2% Cloths 1 Application(s) Topical <User Schedule>  enoxaparin Injectable 40 milliGRAM(s) SubCutaneous every 24 hours  folic acid 1 milliGRAM(s) Oral <User Schedule>  ibuprofen  Tablet. 600 milliGRAM(s) Oral <User Schedule>  methotrexate 15 milliGRAM(s) Oral <User Schedule>  pantoprazole    Tablet 40 milliGRAM(s) Oral before breakfast    MEDICATIONS  (PRN):  aluminum hydroxide/magnesium hydroxide/simethicone Suspension 30 milliLiter(s) Oral every 4 hours PRN Dyspepsia  oxyCODONE    IR 5 milliGRAM(s) Oral every 4 hours PRN moderate to severe pain (4 - 10/10)  polyethylene glycol 3350 17 Gram(s) Oral daily PRN Constipation  senna 2 Tablet(s) Oral daily PRN Constipation        RECENT LABS/IMAGING                        13.4   5.79  )-----------( 175      ( 28 Aug 2023 08:20 )             41.9     08-28    144  |  110  |  19  ----------------------------<  97  4.2   |  22  |  0.9    Ca    9.7      28 Aug 2023 08:20  Mg     2.1     08-28    TPro  6.6  /  Alb  4.3  /  TBili  0.3  /  DBili  x   /  AST  34  /  ALT  40  /  AlkPhos  107  08-28      < from: Xray Hip 2-3 Views, Left (08.22.23 @ 13:06) >  The patient is again status post percutaneous screw fixation of a left   femoral neck fracture. The hardware is intact. Fracture alignment is not   appreciably changed. Skin staples are again present.    No acute fracture is seen. There is no dislocation. The patient is again   status post right hip hemiarthroplasty, without evidence of complication.    < end of copied text >

## 2023-08-31 NOTE — PROGRESS NOTE ADULT - ASSESSMENT
ASSESSMENT/PLAN  78F w/PMHx of RA on MTX, Hx of bladder CA s/p TURBT by Dr. 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  Patient reports was crouched and unloading her clothes washer when she tripped and fell over to her left side.  Pt with left femoral neck fracture s/p closed reduction and percutaneous pinning of left hip on 8/14 and admitted to acute rehab on 8/18.    #Rehab of Fall s/p L femoral neck fracture s/p closed reduction and percutaneous pinning of left hip on 8/14.  - Patient requires a full rehab program of 3 hours a day of PT, OT  -orthopedics following  - patient c/o ongoing left hip/ groin pain with ROM/ walking.  F/U x-ray with no displacement of pins  - Patient requesting Ibuprofen 600mg daily which she takes at home. Ordered  - c/w ibuprofen 600 mg po daily with breakfast  - c/w Oxycodone 5 mg q4hr prn mod pain; wean off narcotics as appropriate  - Acetominophen changed to 1000 mg q 8 hrs. standing  - Weight bearing: WBAT LLE with walker  - DC home today with family and VN home PT.  Ambulates functional distance with RW with supervision.  - F/U with Ortho in 3 weeks.   - Continue Acetominophen and  prn Oxycodone and Ibuprofen as needed    #s/p Normocytic anemia; likely 2/2 postop blood loss.  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 Monitor BP at home and f/u with PMD      Diet, Regular (08-18-23 @ 14:57) [Active]    - Ortho: Weight bearing status: WBAT LLE with walker    Ortho f/u in 3 weeks  F/u with PMD in 2-3 weeks  Physiatry f/u prn    For further summary of hospital course and d/c med rec and patient instructions, see Provider D/C Note, reviewed and signed by me.

## 2023-08-31 NOTE — PROGRESS NOTE ADULT - REASON FOR ADMISSION
Acute Rehabilitation of L femoral neck fracture s/p percutaneous pinning on 8/14/23

## 2023-09-06 DIAGNOSIS — Z90.49 ACQUIRED ABSENCE OF OTHER SPECIFIED PARTS OF DIGESTIVE TRACT: ICD-10-CM

## 2023-09-06 DIAGNOSIS — Z79.82 LONG TERM (CURRENT) USE OF ASPIRIN: ICD-10-CM

## 2023-09-06 DIAGNOSIS — Z85.51 PERSONAL HISTORY OF MALIGNANT NEOPLASM OF BLADDER: ICD-10-CM

## 2023-09-06 DIAGNOSIS — S72.002D FRACTURE OF UNSPECIFIED PART OF NECK OF LEFT FEMUR, SUBSEQUENT ENCOUNTER FOR CLOSED FRACTURE WITH ROUTINE HEALING: ICD-10-CM
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
## 2023-09-06 DIAGNOSIS — D50.0 IRON DEFICIENCY ANEMIA SECONDARY TO BLOOD LOSS (CHRONIC): ICD-10-CM

## 2023-09-06 DIAGNOSIS — Z79.631 LONG TERM (CURRENT) USE OF ANTIMETABOLITE AGENT: ICD-10-CM

## 2023-09-06 DIAGNOSIS — D69.6 THROMBOCYTOPENIA, UNSPECIFIED: ICD-10-CM

## 2023-09-06 DIAGNOSIS — D84.821 IMMUNODEFICIENCY DUE TO DRUGS: ICD-10-CM

## 2023-09-06 DIAGNOSIS — M06.9 RHEUMATOID ARTHRITIS, UNSPECIFIED: ICD-10-CM

## 2023-09-06 DIAGNOSIS — N18.31 CHRONIC KIDNEY DISEASE, STAGE 3A: ICD-10-CM

## 2023-09-06 DIAGNOSIS — Z96.641 PRESENCE OF RIGHT ARTIFICIAL HIP JOINT: ICD-10-CM

## 2023-09-06 DIAGNOSIS — Z87.891 PERSONAL HISTORY OF NICOTINE DEPENDENCE: ICD-10-CM

## 2023-09-06 DIAGNOSIS — K59.00 CONSTIPATION, UNSPECIFIED: ICD-10-CM

## 2023-09-06 DIAGNOSIS — I67.1 CEREBRAL ANEURYSM, NONRUPTURED: ICD-10-CM

## 2023-09-07 ENCOUNTER — EMERGENCY (EMERGENCY)
Facility: HOSPITAL | Age: 78
LOS: 0 days | Discharge: HOME | End: 2023-09-07
Attending: EMERGENCY MEDICINE
Payer: MEDICARE

## 2023-09-07 VITALS
TEMPERATURE: 97 F | RESPIRATION RATE: 18 BRPM | HEIGHT: 65 IN | OXYGEN SATURATION: 100 % | SYSTOLIC BLOOD PRESSURE: 139 MMHG | HEART RATE: 87 BPM | DIASTOLIC BLOOD PRESSURE: 65 MMHG | WEIGHT: 134.92 LBS

## 2023-09-07 DIAGNOSIS — K59.00 CONSTIPATION, UNSPECIFIED: ICD-10-CM

## 2023-09-07 DIAGNOSIS — N83.209 UNSPECIFIED OVARIAN CYST, UNSPECIFIED SIDE: Chronic | ICD-10-CM

## 2023-09-07 DIAGNOSIS — Z90.49 ACQUIRED ABSENCE OF OTHER SPECIFIED PARTS OF DIGESTIVE TRACT: Chronic | ICD-10-CM

## 2023-09-07 DIAGNOSIS — Z88.6 ALLERGY STATUS TO ANALGESIC AGENT: ICD-10-CM

## 2023-09-07 DIAGNOSIS — Z98.890 OTHER SPECIFIED POSTPROCEDURAL STATES: Chronic | ICD-10-CM

## 2023-09-07 DIAGNOSIS — Z85.51 PERSONAL HISTORY OF MALIGNANT NEOPLASM OF BLADDER: ICD-10-CM

## 2023-09-07 DIAGNOSIS — Z98.41 CATARACT EXTRACTION STATUS, RIGHT EYE: Chronic | ICD-10-CM

## 2023-09-07 DIAGNOSIS — Z79.82 LONG TERM (CURRENT) USE OF ASPIRIN: ICD-10-CM

## 2023-09-07 DIAGNOSIS — Z90.49 ACQUIRED ABSENCE OF OTHER SPECIFIED PARTS OF DIGESTIVE TRACT: ICD-10-CM

## 2023-09-07 DIAGNOSIS — Z87.39 PERSONAL HISTORY OF OTHER DISEASES OF THE MUSCULOSKELETAL SYSTEM AND CONNECTIVE TISSUE: ICD-10-CM

## 2023-09-07 DIAGNOSIS — Z98.42 CATARACT EXTRACTION STATUS, LEFT EYE: ICD-10-CM

## 2023-09-07 DIAGNOSIS — Z96.641 PRESENCE OF RIGHT ARTIFICIAL HIP JOINT: Chronic | ICD-10-CM

## 2023-09-07 DIAGNOSIS — K21.9 GASTRO-ESOPHAGEAL REFLUX DISEASE WITHOUT ESOPHAGITIS: ICD-10-CM

## 2023-09-07 DIAGNOSIS — Z98.890 OTHER SPECIFIED POSTPROCEDURAL STATES: ICD-10-CM

## 2023-09-07 DIAGNOSIS — Z96.641 PRESENCE OF RIGHT ARTIFICIAL HIP JOINT: ICD-10-CM

## 2023-09-07 DIAGNOSIS — Z98.41 CATARACT EXTRACTION STATUS, RIGHT EYE: ICD-10-CM

## 2023-09-07 DIAGNOSIS — Z87.42 PERSONAL HISTORY OF OTHER DISEASES OF THE FEMALE GENITAL TRACT: ICD-10-CM

## 2023-09-07 PROCEDURE — 99284 EMERGENCY DEPT VISIT MOD MDM: CPT

## 2023-09-07 PROCEDURE — 99283 EMERGENCY DEPT VISIT LOW MDM: CPT

## 2023-09-07 RX ORDER — MINERAL OIL
133 OIL (ML) MISCELLANEOUS ONCE
Refills: 0 | Status: COMPLETED | OUTPATIENT
Start: 2023-09-07 | End: 2023-09-07

## 2023-09-07 RX ORDER — SOD SULF/SODIUM/NAHCO3/KCL/PEG
1 SOLUTION, RECONSTITUTED, ORAL ORAL
Qty: 1 | Refills: 0
Start: 2023-09-07

## 2023-09-07 RX ADMIN — Medication 133 MILLILITER(S): at 17:36

## 2023-09-07 NOTE — ED PROVIDER NOTE - PHYSICAL EXAMINATION
CONSTITUTIONAL: well-appearing, in NAD  SKIN: Warm dry, normal skin turgor  HEAD: NCAT  EYES: EOMI, PERRLA, no scleral icterus, conjunctiva pink  ENT: normal pharynx with no erythema or exudates  NECK: Supple; non tender. Full ROM.  CARD: RRR, no murmurs.  RESP: clear to ausculation b/l. No crackles or wheezing.  ABD: soft, non-tender, non-distended, no rebound or guarding.  RECTAL: impacted stool in the vault that was able to be removed.  EXT: Full ROM, no bony tenderness, no pedal edema, no calf tenderness  NEURO: normal motor. normal sensory. CN II-XII intact. Cerebellar testing normal. Normal gait.  PSYCH: Cooperative, appropriate.

## 2023-09-07 NOTE — ED PROVIDER NOTE - OBJECTIVE STATEMENT
Patient is a 78y female presenting to the ED for evaluation of constipation for the last 10 days s/p hip fx and ORIF. pt has been taking percocet but stopped 3 days ago and reports ongoing inability to have a bowel movement despite taking senna miralax. Otherwise denies any fever, chills, headache, changes in vision, cough, congestion, cp, palpitations, sob, n/v/d, abd pain, urinary complaints, lower extremity pain/swelling.

## 2023-09-07 NOTE — ED PROVIDER NOTE - CLINICAL SUMMARY MEDICAL DECISION MAKING FREE TEXT BOX
Pt required manual disimpaction. will need to cont stool softeners. will try Golytely. Pt will need to f/u with PMD and GI. Pt instructed to return if any worsening symptoms or concerns.  They verbalize understanding.

## 2023-09-07 NOTE — ED ADULT NURSE NOTE - DOES PATIENT HAVE ADVANCE DIRECTIVE
Requiring pressors in the MICU. Likely 2/2 CAP vs. bacteremia. BCx (1/1) positive for Gram positive cocci in clusters, coagulase negative Staph x2 sets. Repeat BCx (1/2) NGTD.  - c/w cefazolin for coag. negative Staph (since 12/31) for total 14 days (last day is 1/13) and appears to be responding to therapy No

## 2023-09-07 NOTE — ED ADULT NURSE NOTE - NSFALLHARMRISKINTERV_ED_ALL_ED

## 2023-09-07 NOTE — ED PROVIDER NOTE - ATTENDING CONTRIBUTION TO CARE
77 yo F PMHx noted , s/o hip replacement 10 days ago presents with difficulty with BM since then. Pt was on Percocet which she stopped 3 days ago. Pt tried Miralax and Senna with no relief. Pt states she just sees stool when she wipes, but not in toilet. no n/v. on exam pt in nad aao x 3, abd soft nt nd, + BS, + Hardf brown stool in rectal vault

## 2023-09-07 NOTE — ED PROVIDER NOTE - PATIENT PORTAL LINK FT
You can access the FollowMyHealth Patient Portal offered by [facility] by registering at the following website: [email or web address]. By joining [employer]’s FollowMyHealth portal, you will also be able to view your health information using other applications (apps) compatible with our system.

## 2023-09-07 NOTE — ED PROVIDER NOTE - PROGRESS NOTE DETAILS
pk: stool removed, pt able to have BM with enema, will dc with Golytely and pcp hernesto. pt agrees with plan.

## 2023-09-26 ENCOUNTER — APPOINTMENT (OUTPATIENT)
Dept: ORTHOPEDIC SURGERY | Facility: CLINIC | Age: 78
End: 2023-09-26
Payer: MEDICARE

## 2023-09-26 PROCEDURE — 99024 POSTOP FOLLOW-UP VISIT: CPT

## 2023-09-26 PROCEDURE — 73502 X-RAY EXAM HIP UNI 2-3 VIEWS: CPT

## 2023-11-09 ENCOUNTER — INPATIENT (INPATIENT)
Facility: HOSPITAL | Age: 78
LOS: 3 days | Discharge: HOME | DRG: 179 | End: 2023-11-13
Attending: STUDENT IN AN ORGANIZED HEALTH CARE EDUCATION/TRAINING PROGRAM | Admitting: INTERNAL MEDICINE
Payer: MEDICARE

## 2023-11-09 VITALS
DIASTOLIC BLOOD PRESSURE: 69 MMHG | WEIGHT: 130.07 LBS | TEMPERATURE: 100 F | SYSTOLIC BLOOD PRESSURE: 153 MMHG | HEART RATE: 84 BPM | RESPIRATION RATE: 18 BRPM | OXYGEN SATURATION: 98 % | HEIGHT: 65 IN

## 2023-11-09 DIAGNOSIS — Z90.49 ACQUIRED ABSENCE OF OTHER SPECIFIED PARTS OF DIGESTIVE TRACT: Chronic | ICD-10-CM

## 2023-11-09 DIAGNOSIS — Z98.41 CATARACT EXTRACTION STATUS, RIGHT EYE: Chronic | ICD-10-CM

## 2023-11-09 DIAGNOSIS — Z98.890 OTHER SPECIFIED POSTPROCEDURAL STATES: Chronic | ICD-10-CM

## 2023-11-09 DIAGNOSIS — N83.209 UNSPECIFIED OVARIAN CYST, UNSPECIFIED SIDE: Chronic | ICD-10-CM

## 2023-11-09 DIAGNOSIS — Z96.641 PRESENCE OF RIGHT ARTIFICIAL HIP JOINT: Chronic | ICD-10-CM

## 2023-11-09 LAB
ALBUMIN SERPL ELPH-MCNC: 3.7 G/DL — SIGNIFICANT CHANGE UP (ref 3.5–5.2)
ALBUMIN SERPL ELPH-MCNC: 3.7 G/DL — SIGNIFICANT CHANGE UP (ref 3.5–5.2)
ALP SERPL-CCNC: 85 U/L — SIGNIFICANT CHANGE UP (ref 30–115)
ALP SERPL-CCNC: 85 U/L — SIGNIFICANT CHANGE UP (ref 30–115)
ALT FLD-CCNC: 18 U/L — SIGNIFICANT CHANGE UP (ref 0–41)
ALT FLD-CCNC: 18 U/L — SIGNIFICANT CHANGE UP (ref 0–41)
ANION GAP SERPL CALC-SCNC: 11 MMOL/L — SIGNIFICANT CHANGE UP (ref 7–14)
ANION GAP SERPL CALC-SCNC: 11 MMOL/L — SIGNIFICANT CHANGE UP (ref 7–14)
APPEARANCE UR: CLEAR — SIGNIFICANT CHANGE UP
APPEARANCE UR: CLEAR — SIGNIFICANT CHANGE UP
APTT BLD: 29.2 SEC — SIGNIFICANT CHANGE UP (ref 27–39.2)
APTT BLD: 29.2 SEC — SIGNIFICANT CHANGE UP (ref 27–39.2)
AST SERPL-CCNC: 27 U/L — SIGNIFICANT CHANGE UP (ref 0–41)
AST SERPL-CCNC: 27 U/L — SIGNIFICANT CHANGE UP (ref 0–41)
BASE EXCESS BLDV CALC-SCNC: 2.2 MMOL/L — SIGNIFICANT CHANGE UP (ref -2–3)
BASE EXCESS BLDV CALC-SCNC: 2.2 MMOL/L — SIGNIFICANT CHANGE UP (ref -2–3)
BASOPHILS # BLD AUTO: 0.01 K/UL — SIGNIFICANT CHANGE UP (ref 0–0.2)
BASOPHILS # BLD AUTO: 0.01 K/UL — SIGNIFICANT CHANGE UP (ref 0–0.2)
BASOPHILS NFR BLD AUTO: 0.2 % — SIGNIFICANT CHANGE UP (ref 0–1)
BASOPHILS NFR BLD AUTO: 0.2 % — SIGNIFICANT CHANGE UP (ref 0–1)
BILIRUB SERPL-MCNC: 0.3 MG/DL — SIGNIFICANT CHANGE UP (ref 0.2–1.2)
BILIRUB SERPL-MCNC: 0.3 MG/DL — SIGNIFICANT CHANGE UP (ref 0.2–1.2)
BILIRUB UR-MCNC: NEGATIVE — SIGNIFICANT CHANGE UP
BILIRUB UR-MCNC: NEGATIVE — SIGNIFICANT CHANGE UP
BUN SERPL-MCNC: 20 MG/DL — SIGNIFICANT CHANGE UP (ref 10–20)
BUN SERPL-MCNC: 20 MG/DL — SIGNIFICANT CHANGE UP (ref 10–20)
CA-I SERPL-SCNC: 1.21 MMOL/L — SIGNIFICANT CHANGE UP (ref 1.15–1.33)
CA-I SERPL-SCNC: 1.21 MMOL/L — SIGNIFICANT CHANGE UP (ref 1.15–1.33)
CALCIUM SERPL-MCNC: 9 MG/DL — SIGNIFICANT CHANGE UP (ref 8.4–10.5)
CALCIUM SERPL-MCNC: 9 MG/DL — SIGNIFICANT CHANGE UP (ref 8.4–10.5)
CHLORIDE SERPL-SCNC: 108 MMOL/L — SIGNIFICANT CHANGE UP (ref 98–110)
CHLORIDE SERPL-SCNC: 108 MMOL/L — SIGNIFICANT CHANGE UP (ref 98–110)
CO2 SERPL-SCNC: 24 MMOL/L — SIGNIFICANT CHANGE UP (ref 17–32)
CO2 SERPL-SCNC: 24 MMOL/L — SIGNIFICANT CHANGE UP (ref 17–32)
COLOR SPEC: YELLOW — SIGNIFICANT CHANGE UP
COLOR SPEC: YELLOW — SIGNIFICANT CHANGE UP
CREAT SERPL-MCNC: 1 MG/DL — SIGNIFICANT CHANGE UP (ref 0.7–1.5)
CREAT SERPL-MCNC: 1 MG/DL — SIGNIFICANT CHANGE UP (ref 0.7–1.5)
DIFF PNL FLD: ABNORMAL
DIFF PNL FLD: ABNORMAL
EGFR: 58 ML/MIN/1.73M2 — LOW
EGFR: 58 ML/MIN/1.73M2 — LOW
EOSINOPHIL # BLD AUTO: 0.01 K/UL — SIGNIFICANT CHANGE UP (ref 0–0.7)
EOSINOPHIL # BLD AUTO: 0.01 K/UL — SIGNIFICANT CHANGE UP (ref 0–0.7)
EOSINOPHIL NFR BLD AUTO: 0.2 % — SIGNIFICANT CHANGE UP (ref 0–8)
EOSINOPHIL NFR BLD AUTO: 0.2 % — SIGNIFICANT CHANGE UP (ref 0–8)
GAS PNL BLDV: 137 MMOL/L — SIGNIFICANT CHANGE UP (ref 136–145)
GAS PNL BLDV: 137 MMOL/L — SIGNIFICANT CHANGE UP (ref 136–145)
GAS PNL BLDV: SIGNIFICANT CHANGE UP
GAS PNL BLDV: SIGNIFICANT CHANGE UP
GLUCOSE SERPL-MCNC: 120 MG/DL — HIGH (ref 70–99)
GLUCOSE SERPL-MCNC: 120 MG/DL — HIGH (ref 70–99)
GLUCOSE UR QL: NEGATIVE MG/DL — SIGNIFICANT CHANGE UP
GLUCOSE UR QL: NEGATIVE MG/DL — SIGNIFICANT CHANGE UP
HCO3 BLDV-SCNC: 26 MMOL/L — SIGNIFICANT CHANGE UP (ref 22–29)
HCO3 BLDV-SCNC: 26 MMOL/L — SIGNIFICANT CHANGE UP (ref 22–29)
HCT VFR BLD CALC: 35.7 % — LOW (ref 37–47)
HCT VFR BLD CALC: 35.7 % — LOW (ref 37–47)
HCT VFR BLDA CALC: 37 % — LOW (ref 39–51)
HCT VFR BLDA CALC: 37 % — LOW (ref 39–51)
HGB BLD CALC-MCNC: 12.4 G/DL — LOW (ref 12.6–17.4)
HGB BLD CALC-MCNC: 12.4 G/DL — LOW (ref 12.6–17.4)
HGB BLD-MCNC: 11.9 G/DL — LOW (ref 12–16)
HGB BLD-MCNC: 11.9 G/DL — LOW (ref 12–16)
IMM GRANULOCYTES NFR BLD AUTO: 0.5 % — HIGH (ref 0.1–0.3)
IMM GRANULOCYTES NFR BLD AUTO: 0.5 % — HIGH (ref 0.1–0.3)
INR BLD: 1.18 RATIO — SIGNIFICANT CHANGE UP (ref 0.65–1.3)
INR BLD: 1.18 RATIO — SIGNIFICANT CHANGE UP (ref 0.65–1.3)
KETONES UR-MCNC: NEGATIVE MG/DL — SIGNIFICANT CHANGE UP
KETONES UR-MCNC: NEGATIVE MG/DL — SIGNIFICANT CHANGE UP
LACTATE BLDV-MCNC: 1.7 MMOL/L — SIGNIFICANT CHANGE UP (ref 0.5–2)
LACTATE BLDV-MCNC: 1.7 MMOL/L — SIGNIFICANT CHANGE UP (ref 0.5–2)
LEUKOCYTE ESTERASE UR-ACNC: ABNORMAL
LEUKOCYTE ESTERASE UR-ACNC: ABNORMAL
LYMPHOCYTES # BLD AUTO: 0.39 K/UL — LOW (ref 1.2–3.4)
LYMPHOCYTES # BLD AUTO: 0.39 K/UL — LOW (ref 1.2–3.4)
LYMPHOCYTES # BLD AUTO: 7 % — LOW (ref 20.5–51.1)
LYMPHOCYTES # BLD AUTO: 7 % — LOW (ref 20.5–51.1)
MAGNESIUM SERPL-MCNC: 1.9 MG/DL — SIGNIFICANT CHANGE UP (ref 1.8–2.4)
MAGNESIUM SERPL-MCNC: 1.9 MG/DL — SIGNIFICANT CHANGE UP (ref 1.8–2.4)
MCHC RBC-ENTMCNC: 33.1 PG — HIGH (ref 27–31)
MCHC RBC-ENTMCNC: 33.1 PG — HIGH (ref 27–31)
MCHC RBC-ENTMCNC: 33.3 G/DL — SIGNIFICANT CHANGE UP (ref 32–37)
MCHC RBC-ENTMCNC: 33.3 G/DL — SIGNIFICANT CHANGE UP (ref 32–37)
MCV RBC AUTO: 99.2 FL — HIGH (ref 81–99)
MCV RBC AUTO: 99.2 FL — HIGH (ref 81–99)
MONOCYTES # BLD AUTO: 0.47 K/UL — SIGNIFICANT CHANGE UP (ref 0.1–0.6)
MONOCYTES # BLD AUTO: 0.47 K/UL — SIGNIFICANT CHANGE UP (ref 0.1–0.6)
MONOCYTES NFR BLD AUTO: 8.4 % — SIGNIFICANT CHANGE UP (ref 1.7–9.3)
MONOCYTES NFR BLD AUTO: 8.4 % — SIGNIFICANT CHANGE UP (ref 1.7–9.3)
NEUTROPHILS # BLD AUTO: 4.67 K/UL — SIGNIFICANT CHANGE UP (ref 1.4–6.5)
NEUTROPHILS # BLD AUTO: 4.67 K/UL — SIGNIFICANT CHANGE UP (ref 1.4–6.5)
NEUTROPHILS NFR BLD AUTO: 83.7 % — HIGH (ref 42.2–75.2)
NEUTROPHILS NFR BLD AUTO: 83.7 % — HIGH (ref 42.2–75.2)
NITRITE UR-MCNC: NEGATIVE — SIGNIFICANT CHANGE UP
NITRITE UR-MCNC: NEGATIVE — SIGNIFICANT CHANGE UP
NRBC # BLD: 0 /100 WBCS — SIGNIFICANT CHANGE UP (ref 0–0)
NRBC # BLD: 0 /100 WBCS — SIGNIFICANT CHANGE UP (ref 0–0)
PCO2 BLDV: 39 MMHG — SIGNIFICANT CHANGE UP (ref 39–42)
PCO2 BLDV: 39 MMHG — SIGNIFICANT CHANGE UP (ref 39–42)
PH BLDV: 7.44 — HIGH (ref 7.32–7.43)
PH BLDV: 7.44 — HIGH (ref 7.32–7.43)
PH UR: 6 — SIGNIFICANT CHANGE UP (ref 5–8)
PH UR: 6 — SIGNIFICANT CHANGE UP (ref 5–8)
PLATELET # BLD AUTO: 117 K/UL — LOW (ref 130–400)
PLATELET # BLD AUTO: 117 K/UL — LOW (ref 130–400)
PMV BLD: 12 FL — HIGH (ref 7.4–10.4)
PMV BLD: 12 FL — HIGH (ref 7.4–10.4)
PO2 BLDV: 45 MMHG — SIGNIFICANT CHANGE UP
PO2 BLDV: 45 MMHG — SIGNIFICANT CHANGE UP
POTASSIUM BLDV-SCNC: 3.7 MMOL/L — SIGNIFICANT CHANGE UP (ref 3.5–5.1)
POTASSIUM BLDV-SCNC: 3.7 MMOL/L — SIGNIFICANT CHANGE UP (ref 3.5–5.1)
POTASSIUM SERPL-MCNC: 3.7 MMOL/L — SIGNIFICANT CHANGE UP (ref 3.5–5)
POTASSIUM SERPL-MCNC: 3.7 MMOL/L — SIGNIFICANT CHANGE UP (ref 3.5–5)
POTASSIUM SERPL-SCNC: 3.7 MMOL/L — SIGNIFICANT CHANGE UP (ref 3.5–5)
POTASSIUM SERPL-SCNC: 3.7 MMOL/L — SIGNIFICANT CHANGE UP (ref 3.5–5)
PROT SERPL-MCNC: 5.9 G/DL — LOW (ref 6–8)
PROT SERPL-MCNC: 5.9 G/DL — LOW (ref 6–8)
PROT UR-MCNC: SIGNIFICANT CHANGE UP MG/DL
PROT UR-MCNC: SIGNIFICANT CHANGE UP MG/DL
PROTHROM AB SERPL-ACNC: 13.5 SEC — HIGH (ref 9.95–12.87)
PROTHROM AB SERPL-ACNC: 13.5 SEC — HIGH (ref 9.95–12.87)
RBC # BLD: 3.6 M/UL — LOW (ref 4.2–5.4)
RBC # BLD: 3.6 M/UL — LOW (ref 4.2–5.4)
RBC # FLD: 13.2 % — SIGNIFICANT CHANGE UP (ref 11.5–14.5)
RBC # FLD: 13.2 % — SIGNIFICANT CHANGE UP (ref 11.5–14.5)
SAO2 % BLDV: 77.4 % — SIGNIFICANT CHANGE UP
SAO2 % BLDV: 77.4 % — SIGNIFICANT CHANGE UP
SARS-COV-2 RNA SPEC QL NAA+PROBE: DETECTED
SARS-COV-2 RNA SPEC QL NAA+PROBE: DETECTED
SODIUM SERPL-SCNC: 143 MMOL/L — SIGNIFICANT CHANGE UP (ref 135–146)
SODIUM SERPL-SCNC: 143 MMOL/L — SIGNIFICANT CHANGE UP (ref 135–146)
SP GR SPEC: 1.02 — SIGNIFICANT CHANGE UP (ref 1–1.03)
SP GR SPEC: 1.02 — SIGNIFICANT CHANGE UP (ref 1–1.03)
UROBILINOGEN FLD QL: 0.2 MG/DL — SIGNIFICANT CHANGE UP (ref 0.2–1)
UROBILINOGEN FLD QL: 0.2 MG/DL — SIGNIFICANT CHANGE UP (ref 0.2–1)
WBC # BLD: 5.58 K/UL — SIGNIFICANT CHANGE UP (ref 4.8–10.8)
WBC # BLD: 5.58 K/UL — SIGNIFICANT CHANGE UP (ref 4.8–10.8)
WBC # FLD AUTO: 5.58 K/UL — SIGNIFICANT CHANGE UP (ref 4.8–10.8)
WBC # FLD AUTO: 5.58 K/UL — SIGNIFICANT CHANGE UP (ref 4.8–10.8)

## 2023-11-09 PROCEDURE — 72170 X-RAY EXAM OF PELVIS: CPT | Mod: 26,59

## 2023-11-09 PROCEDURE — 99285 EMERGENCY DEPT VISIT HI MDM: CPT

## 2023-11-09 PROCEDURE — 73502 X-RAY EXAM HIP UNI 2-3 VIEWS: CPT | Mod: 26,LT

## 2023-11-09 PROCEDURE — 70450 CT HEAD/BRAIN W/O DYE: CPT | Mod: 26,MA

## 2023-11-09 RX ORDER — ACETAMINOPHEN 500 MG
975 TABLET ORAL ONCE
Refills: 0 | Status: COMPLETED | OUTPATIENT
Start: 2023-11-09 | End: 2023-11-09

## 2023-11-09 RX ADMIN — Medication 975 MILLIGRAM(S): at 21:29

## 2023-11-09 NOTE — ED PROVIDER NOTE - OBJECTIVE STATEMENT
77 y/o female presents to the Ed for evaluation . patient with hx of RA and brain aneurysm. currently dx with covid x 2 days of fever and weakness. patient c/o weakness to b/l lower legs left>right . patient unable to stand or weight bear.  patient unable to stand due to weakness x 2 days. patient denies any back pain . patient c/o buttock pain s/p fall. patient s/p left hip pinning a few months ago. patient denies any bowel/bladder incontinence. no headache, visual changes, tinnitus. patient c/o weakness to upper extremities. no sob, chest pain , difficulty swallowing

## 2023-11-09 NOTE — ED ADULT NURSE NOTE - DOES PATIENT HAVE ADVANCE DIRECTIVE
Quality 431: Preventive Care And Screening: Unhealthy Alcohol Use - Screening: Patient screened for unhealthy alcohol use using a single question and scores less than 2 times per year Detail Level: Detailed Quality 226: Preventive Care And Screening: Tobacco Use: Screening And Cessation Intervention: Patient screened for tobacco use and is an ex/non-smoker Quality 130: Documentation Of Current Medications In The Medical Record: Current Medications Documented No

## 2023-11-09 NOTE — ED ADULT NURSE NOTE - NSFALLRISKINTERV_ED_ALL_ED

## 2023-11-09 NOTE — ED PROVIDER NOTE - CLINICAL SUMMARY MEDICAL DECISION MAKING FREE TEXT BOX
79 yo F, hx of RA, cerebral aneurysm, formerly independent, taking care of all ADLs and ambulating without assistance here for assessment of weakness, inability to ambulate in setting of recently being found to be covid +. Patient notes she went to bed Sunday feeling well, woke up Monday feeling myalgia, subjective fever and then on Tuesday was globally weak, attempted to stand and fell, landed on her buttock. She notes she has been unable to stand/walk since. Feels weak in upper extremities as well. No cough, chest pain, dyspnea, dysuria, abdominal pain.  VS normal, on exam while sitting patient is very well appearing, non toxic, has no midline CTL spine ttp, has ttp over coccyx. Patient is obviously weak at bilateral hips in flexion, even when compared to at knees and ankles.     Labs, CT head, XR of hip/pelvis are unremarkable.     At this time, etiology of weakness is unclear, no signs of epidural abscess, bilateral proximal sx without numbness, saddle anesthestia, urinary or stool incontinence, retention not suggestive of cauda equina. Considered atypical GBS, will need monitoring in hospital, has no respiratory sx at this time.     Sx could represent weakness from active covid infection however given inability to ambulate/take care of ADLs will admit.

## 2023-11-09 NOTE — ED PROVIDER NOTE - CARE PLAN
1 Principal Discharge DX:	COVID-19  Secondary Diagnosis:	Ambulatory dysfunction  Secondary Diagnosis:	Acute weakness

## 2023-11-09 NOTE — ED PROVIDER NOTE - PHYSICAL EXAMINATION
generalized: comfortable, alert , normocephalic  eyes: PERRLA, EOMI, no orbital tenderness, no bony step off  ENT: no jvd, no tongue swelling and uvula midline, oropharynx clear,   resp: CTA, no bony step off , no chest wall tenderness, no bruising to chest wall  cardiac: RRR S1S2  abd: non tender RUQ or LUQ, non distended, no bruising   msk: neck supple, no cervical tenderness, pelvis stable , no vertebral tenderness- flexion and extension in tact, gait steady, upper extremities - good rom no tenderness, lower extremities- good rom , no tenderness  skin: no lacerations, bruising or swelling   neuro: alert and oriented, follows commands, no foot drop, +left lower leg- decreased motor 1/5 hip flexor, 2/5 knee extension, 4/5 ankle rom no sensory deficits  right lower leg- 3/5 hip flexor , 4/5 knee extension, 4/5 ankle rom   absent DTR b/l knees , unable to stand or weight bear

## 2023-11-09 NOTE — ED ADULT TRIAGE NOTE - CHIEF COMPLAINT QUOTE
fall at home 24 hours ago, getting out of bed and had bilateral leg weakness, has been going on since monday.  pain to coccyx and left hip, denies blood thinners and today dx with covid

## 2023-11-10 DIAGNOSIS — U07.1 COVID-19: ICD-10-CM

## 2023-11-10 LAB
ALBUMIN SERPL ELPH-MCNC: 4 G/DL — SIGNIFICANT CHANGE UP (ref 3.5–5.2)
ALBUMIN SERPL ELPH-MCNC: 4 G/DL — SIGNIFICANT CHANGE UP (ref 3.5–5.2)
ALP SERPL-CCNC: 86 U/L — SIGNIFICANT CHANGE UP (ref 30–115)
ALP SERPL-CCNC: 86 U/L — SIGNIFICANT CHANGE UP (ref 30–115)
ALT FLD-CCNC: 18 U/L — SIGNIFICANT CHANGE UP (ref 0–41)
ALT FLD-CCNC: 18 U/L — SIGNIFICANT CHANGE UP (ref 0–41)
AST SERPL-CCNC: 28 U/L — SIGNIFICANT CHANGE UP (ref 0–41)
AST SERPL-CCNC: 28 U/L — SIGNIFICANT CHANGE UP (ref 0–41)
BACTERIA # UR AUTO: ABNORMAL /HPF
BACTERIA # UR AUTO: ABNORMAL /HPF
BILIRUB DIRECT SERPL-MCNC: <0.2 MG/DL — SIGNIFICANT CHANGE UP (ref 0–0.3)
BILIRUB DIRECT SERPL-MCNC: <0.2 MG/DL — SIGNIFICANT CHANGE UP (ref 0–0.3)
BILIRUB INDIRECT FLD-MCNC: >0.1 MG/DL — LOW (ref 0.2–1.2)
BILIRUB INDIRECT FLD-MCNC: >0.1 MG/DL — LOW (ref 0.2–1.2)
BILIRUB SERPL-MCNC: 0.3 MG/DL — SIGNIFICANT CHANGE UP (ref 0.2–1.2)
BILIRUB SERPL-MCNC: 0.3 MG/DL — SIGNIFICANT CHANGE UP (ref 0.2–1.2)
COD CRY URNS QL: PRESENT
COD CRY URNS QL: PRESENT
CREAT SERPL-MCNC: 1 MG/DL — SIGNIFICANT CHANGE UP (ref 0.7–1.5)
CREAT SERPL-MCNC: 1 MG/DL — SIGNIFICANT CHANGE UP (ref 0.7–1.5)
EGFR: 58 ML/MIN/1.73M2 — LOW
EGFR: 58 ML/MIN/1.73M2 — LOW
EPI CELLS # UR: PRESENT
EPI CELLS # UR: PRESENT
INR BLD: 1.16 RATIO — SIGNIFICANT CHANGE UP (ref 0.65–1.3)
INR BLD: 1.16 RATIO — SIGNIFICANT CHANGE UP (ref 0.65–1.3)
PROT SERPL-MCNC: 6.3 G/DL — SIGNIFICANT CHANGE UP (ref 6–8)
PROT SERPL-MCNC: 6.3 G/DL — SIGNIFICANT CHANGE UP (ref 6–8)
PROTHROM AB SERPL-ACNC: 13.2 SEC — HIGH (ref 9.95–12.87)
PROTHROM AB SERPL-ACNC: 13.2 SEC — HIGH (ref 9.95–12.87)
RBC CASTS # UR COMP ASSIST: 2 /HPF — SIGNIFICANT CHANGE UP (ref 0–4)
RBC CASTS # UR COMP ASSIST: 2 /HPF — SIGNIFICANT CHANGE UP (ref 0–4)
WBC UR QL: 10 /HPF — HIGH (ref 0–5)
WBC UR QL: 10 /HPF — HIGH (ref 0–5)

## 2023-11-10 PROCEDURE — 80076 HEPATIC FUNCTION PANEL: CPT

## 2023-11-10 PROCEDURE — 93005 ELECTROCARDIOGRAM TRACING: CPT

## 2023-11-10 PROCEDURE — 71045 X-RAY EXAM CHEST 1 VIEW: CPT

## 2023-11-10 PROCEDURE — 93010 ELECTROCARDIOGRAM REPORT: CPT

## 2023-11-10 PROCEDURE — 99221 1ST HOSP IP/OBS SF/LOW 40: CPT

## 2023-11-10 PROCEDURE — 85610 PROTHROMBIN TIME: CPT

## 2023-11-10 PROCEDURE — 72131 CT LUMBAR SPINE W/O DYE: CPT

## 2023-11-10 PROCEDURE — 80048 BASIC METABOLIC PNL TOTAL CA: CPT

## 2023-11-10 PROCEDURE — 99222 1ST HOSP IP/OBS MODERATE 55: CPT

## 2023-11-10 PROCEDURE — 85027 COMPLETE CBC AUTOMATED: CPT

## 2023-11-10 PROCEDURE — 82565 ASSAY OF CREATININE: CPT

## 2023-11-10 PROCEDURE — 71045 X-RAY EXAM CHEST 1 VIEW: CPT | Mod: 26

## 2023-11-10 PROCEDURE — 36415 COLL VENOUS BLD VENIPUNCTURE: CPT

## 2023-11-10 PROCEDURE — 97116 GAIT TRAINING THERAPY: CPT | Mod: GP

## 2023-11-10 PROCEDURE — 97110 THERAPEUTIC EXERCISES: CPT | Mod: GP

## 2023-11-10 PROCEDURE — 97162 PT EVAL MOD COMPLEX 30 MIN: CPT | Mod: GP

## 2023-11-10 RX ORDER — ASPIRIN/CALCIUM CARB/MAGNESIUM 324 MG
81 TABLET ORAL DAILY
Refills: 0 | Status: DISCONTINUED | OUTPATIENT
Start: 2023-11-10 | End: 2023-11-13

## 2023-11-10 RX ORDER — REMDESIVIR 5 MG/ML
INJECTION INTRAVENOUS
Refills: 0 | Status: DISCONTINUED | OUTPATIENT
Start: 2023-11-10 | End: 2023-11-12

## 2023-11-10 RX ORDER — REMDESIVIR 5 MG/ML
200 INJECTION INTRAVENOUS EVERY 24 HOURS
Refills: 0 | Status: COMPLETED | OUTPATIENT
Start: 2023-11-10 | End: 2023-11-10

## 2023-11-10 RX ORDER — PANTOPRAZOLE SODIUM 20 MG/1
40 TABLET, DELAYED RELEASE ORAL
Refills: 0 | Status: DISCONTINUED | OUTPATIENT
Start: 2023-11-10 | End: 2023-11-13

## 2023-11-10 RX ORDER — CHLORHEXIDINE GLUCONATE 213 G/1000ML
1 SOLUTION TOPICAL
Refills: 0 | Status: DISCONTINUED | OUTPATIENT
Start: 2023-11-10 | End: 2023-11-13

## 2023-11-10 RX ORDER — METHOTREXATE 2.5 MG/1
15 TABLET ORAL
Refills: 0 | Status: DISCONTINUED | OUTPATIENT
Start: 2023-11-10 | End: 2023-11-13

## 2023-11-10 RX ORDER — REMDESIVIR 5 MG/ML
100 INJECTION INTRAVENOUS EVERY 24 HOURS
Refills: 0 | Status: DISCONTINUED | OUTPATIENT
Start: 2023-11-11 | End: 2023-11-12

## 2023-11-10 RX ORDER — FOLIC ACID 0.8 MG
1 TABLET ORAL DAILY
Refills: 0 | Status: DISCONTINUED | OUTPATIENT
Start: 2023-11-10 | End: 2023-11-13

## 2023-11-10 RX ORDER — INFLUENZA VIRUS VACCINE 15; 15; 15; 15 UG/.5ML; UG/.5ML; UG/.5ML; UG/.5ML
0.7 SUSPENSION INTRAMUSCULAR ONCE
Refills: 0 | Status: DISCONTINUED | OUTPATIENT
Start: 2023-11-10 | End: 2023-11-13

## 2023-11-10 RX ADMIN — Medication 1 MILLIGRAM(S): at 12:09

## 2023-11-10 RX ADMIN — PANTOPRAZOLE SODIUM 40 MILLIGRAM(S): 20 TABLET, DELAYED RELEASE ORAL at 05:16

## 2023-11-10 RX ADMIN — Medication 81 MILLIGRAM(S): at 12:09

## 2023-11-10 RX ADMIN — METHOTREXATE 15 MILLIGRAM(S): 2.5 TABLET ORAL at 05:17

## 2023-11-10 RX ADMIN — REMDESIVIR 200 MILLIGRAM(S): 5 INJECTION INTRAVENOUS at 05:15

## 2023-11-10 NOTE — CONSULT NOTE ADULT - SUBJECTIVE AND OBJECTIVE BOX
SARAH TONY     78y     Female    MRN-[id number]                                                           CC:Patient is a 78y old  Female who presents with a chief complaint of     HPI:  77 y/o female pmhx of RA and brain aneurysm.  Independent, taking care of all ADLs and ambulating without assistance.  Presents to the ED with c/o  recent  dx with covid x 2 days of fever and weakness.   Patient notes she went to bed Sunday feeling well, woke up Monday feeling myalgia, fever and then on Tuesday was general weakness,  attempted to stand and fell, landing on her buttock. She notes she has been unable to stand/walk since.    Patient presently c/o weakness to b/l lower legs left > right .  Patient unable to stand due to weakness x 2 days.  Patient denies any back pain post fall however c/o buttock pain post fall.  Patient s/p left hip pinning a few months ago.  Patient denies any bowel/bladder incontinence. no headache, visual changes, tinnitus.   Denies SOB, Cough, chest pain , difficulty swallowing.      Home meds obtained from pt.    (10 Nov 2023 00:32)    Patient seen and examined and history confirmed with patient and daughter (at bedside)    ROS:  Constitutional, Neurological, Psychiatric, Eyes, ENT, Cardiovascular, Respiratory, Gastrointestinal, Genitourinary, Musculoskeletal, Integumentary, Endocrine and Heme/Lymph are otherwise negative. Except for noted above  Social History: No smoking, No drinking, No drug use    FAMILY HISTORY:  FHx: pancreatic cancer (Father)            Vital Signs Last 24 Hrs  T(C): 37.2 (10 Nov 2023 14:44), Max: 37.6 (09 Nov 2023 20:48)  T(F): 98.9 (10 Nov 2023 14:44), Max: 99.7 (09 Nov 2023 20:48)  HR: 80 (10 Nov 2023 14:44) (69 - 84)  BP: 139/67 (10 Nov 2023 14:44) (124/58 - 155/68)  BP(mean): --  RR: 18 (10 Nov 2023 14:44) (18 - 18)  SpO2: 99% (10 Nov 2023 05:07) (97% - 99%)    Parameters below as of 09 Nov 2023 22:51  Patient On (Oxygen Delivery Method): room air        Physical Exam:  Constitutional: alert and in no acute distress.  Eyes: the sclera and conjunctiva were normal, pupils were equal in size, round, reactive to light, with normal accommodation and extraocular movements were intact.   Back: no costovertebral angle tenderness and no spinal tenderness.      Neuro Exam:  Orientation: oriented to person, oriented to place and oriented to time.   Attention: normal concentrating ability and visual attention was not decreased.   Language: no difficulty naming common objects, no difficulty repeating a phrase, no difficulty writing a sentence, fluency intact, comprehension intact and reading intact.   Fund of knowledge: displays adequate knowledge of personal past history.   Cranial Nerves: visual acuity intact bilaterally, visual fields full to confrontation, pupils equal round and reactive to light, extraocular motion intact, facial sensation intact symmetrically, face symmetrical, hearing was intact bilaterally, tongue and palate midline, head turning and shoulder shrug symmetric and there was no tongue deviation with protrusion.   Motor: muscle tone was normal in all four extremities, muscle strength was normal in all four extremities and normal bulk in all four extremities.  EXCEPT in the L-HF 4+/5  Sensory exam: light touch was intact.   Coordination:. FTN NL, Gait able to walk with a walker without assistance  Deep tendon reflexes:   Biceps right 1+. Biceps left 1+.    Triceps right 1+. Triceps left 1+.    Brachioradialis right 2+. Brachioradialis left 2+.    Patella right 2+. Patella left 1+.    Ankle jerk right 2+. Ankle jerk left 2+.   Plantar responses normal on the right, normal on the left.        Allergies    morphine (Unknown)    Intolerances       Home Medications:      MEDICATIONS  (STANDING):  aspirin enteric coated 81 milliGRAM(s) Oral daily  chlorhexidine 2% Cloths 1 Application(s) Topical <User Schedule>  folic acid 1 milliGRAM(s) Oral daily  influenza  Vaccine (HIGH DOSE) 0.7 milliLiter(s) IntraMuscular once  methotrexate 15 milliGRAM(s) Oral <User Schedule>  pantoprazole    Tablet 40 milliGRAM(s) Oral before breakfast  remdesivir  IVPB   IV Intermittent     MEDICATIONS  (PRN):      LABS:                        11.9   5.58  )-----------( 117      ( 09 Nov 2023 21:41 )             35.7     11-10    x   |  x   |  x   ----------------------------<  x   x    |  x   |  1.0    Ca    9.0      09 Nov 2023 21:41  Mg     1.9     11-09    TPro  6.3  /  Alb  4.0  /  TBili  0.3  /  DBili  <0.2  /  AST  28  /  ALT  18  /  AlkPhos  86  11-10    PT/INR - ( 10 Nov 2023 07:00 )   PT: 13.20 sec;   INR: 1.16 ratio         PTT - ( 09 Nov 2023 21:41 )  PTT:29.2 sec        Neuro Imaging:  NCHCT:   < from: CT Head No Cont (11.09.23 @ 22:40) >  COMPARISON: CT head 3/1/2023    FINDINGS:    There is moderate prominence of the ventricles and sulci consistent with   stable moderate age-related parenchymal volume loss.    There are stable mild patchy periventricular white matter hypodensities   consistent with mild chronic microvascular ischemic changes.    There is no evidence of intracranial hemorrhage. There is mass effect or   midline shift.    There is no evidence of hydrocephalus. There are no extra-axial fluid   collections.    The visualized intraorbital contents are normal. There is an air-fluid   level in the left sphenoid sinus. The mastoid air cells are aerated. The   visualized soft tissues and osseous structures appear normal.      IMPRESSION:    No evidence of acute intracranial pathology.    --- End of Report ---    < end of copied text >        Assessment / Plan: This is a 78y year old Female presenting with 2 falls in 24 hours followed by LE weakness.  After the first fall she stayed in bed and didnt try standing until the next day and fell again landing on her back.  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  Covid and her inactivity may have exacerbated these issues.  1. CT lumbar spine w/o  2. PT/OT/Rehab  3. Medical management of COVID

## 2023-11-10 NOTE — PHYSICAL THERAPY INITIAL EVALUATION ADULT - PERTINENT HX OF CURRENT PROBLEM, REHAB EVAL
History of Present Illness:   77 y/o female pmhx of RA and brain aneurysm.  Independent, taking care of all ADLs and ambulating without assistance.  Presents to the ED with c/o  recent  dx with covid x 2 days of fever and weakness.   Patient notes she went to bed Sunday feeling well, woke up Monday feeling myalgia, fever and then on Tuesday was general weakness,  attempted to stand and fell, landing on her buttock. She notes she has been unable to stand/walk since.    Patient presently c/o weakness to b/l lower legs left > right .  Patient unable to stand due to weakness x 2 days.  Patient denies any back pain post fall however c/o buttock pain post fall.  Patient s/p left hip pinning a few months ago.  Patient denies any bowel/bladder incontinence. no headache, visual changes, tinnitus.   Denies SOB, Cough, chest pain , difficulty swallowing.

## 2023-11-10 NOTE — PHYSICAL THERAPY INITIAL EVALUATION ADULT - ADDITIONAL COMMENTS
Pt reports she lives alone below her daughter in house with 9 MOHIT, then level living. Pt amb without AD PTA.

## 2023-11-10 NOTE — H&P ADULT - ASSESSMENT
77 y/o female pmhx of RA and brain aneurysm.  Independent, taking care of all ADLs and ambulating without assistance.  Presents to the ED with c/o  recent  dx with covid x 2 days of fever and weakness.   Patient notes she went to bed Sunday feeling well, woke up Monday feeling myalgia, fever and then on Tuesday was general weakness,  attempted to stand and fell, landing on her buttock. She notes she has been unable to stand/walk since.    Patient presently c/o weakness to b/l lower legs left > right .  Patient unable to stand due to weakness x 2 days.  Patient denies any back pain post fall however c/o buttock pain post fall.  Patient s/p left hip pinning a few months ago.  Patient denies any bowel/bladder incontinence. no headache, visual changes, tinnitus.   Denies SOB, Cough, chest pain , difficulty swallowing.      # Covid - 19  - ID consult  -   -           # General pantera  - RTF  - PT consult 77 y/o female pmhx of RA and brain aneurysm.  Independent, taking care of all ADLs and ambulating without assistance.  Presents to the ED with c/o  recent  dx with covid x 2 days of fever and weakness.   Patient notes she went to bed Sunday feeling well, woke up Monday feeling myalgia, fever and then on Tuesday was general weakness,  attempted to stand and fell, landing on her buttock. She notes she has been unable to stand/walk since.    Patient presently c/o weakness to b/l lower legs left > right .  Patient unable to stand due to weakness x 2 days.  Patient denies any back pain post fall however c/o buttock pain post fall.  Patient s/p left hip pinning a few months ago.  Patient denies any bowel/bladder incontinence. no headache, visual changes, tinnitus.   Denies SOB, Cough, chest pain , difficulty swallowing.      # Covid - 19  - Neuro consult  - Remdesivir       # General weakness   - RTF  - PT consult    # RA  - c/w home med

## 2023-11-10 NOTE — PHYSICAL THERAPY INITIAL EVALUATION ADULT - GENERAL OBSERVATIONS, REHAB EVAL
13:15-13:45 Chart reviewed. Patient available to be seen for physical therapy, denies pain, confirmed with RN.

## 2023-11-10 NOTE — H&P ADULT - NS ATTEND AMEND GEN_ALL_CORE FT
Seen at bedside in the ER, no distress. Seen at bedside in the ER, no distress and breathing is easy. Agree with above (Neuro consult for inability to walk, viral related?) .

## 2023-11-10 NOTE — H&P ADULT - HISTORY OF PRESENT ILLNESS
[age] y/o female presents to the Ed for evaluation . patient with hx of RA and brain aneurysm. currently dx with covid x 2 days of fever and weakness. patient c/o weakness to b/l lower legs left>right . patient unable to stand or weight bear.  patient unable to stand due to weakness x 2 days. patient denies any back pain . patient c/o buttock pain s/p fall. patient s/p left hip pinning a few months ago. patient denies any bowel/bladder incontinence. no headache, visual changes, tinnitus. patient c/o weakness to upper extremities. no sob, chest pain , difficulty swallowing 79 y/o female pmhx of RA and brain aneurysm.  Independent, taking care of all ADLs and ambulating without assistance.  Presents to the ED with c/o  recent  dx with covid x 2 days of fever and weakness.   Patient notes she went to bed Sunday feeling well, woke up Monday feeling myalgia, fever and then on Tuesday was general weakness,  attempted to stand and fell, landing on her buttock. She notes she has been unable to stand/walk since.    Patient presently c/o weakness to b/l lower legs left > right .  Patient unable to stand due to weakness x 2 days.  Patient denies any back pain post fall however c/o buttock pain post fall.  Patient s/p left hip pinning a few months ago.  Patient denies any bowel/bladder incontinence. no headache, visual changes, tinnitus.   Denies SOB, Cough, chest pain , difficulty swallowing.     79 y/o female pmhx of RA and brain aneurysm.  Independent, taking care of all ADLs and ambulating without assistance.  Presents to the ED with c/o  recent  dx with covid x 2 days of fever and weakness.   Patient notes she went to bed Sunday feeling well, woke up Monday feeling myalgia, fever and then on Tuesday was general weakness,  attempted to stand and fell, landing on her buttock. She notes she has been unable to stand/walk since.    Patient presently c/o weakness to b/l lower legs left > right .  Patient unable to stand due to weakness x 2 days.  Patient denies any back pain post fall however c/o buttock pain post fall.  Patient s/p left hip pinning a few months ago.  Patient denies any bowel/bladder incontinence. no headache, visual changes, tinnitus.   Denies SOB, Cough, chest pain , difficulty swallowing.      Home meds obtained from pt.

## 2023-11-10 NOTE — PROGRESS NOTE ADULT - SUBJECTIVE AND OBJECTIVE BOX
Progress note    INTERVAL HPI/OVERNIGHT EVENTS:    Patient seen and examined at bedside. No acute distress. On room air, Denies shortness of breath.      REVIEW OF SYSTEMS:  All other 13 Review of systems were reviewed and are negative    FAMILY HISTORY:  FHx: pancreatic cancer (Father)      T(C): 37 (11-10-23 @ 05:07), Max: 37.6 (11-09-23 @ 20:48)  HR: 69 (11-10-23 @ 05:07) (69 - 84)  BP: 155/68 (11-10-23 @ 05:07) (124/58 - 155/68)  RR: 18 (11-10-23 @ 05:07) (18 - 18)  SpO2: 99% (11-10-23 @ 05:07) (97% - 99%)  Wt(kg): --Vital Signs Last 24 Hrs  T(C): 37 (10 Nov 2023 05:07), Max: 37.6 (09 Nov 2023 20:48)  T(F): 98.6 (10 Nov 2023 05:07), Max: 99.7 (09 Nov 2023 20:48)  HR: 69 (10 Nov 2023 05:07) (69 - 84)  BP: 155/68 (10 Nov 2023 05:07) (124/58 - 155/68)  BP(mean): --  RR: 18 (10 Nov 2023 05:07) (18 - 18)  SpO2: 99% (10 Nov 2023 05:07) (97% - 99%)    Parameters below as of 09 Nov 2023 22:51  Patient On (Oxygen Delivery Method): room air      morphine (Unknown)      PHYSICAL EXAM:    · Psychiatric	normal affect; alert and oriented x3; normal behavior  · Musculoskeletal	normal; normal gait; ROM intact; strength 5/5 bilateral upper extremities; strength 5/5 bilateral lower extremities  · Lymphatic	No lymphadedenopathy  · Skin	warm and dry; color normal; no rashes; no ulcers  · Motor	R Hip flexion 4/5, L Hip flexion 3/5 (limited d/t chronic pain), Sensation intact  · Neurological	cranial nerves II-XII intact; sensation intact  · Gastrointestinal	soft; nontender; normal active bowel sounds  · Cardiovascular	regular rate and rhythm  · Respiratory	clear to auscultation bilaterally  · ENMT	no gross abnormalities  · Eyes	PERRL; EOMI; conjunctiva clear  · Constitutional	no distress      Consultant(s) Notes Reviewed:  [x ] YES  [ ] NO  Care Discussed with Consultants/Other Providers [ x] YES  [ ] NO    LABS:      RADIOLOGY & ADDITIONAL TESTS:    Imaging Personally Reviewed:  [ ] YES  [ ] NO  aspirin enteric coated 81 milliGRAM(s) Oral daily  chlorhexidine 2% Cloths 1 Application(s) Topical <User Schedule>  folic acid 1 milliGRAM(s) Oral daily  influenza  Vaccine (HIGH DOSE) 0.7 milliLiter(s) IntraMuscular once  methotrexate 15 milliGRAM(s) Oral <User Schedule>  pantoprazole    Tablet 40 milliGRAM(s) Oral before breakfast  remdesivir  IVPB   IV Intermittent       HEALTH ISSUES - PROBLEM Dx:    77 y/o female pmhx of RA and brain aneurysm.  Independent, taking care of all ADLs and ambulating without assistance.  Presents to the ED with c/o  recent  dx with covid x 2 days of fever and weakness.   Patient notes she went to bed Sunday feeling well, woke up Monday feeling myalgia, fever and then on Tuesday was general weakness,  attempted to stand and fell, landing on her buttock. She notes she has been unable to stand/walk since.    Patient presently c/o weakness to b/l lower legs left > right .  Patient unable to stand due to weakness x 2 days.  Patient denies any back pain post fall however c/o buttock pain post fall.  Patient s/p left hip pinning a few months ago.  Patient denies any bowel/bladder incontinence. no headache, visual changes, tinnitus.   Denies SOB, Cough, chest pain , difficulty swallowing.      # Covid - 19 diagnosed 11/7 as outpt?  - Not on O2, no coughing  - Hold Steroids   - Remdesivir day 1 of 3      # General weakness   - Neuro consult pending  - RTF  - PT consult    # RA  - c/w methotrexate and folic acid    Handoff: came here for weakness and COVID. alr    Total time spent to complete patient's bedside assessment, review medical chart, discuss medical plan of care with covering medical team was ____35____ with > 50% of time spent face to face w/ patient, discussion with patient/family and/or coordination of care

## 2023-11-11 LAB
ALBUMIN SERPL ELPH-MCNC: 3.7 G/DL — SIGNIFICANT CHANGE UP (ref 3.5–5.2)
ALBUMIN SERPL ELPH-MCNC: 3.7 G/DL — SIGNIFICANT CHANGE UP (ref 3.5–5.2)
ALP SERPL-CCNC: 71 U/L — SIGNIFICANT CHANGE UP (ref 30–115)
ALP SERPL-CCNC: 71 U/L — SIGNIFICANT CHANGE UP (ref 30–115)
ALT FLD-CCNC: 14 U/L — SIGNIFICANT CHANGE UP (ref 0–41)
ALT FLD-CCNC: 14 U/L — SIGNIFICANT CHANGE UP (ref 0–41)
AST SERPL-CCNC: 23 U/L — SIGNIFICANT CHANGE UP (ref 0–41)
AST SERPL-CCNC: 23 U/L — SIGNIFICANT CHANGE UP (ref 0–41)
BILIRUB DIRECT SERPL-MCNC: <0.2 MG/DL — SIGNIFICANT CHANGE UP (ref 0–0.3)
BILIRUB DIRECT SERPL-MCNC: <0.2 MG/DL — SIGNIFICANT CHANGE UP (ref 0–0.3)
BILIRUB INDIRECT FLD-MCNC: >0.1 MG/DL — LOW (ref 0.2–1.2)
BILIRUB INDIRECT FLD-MCNC: >0.1 MG/DL — LOW (ref 0.2–1.2)
BILIRUB SERPL-MCNC: 0.3 MG/DL — SIGNIFICANT CHANGE UP (ref 0.2–1.2)
BILIRUB SERPL-MCNC: 0.3 MG/DL — SIGNIFICANT CHANGE UP (ref 0.2–1.2)
CREAT SERPL-MCNC: 0.8 MG/DL — SIGNIFICANT CHANGE UP (ref 0.7–1.5)
CREAT SERPL-MCNC: 0.8 MG/DL — SIGNIFICANT CHANGE UP (ref 0.7–1.5)
EGFR: 75 ML/MIN/1.73M2 — SIGNIFICANT CHANGE UP
EGFR: 75 ML/MIN/1.73M2 — SIGNIFICANT CHANGE UP
INR BLD: 1.18 RATIO — SIGNIFICANT CHANGE UP (ref 0.65–1.3)
INR BLD: 1.18 RATIO — SIGNIFICANT CHANGE UP (ref 0.65–1.3)
PROT SERPL-MCNC: 5.8 G/DL — LOW (ref 6–8)
PROT SERPL-MCNC: 5.8 G/DL — LOW (ref 6–8)
PROTHROM AB SERPL-ACNC: 13.5 SEC — HIGH (ref 9.95–12.87)
PROTHROM AB SERPL-ACNC: 13.5 SEC — HIGH (ref 9.95–12.87)

## 2023-11-11 PROCEDURE — 99233 SBSQ HOSP IP/OBS HIGH 50: CPT

## 2023-11-11 PROCEDURE — 72131 CT LUMBAR SPINE W/O DYE: CPT | Mod: 26

## 2023-11-11 RX ADMIN — Medication 1 MILLIGRAM(S): at 11:40

## 2023-11-11 RX ADMIN — Medication 81 MILLIGRAM(S): at 11:46

## 2023-11-11 RX ADMIN — PANTOPRAZOLE SODIUM 40 MILLIGRAM(S): 20 TABLET, DELAYED RELEASE ORAL at 05:34

## 2023-11-11 RX ADMIN — REMDESIVIR 200 MILLIGRAM(S): 5 INJECTION INTRAVENOUS at 05:34

## 2023-11-11 NOTE — PROGRESS NOTE ADULT - SUBJECTIVE AND OBJECTIVE BOX
SUBJECTIVE:    Patient is a 78y old Female who presents with a chief complaint of   Currently admitted to medicine with the primary diagnosis of COVID-19       Today is hospital day 1d. This morning she is resting comfortably in bed and reports no new issues or overnight events.     ROS:   CONSTITUTIONAL: No weakness, fevers or chills   EYES/ENT: No visual changes; No vertigo or throat pain   NECK: No pain or stiffness   RESPIRATORY: No cough, wheezing, hemoptysis; No shortness of breath   CARDIOVASCULAR: No chest pain or palpitations   GASTROINTESTINAL: No abdominal or epigastric pain. No nausea, vomiting, or hematemesis; No diarrhea or constipation. No melena or hematochezia.  GENITOURINARY: No dysuria, frequency or hematuria  NEUROLOGICAL: No numbness or weakness  SKIN: No itching, rashes      PAST MEDICAL & SURGICAL HISTORY  RA (rheumatoid arthritis)    Chronic GERD    Bladder cancer    Benign ovarian cyst  RIGHT OVARY REMOVED    History of cholecystectomy    History of appendectomy    History of hip replacement, total, right  1998    H/O bilateral cataract extraction    H/O transurethral resection of bladder tumor (TURBT)      SOCIAL HISTORY:    ALLERGIES:  morphine (Unknown)    MEDICATIONS:  STANDING MEDICATIONS  aspirin enteric coated 81 milliGRAM(s) Oral daily  chlorhexidine 2% Cloths 1 Application(s) Topical <User Schedule>  folic acid 1 milliGRAM(s) Oral daily  influenza  Vaccine (HIGH DOSE) 0.7 milliLiter(s) IntraMuscular once  methotrexate 15 milliGRAM(s) Oral <User Schedule>  pantoprazole    Tablet 40 milliGRAM(s) Oral before breakfast  remdesivir  IVPB 100 milliGRAM(s) IV Intermittent every 24 hours  remdesivir  IVPB   IV Intermittent     PRN MEDICATIONS    VITALS:   T(F): 97.8  HR: 71  BP: 140/76  RR: 18  SpO2: 96%    LABS:  Negative for smoking/alcohol/drug use.                         11.9   5.58  )-----------( 117      ( 09 Nov 2023 21:41 )             35.7     11-11    x   |  x   |  x   ----------------------------<  x   x    |  x   |  0.8    Ca    9.0      09 Nov 2023 21:41  Mg     1.9     11-09    TPro  5.8<L>  /  Alb  3.7  /  TBili  0.3  /  DBili  <0.2  /  AST  23  /  ALT  14  /  AlkPhos  71  11-11    PT/INR - ( 11 Nov 2023 07:46 )   PT: 13.50 sec;   INR: 1.18 ratio         PTT - ( 09 Nov 2023 21:41 )  PTT:29.2 sec  Urinalysis Basic - ( 09 Nov 2023 21:41 )    Color: x / Appearance: x / SG: x / pH: x  Gluc: 120 mg/dL / Ketone: x  / Bili: x / Urobili: x   Blood: x / Protein: x / Nitrite: x   Leuk Esterase: x / RBC: x / WBC x   Sq Epi: x / Non Sq Epi: x / Bacteria: x    RADIOLOGY:    PHYSICAL EXAM:  GEN: No acute distress  HEENT: normocephalic, atraumatic, aniceteric  LUNGS: bl breath sounds   HEART: S1/S2 present. RRR, no murmurs  ABD: Soft, non-tender, non-distended. Bowel sounds present  EXT: warm   NEURO: AAOX3, normal affect      ASSESSMENT AND PLAN:    79 y/o female pmhx of RA and brain aneurysm.  Independent, taking care of all ADLs and ambulating without assistance.  Presents to the ED with c/o  recent  dx with covid x 2 days of fever and weakness.   Patient notes she went to bed Sunday feeling well, woke up Monday feeling myalgia, fever and then on Tuesday was general weakness,  attempted to stand and fell, landing on her buttock. She notes she has been unable to stand/walk since.    Patient presently c/o weakness to b/l lower legs left > right .  Patient unable to stand due to weakness x 2 days.  Patient denies any back pain post fall however c/o buttock pain post fall.  Patient s/p left hip pinning a few months ago.  Patient denies any bowel/bladder incontinence. no headache, visual changes, tinnitus.   Denies SOB, Cough, chest pain , difficulty swallowing.    # Covid - 19 diagnosed 11/7 as outpt?  - asymptomatic   - Not on O2, no coughing  - Hold Steroids   - Remdesivir day 2/3     # Generalized weakness complicated by mechanical fall on back   possibly 2/2 covid 19 as above   # H/O Back surgery  # H/O b/l hip sx   - Neuro consult appreciated - fu CT L spine wo contast  - PT consult - home/ rehab   - denies back pain     # RA- c/w methotrexate and folic acid    # dvt/ gi ppx/diet  # dispo: acute - 24 hrs (PT - home vs rehab)  # handoff: fu CT L spine results-  if no fx/ acute injury - dc in 24 hrs

## 2023-11-12 VITALS
RESPIRATION RATE: 18 BRPM | HEART RATE: 63 BPM | DIASTOLIC BLOOD PRESSURE: 68 MMHG | SYSTOLIC BLOOD PRESSURE: 122 MMHG | TEMPERATURE: 97 F | OXYGEN SATURATION: 99 %

## 2023-11-12 PROCEDURE — 99232 SBSQ HOSP IP/OBS MODERATE 35: CPT

## 2023-11-12 RX ADMIN — Medication 1 MILLIGRAM(S): at 11:48

## 2023-11-12 RX ADMIN — REMDESIVIR 200 MILLIGRAM(S): 5 INJECTION INTRAVENOUS at 05:30

## 2023-11-12 RX ADMIN — CHLORHEXIDINE GLUCONATE 1 APPLICATION(S): 213 SOLUTION TOPICAL at 05:30

## 2023-11-12 RX ADMIN — Medication 81 MILLIGRAM(S): at 11:48

## 2023-11-12 RX ADMIN — PANTOPRAZOLE SODIUM 40 MILLIGRAM(S): 20 TABLET, DELAYED RELEASE ORAL at 05:30

## 2023-11-12 NOTE — PROGRESS NOTE ADULT - SUBJECTIVE AND OBJECTIVE BOX
Progress note    INTERVAL HPI/OVERNIGHT EVENTS:    Patient seen and examined at bedside. No acute distress. She is a little weak with ambulation but states she has been up and moving, improved since admission. She denies any bowel or bladder incontinence or saddle anesthesia.      REVIEW OF SYSTEMS:  All other 13 Review of systems were reviewed and are negative    FAMILY HISTORY:  FHx: pancreatic cancer (Father)      T(C): 36.2 (11-12-23 @ 14:07), Max: 36.8 (11-12-23 @ 05:05)  HR: 73 (11-12-23 @ 14:07) (65 - 73)  BP: 149/65 (11-12-23 @ 14:07) (114/58 - 149/65)  RR: 18 (11-12-23 @ 14:07) (18 - 18)  SpO2: --  Wt(kg): --Vital Signs Last 24 Hrs  T(C): 36.2 (12 Nov 2023 14:07), Max: 36.8 (12 Nov 2023 05:05)  T(F): 97.1 (12 Nov 2023 14:07), Max: 98.2 (12 Nov 2023 05:05)  HR: 73 (12 Nov 2023 14:07) (65 - 73)  BP: 149/65 (12 Nov 2023 14:07) (114/58 - 149/65)  BP(mean): --  RR: 18 (12 Nov 2023 14:07) (18 - 18)  SpO2: --      morphine (Unknown)      PHYSICAL EXAM:    GEN: No acute distress  HEENT: normocephalic, atraumatic, aniceteric  LUNGS: bl breath sounds   HEART: S1/S2 present. RRR, no murmurs  ABD: Soft, non-tender, non-distended. Bowel sounds present  EXT: warm   NEURO: AAOX3, normal affect    Consultant(s) Notes Reviewed:  [x ] YES  [ ] NO  Care Discussed with Consultants/Other Providers [ x] YES  [ ] NO    LABS:      RADIOLOGY & ADDITIONAL TESTS:    Imaging Personally Reviewed:  [ ] YES  [ ] NO  aspirin enteric coated 81 milliGRAM(s) Oral daily  chlorhexidine 2% Cloths 1 Application(s) Topical <User Schedule>  folic acid 1 milliGRAM(s) Oral daily  influenza  Vaccine (HIGH DOSE) 0.7 milliLiter(s) IntraMuscular once  methotrexate 15 milliGRAM(s) Oral <User Schedule>  pantoprazole    Tablet 40 milliGRAM(s) Oral before breakfast  remdesivir  IVPB   IV Intermittent   remdesivir  IVPB 100 milliGRAM(s) IV Intermittent every 24 hours      HEALTH ISSUES - PROBLEM Dx:    77 y/o female pmhx of RA and brain aneurysm.  Independent, taking care of all ADLs and ambulating without assistance.  Presents to the ED with c/o  recent  dx with covid x 2 days of fever and weakness.   Patient notes she went to bed Sunday feeling well, woke up Monday feeling myalgia, fever and then on Tuesday was general weakness,  attempted to stand and fell, landing on her buttock. She notes she has been unable to stand/walk since.    Patient presently c/o weakness to b/l lower legs left > right .  Patient unable to stand due to weakness x 2 days.  Patient denies any back pain post fall however c/o buttock pain post fall.  Patient s/p left hip pinning a few months ago.  Patient denies any bowel/bladder incontinence. no headache, visual changes, tinnitus.   Denies SOB, Cough, chest pain , difficulty swallowing.    # Covid - 19 diagnosed 11/7 as outpt?  - asymptomatic   - Not on O2, no coughing  - Hold Steroids   - S/p Remdesivir x3 days    # Generalized weakness complicated by mechanical fall on back   possibly 2/2 covid 19 as above   # H/O Back surgery  # H/O b/l hip sx   - Neuro consult appreciated   - CT lumbar spine - Multilevel lumbar spondylosis, worse at L5-S1 with severe left/moderate right foraminal stenosis.  - PT consult - home/ rehab   - denies back pain     # RA- c/w methotrexate and folic acid    # dvt/ gi ppx/diet  # dispo: acute - 24 hrs (PT - home vs rehab)  # handoff: Patient unable to go home as [facility]HA is not set up. Plan for dc in am      Total time spent to complete patient's bedside assessment, review medical chart, discuss medical plan of care with covering medical team was ____35____ with > 50% of time spent face to face w/ patient, discussion with patient/family and/or coordination of care Progress note    INTERVAL HPI/OVERNIGHT EVENTS:    Patient seen and examined at bedside. No acute distress. She is a little weak with ambulation but states she has been up and moving, improved since admission. She denies any bowel or bladder incontinence or saddle anesthesia.      REVIEW OF SYSTEMS:  All other 13 Review of systems were reviewed and are negative    FAMILY HISTORY:  FHx: pancreatic cancer (Father)      T(C): 36.2 (11-12-23 @ 14:07), Max: 36.8 (11-12-23 @ 05:05)  HR: 73 (11-12-23 @ 14:07) (65 - 73)  BP: 149/65 (11-12-23 @ 14:07) (114/58 - 149/65)  RR: 18 (11-12-23 @ 14:07) (18 - 18)  SpO2: --  Wt(kg): --Vital Signs Last 24 Hrs  T(C): 36.2 (12 Nov 2023 14:07), Max: 36.8 (12 Nov 2023 05:05)  T(F): 97.1 (12 Nov 2023 14:07), Max: 98.2 (12 Nov 2023 05:05)  HR: 73 (12 Nov 2023 14:07) (65 - 73)  BP: 149/65 (12 Nov 2023 14:07) (114/58 - 149/65)  BP(mean): --  RR: 18 (12 Nov 2023 14:07) (18 - 18)  SpO2: --      morphine (Unknown)      PHYSICAL EXAM:    GEN: No acute distress  HEENT: normocephalic, atraumatic, aniceteric  LUNGS: bl breath sounds   HEART: S1/S2 present. RRR, no murmurs  ABD: Soft, non-tender, non-distended. Bowel sounds present  EXT: warm   NEURO: AAOX3, normal affect    Consultant(s) Notes Reviewed:  [x ] YES  [ ] NO  Care Discussed with Consultants/Other Providers [ x] YES  [ ] NO    LABS:      RADIOLOGY & ADDITIONAL TESTS:    Imaging Personally Reviewed:  [ ] YES  [ ] NO  aspirin enteric coated 81 milliGRAM(s) Oral daily  chlorhexidine 2% Cloths 1 Application(s) Topical <User Schedule>  folic acid 1 milliGRAM(s) Oral daily  influenza  Vaccine (HIGH DOSE) 0.7 milliLiter(s) IntraMuscular once  methotrexate 15 milliGRAM(s) Oral <User Schedule>  pantoprazole    Tablet 40 milliGRAM(s) Oral before breakfast  remdesivir  IVPB   IV Intermittent   remdesivir  IVPB 100 milliGRAM(s) IV Intermittent every 24 hours      HEALTH ISSUES - PROBLEM Dx:    77 y/o female pmhx of RA and brain aneurysm.  Independent, taking care of all ADLs and ambulating without assistance.  Presents to the ED with c/o  recent  dx with covid x 2 days of fever and weakness.   Patient notes she went to bed Sunday feeling well, woke up Monday feeling myalgia, fever and then on Tuesday was general weakness,  attempted to stand and fell, landing on her buttock. She notes she has been unable to stand/walk since.    Patient presently c/o weakness to b/l lower legs left > right .  Patient unable to stand due to weakness x 2 days.  Patient denies any back pain post fall however c/o buttock pain post fall.  Patient s/p left hip pinning a few months ago.  Patient denies any bowel/bladder incontinence. no headache, visual changes, tinnitus.   Denies SOB, Cough, chest pain , difficulty swallowing.    # Covid - 19 diagnosed 11/7 as outpt?  - asymptomatic   - Not on O2, no coughing  - Hold Steroids   - S/p Remdesivir x3 days    # Generalized weakness complicated by mechanical fall on back   possibly 2/2 covid 19 as above   # H/O Back surgery  # H/O b/l hip sx   - Neuro consult appreciated   - CT lumbar spine - Multilevel lumbar spondylosis, worse at L5-S1 with severe left/moderate right foraminal stenosis.  - PT consult - home/ rehab   - denies back pain     # RA- c/w methotrexate and folic acid    # dvt/ gi ppx/diet  # dispo: acute - 24 hrs (PT - home vs rehab)  # handoff: Plan for dc in am      Total time spent to complete patient's bedside assessment, review medical chart, discuss medical plan of care with covering medical team was ____35____ with > 50% of time spent face to face w/ patient, discussion with patient/family and/or coordination of care

## 2023-11-13 ENCOUNTER — TRANSCRIPTION ENCOUNTER (OUTPATIENT)
Age: 78
End: 2023-11-13

## 2023-11-13 DIAGNOSIS — E87.5 HYPERKALEMIA: ICD-10-CM
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
## 2023-11-13 DIAGNOSIS — M48.061 SPINAL STENOSIS, LUMBAR REGION WITHOUT NEUROGENIC CLAUDICATION: ICD-10-CM

## 2023-11-13 LAB
ANION GAP SERPL CALC-SCNC: 10 MMOL/L — SIGNIFICANT CHANGE UP (ref 7–14)
ANION GAP SERPL CALC-SCNC: 10 MMOL/L — SIGNIFICANT CHANGE UP (ref 7–14)
ANION GAP SERPL CALC-SCNC: 11 MMOL/L — SIGNIFICANT CHANGE UP (ref 7–14)
ANION GAP SERPL CALC-SCNC: 11 MMOL/L — SIGNIFICANT CHANGE UP (ref 7–14)
BUN SERPL-MCNC: 21 MG/DL — HIGH (ref 10–20)
BUN SERPL-MCNC: 21 MG/DL — HIGH (ref 10–20)
BUN SERPL-MCNC: 22 MG/DL — HIGH (ref 10–20)
BUN SERPL-MCNC: 22 MG/DL — HIGH (ref 10–20)
CALCIUM SERPL-MCNC: 9 MG/DL — SIGNIFICANT CHANGE UP (ref 8.4–10.5)
CALCIUM SERPL-MCNC: 9 MG/DL — SIGNIFICANT CHANGE UP (ref 8.4–10.5)
CALCIUM SERPL-MCNC: 9.1 MG/DL — SIGNIFICANT CHANGE UP (ref 8.4–10.5)
CALCIUM SERPL-MCNC: 9.1 MG/DL — SIGNIFICANT CHANGE UP (ref 8.4–10.5)
CHLORIDE SERPL-SCNC: 107 MMOL/L — SIGNIFICANT CHANGE UP (ref 98–110)
CO2 SERPL-SCNC: 22 MMOL/L — SIGNIFICANT CHANGE UP (ref 17–32)
CO2 SERPL-SCNC: 22 MMOL/L — SIGNIFICANT CHANGE UP (ref 17–32)
CO2 SERPL-SCNC: 24 MMOL/L — SIGNIFICANT CHANGE UP (ref 17–32)
CO2 SERPL-SCNC: 24 MMOL/L — SIGNIFICANT CHANGE UP (ref 17–32)
CREAT SERPL-MCNC: 0.9 MG/DL — SIGNIFICANT CHANGE UP (ref 0.7–1.5)
EGFR: 65 ML/MIN/1.73M2 — SIGNIFICANT CHANGE UP
GLUCOSE SERPL-MCNC: 118 MG/DL — HIGH (ref 70–99)
GLUCOSE SERPL-MCNC: 118 MG/DL — HIGH (ref 70–99)
GLUCOSE SERPL-MCNC: 84 MG/DL — SIGNIFICANT CHANGE UP (ref 70–99)
GLUCOSE SERPL-MCNC: 84 MG/DL — SIGNIFICANT CHANGE UP (ref 70–99)
HCT VFR BLD CALC: 39.7 % — SIGNIFICANT CHANGE UP (ref 37–47)
HCT VFR BLD CALC: 39.7 % — SIGNIFICANT CHANGE UP (ref 37–47)
HGB BLD-MCNC: 13.2 G/DL — SIGNIFICANT CHANGE UP (ref 12–16)
HGB BLD-MCNC: 13.2 G/DL — SIGNIFICANT CHANGE UP (ref 12–16)
MCHC RBC-ENTMCNC: 32.8 PG — HIGH (ref 27–31)
MCHC RBC-ENTMCNC: 32.8 PG — HIGH (ref 27–31)
MCHC RBC-ENTMCNC: 33.2 G/DL — SIGNIFICANT CHANGE UP (ref 32–37)
MCHC RBC-ENTMCNC: 33.2 G/DL — SIGNIFICANT CHANGE UP (ref 32–37)
MCV RBC AUTO: 98.8 FL — SIGNIFICANT CHANGE UP (ref 81–99)
MCV RBC AUTO: 98.8 FL — SIGNIFICANT CHANGE UP (ref 81–99)
NRBC # BLD: 0 /100 WBCS — SIGNIFICANT CHANGE UP (ref 0–0)
NRBC # BLD: 0 /100 WBCS — SIGNIFICANT CHANGE UP (ref 0–0)
PLATELET # BLD AUTO: 148 K/UL — SIGNIFICANT CHANGE UP (ref 130–400)
PLATELET # BLD AUTO: 148 K/UL — SIGNIFICANT CHANGE UP (ref 130–400)
PMV BLD: 12.7 FL — HIGH (ref 7.4–10.4)
PMV BLD: 12.7 FL — HIGH (ref 7.4–10.4)
POTASSIUM SERPL-MCNC: 4.4 MMOL/L — SIGNIFICANT CHANGE UP (ref 3.5–5)
POTASSIUM SERPL-MCNC: 4.4 MMOL/L — SIGNIFICANT CHANGE UP (ref 3.5–5)
POTASSIUM SERPL-MCNC: 5.5 MMOL/L — HIGH (ref 3.5–5)
POTASSIUM SERPL-MCNC: 5.5 MMOL/L — HIGH (ref 3.5–5)
POTASSIUM SERPL-SCNC: 4.4 MMOL/L — SIGNIFICANT CHANGE UP (ref 3.5–5)
POTASSIUM SERPL-SCNC: 4.4 MMOL/L — SIGNIFICANT CHANGE UP (ref 3.5–5)
POTASSIUM SERPL-SCNC: 5.5 MMOL/L — HIGH (ref 3.5–5)
POTASSIUM SERPL-SCNC: 5.5 MMOL/L — HIGH (ref 3.5–5)
RBC # BLD: 4.02 M/UL — LOW (ref 4.2–5.4)
RBC # BLD: 4.02 M/UL — LOW (ref 4.2–5.4)
RBC # FLD: 12.9 % — SIGNIFICANT CHANGE UP (ref 11.5–14.5)
RBC # FLD: 12.9 % — SIGNIFICANT CHANGE UP (ref 11.5–14.5)
SODIUM SERPL-SCNC: 140 MMOL/L — SIGNIFICANT CHANGE UP (ref 135–146)
SODIUM SERPL-SCNC: 140 MMOL/L — SIGNIFICANT CHANGE UP (ref 135–146)
SODIUM SERPL-SCNC: 141 MMOL/L — SIGNIFICANT CHANGE UP (ref 135–146)
SODIUM SERPL-SCNC: 141 MMOL/L — SIGNIFICANT CHANGE UP (ref 135–146)
WBC # BLD: 4.55 K/UL — LOW (ref 4.8–10.8)
WBC # BLD: 4.55 K/UL — LOW (ref 4.8–10.8)
WBC # FLD AUTO: 4.55 K/UL — LOW (ref 4.8–10.8)
WBC # FLD AUTO: 4.55 K/UL — LOW (ref 4.8–10.8)

## 2023-11-13 PROCEDURE — 99239 HOSP IP/OBS DSCHRG MGMT >30: CPT

## 2023-11-13 RX ADMIN — Medication 1 MILLIGRAM(S): at 11:17

## 2023-11-13 RX ADMIN — Medication 81 MILLIGRAM(S): at 11:17

## 2023-11-13 RX ADMIN — PANTOPRAZOLE SODIUM 40 MILLIGRAM(S): 20 TABLET, DELAYED RELEASE ORAL at 05:38

## 2023-11-13 NOTE — DISCHARGE NOTE PROVIDER - CARE PROVIDERS DIRECT ADDRESSES
,[email or web address]

## 2023-11-13 NOTE — DISCHARGE NOTE PROVIDER - CARE PROVIDER_API CALL
Wilfredo Carolina  Internal Medicine  [address] [place] [id number]  Phone: [phone]  Fax: [phone]  Follow Up Time: 1 week   Wilfredo Carolina  Internal Medicine  [address] [place] [id number]  Phone: [phone]  Fax: [phone]  Follow Up Time: 1 week    Junaid Mcdonald  Neurosurgery  [address] [place] [address], [facility] [place] [address]  [place] [address]  Phone: [phone]  Fax: [phone]  Follow Up Time:

## 2023-11-13 NOTE — DISCHARGE NOTE PROVIDER - NSDCCPCAREPLAN_GEN_ALL_CORE_FT
PRINCIPAL DISCHARGE DIAGNOSIS  Diagnosis: COVID-19  Assessment and Plan of Treatment: Please follow up with PCP within 1 week.      SECONDARY DISCHARGE DIAGNOSES  Diagnosis: Ambulatory dysfunction  Assessment and Plan of Treatment:     Diagnosis: Acute weakness  Assessment and Plan of Treatment:      PRINCIPAL DISCHARGE DIAGNOSIS  Diagnosis: COVID-19  Assessment and Plan of Treatment: Please follow up with PCP within 1 week.  you received 3 doses of remdesivir inpatient   you had no symptoms and did not require any oxygen      SECONDARY DISCHARGE DIAGNOSES  Diagnosis: Ambulatory dysfunction  Assessment and Plan of Treatment: you were seen by physical therapy and opted to go homre with physical therapy

## 2023-11-13 NOTE — DISCHARGE NOTE PROVIDER - PROVIDER TOKENS
PROVIDER:[TOKEN:[[id number]],FOLLOWUP:[1 week]] PROVIDER:[TOKEN:[[id number]],FOLLOWUP:[1 week]],PROVIDER:[TOKEN:[[id number]]]

## 2023-11-13 NOTE — DISCHARGE NOTE PROVIDER - HOSPITAL COURSE
77 y/o female pmhx of RA and brain aneurysm p/w recent dx of covid (dx'ed 11/7/23 as outpatient) c/o fever and weakness. Attempted to stand at home (11/7/23) and fell, landing on her buttock. She notes she has been unable to stand/walk since.   Admitted to medicine for COVID-19 and generalized weakness. Received 3 doses of Remdesivir. CT lumbar spine - Multilevel lumbar spondylosis, worse at L5-S1 with severe left/moderate right foraminal stenosis. Seen by PT, recommended home vs rehab.     Pt medically cleared for d/c home with home health aid.    77 y/o female pmhx of RA and brain aneurysm p/w recent dx of covid (dx'ed 11/7/23 as outpatient) c/o fever and weakness. Attempted to stand at home (11/7/23) and fell, landing on her buttock. She notes she has been unable to stand/walk since.   Admitted to medicine for COVID-19 and generalized weakness. Received 3 doses of Remdesivir. CT lumbar spine - Multilevel lumbar spondylosis, worse at L5-S1 with severe left/moderate right foraminal stenosis. Seen by PT, recommended home vs rehab. pt prefers to go home     attending attestation:  patient seen and examined on day of discharge  labs and vitals reviewed  patient has no complaints  plan of care discussed with patient/family in agreement and understanding

## 2023-11-13 NOTE — DISCHARGE NOTE PROVIDER - NSDCFUSCHEDAPPT_GEN_ALL_CORE_FT
[facility] Physician [facility]  [facility] [address]  Scheduled Appointment: 11/30/2023

## 2023-11-13 NOTE — DISCHARGE NOTE PROVIDER - NSDCMRMEDTOKEN_GEN_ALL_CORE_FT
aspirin 81 mg oral capsule: 1 cap(s) orally 2 times a day  folic acid 1 mg oral tablet: 1 tab(s) orally once a day , except not on Thursdays  methotrexate 2.5 mg oral tablet: 6 tab(s) orally once a week Take it every Thursday MDD: 6 tab  oxyCODONE 5 mg oral tablet: 1 tab(s) orally 4 times a day as needed for  moderate pain MDD: 4  pantoprazole 40 mg oral delayed release tablet: 1 tab(s) orally once a day (before a meal)

## 2023-11-13 NOTE — DISCHARGE NOTE NURSING/CASE MANAGEMENT/SOCIAL WORK - PATIENT PORTAL LINK FT
You can access the FollowMyHealth Patient Portal offered by [facility] by registering at the following website: [email or web address]. By joining [employer]’s FollowMyHealth portal, you will also be able to view your health information using other applications (apps) compatible with our system.

## 2023-11-16 DIAGNOSIS — Z79.82 LONG TERM (CURRENT) USE OF ASPIRIN: ICD-10-CM

## 2023-11-16 DIAGNOSIS — M48.07 SPINAL STENOSIS, LUMBOSACRAL REGION: ICD-10-CM

## 2023-11-16 DIAGNOSIS — M47.817 SPONDYLOSIS WITHOUT MYELOPATHY OR RADICULOPATHY, LUMBOSACRAL REGION: ICD-10-CM

## 2023-11-16 DIAGNOSIS — U07.1 COVID-19: ICD-10-CM

## 2023-11-16 DIAGNOSIS — Z88.5 ALLERGY STATUS TO NARCOTIC AGENT: ICD-10-CM
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
## 2023-11-16 DIAGNOSIS — M54.50 LOW BACK PAIN, UNSPECIFIED: ICD-10-CM

## 2023-11-16 DIAGNOSIS — M06.9 RHEUMATOID ARTHRITIS, UNSPECIFIED: ICD-10-CM

## 2023-11-16 DIAGNOSIS — K21.9 GASTRO-ESOPHAGEAL REFLUX DISEASE WITHOUT ESOPHAGITIS: ICD-10-CM

## 2023-12-07 ENCOUNTER — APPOINTMENT (OUTPATIENT)
Dept: ORTHOPEDIC SURGERY | Facility: CLINIC | Age: 78
End: 2023-12-07

## 2023-12-08 ENCOUNTER — APPOINTMENT (OUTPATIENT)
Dept: ORTHOPEDIC SURGERY | Facility: CLINIC | Age: 78
End: 2023-12-08
Payer: MEDICARE

## 2023-12-08 DIAGNOSIS — Z96.641 PRESENCE OF RIGHT ARTIFICIAL HIP JOINT: ICD-10-CM

## 2023-12-08 PROCEDURE — 73502 X-RAY EXAM HIP UNI 2-3 VIEWS: CPT

## 2023-12-08 PROCEDURE — 99213 OFFICE O/P EST LOW 20 MIN: CPT

## 2023-12-08 RX ORDER — NAPROXEN 500 MG/1
500 TABLET ORAL
Qty: 60 | Refills: 0 | Status: ACTIVE | COMMUNITY
Start: 2023-12-08 | End: 1900-01-01

## 2024-01-19 ENCOUNTER — APPOINTMENT (OUTPATIENT)
Dept: ORTHOPEDIC SURGERY | Facility: CLINIC | Age: 79
End: 2024-01-19

## 2024-01-19 NOTE — PROGRESS NOTE ADULT - ATTENDING COMMENTS
Hospital Medicine Student   Progress Note  [place] [place] 
I reviewed the chart and examined the patient with the resident and we discussed the findings and treatment plan.  The patient is tolerating the rehab program well. I agree with the findings and treatment plan above, which I modified as indicated. The patient requires 3 hrs a day of acute inpatient rehab. No new complaints. C/o ongoing left groin pain with walking. Started on Ibuprofen 600mg q am and Standing Tylenol 975 mg q 8 hrs. Tolerating therapies and walking with CG with RW. VSS. Continue rehab program.    I read, edited and agree with the Assessment:  #Rehab of Fall s/p L femoral neck fracture s/p closed reduction and percutaneous pinning of left hip on 8/14.  - Patient requires a full rehab program of 3 hours a day of PT, OT  -orthopedics following  - patient c/o ongoing left hip/ groin pain with ROM/ walking.  F/U x-ray with no displacement of pins  - Patient requesting Ibuprofen 600mg daily which she takes at home. Ordered.  - continue Oxycone q4hr prn mod pain; wean off narcotics as appropriate  - Acetominophen changed to 975 mg q 8 hrs. standing  - Weight bearing: WBAT LLE with walker    #Normocytic anemia; likely 2/2 postop blood loss.  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 after discharge    #Constipation (resolving)  Not associated with abdominal pain.  - c/w miralax, senna  - s/p bisacodyl  - had BMs. Monitor on current meds    #Misc  -GI/Bowel Mgmt: as above, protonix 40mg qAM  - Melatonin 3md qhs   -Skin: monitor incision    - Diet: Regular diet        Precautions / PROPHYLAXIS:      - Falls, safety    - Ortho: Weight bearing status: WBAT LLE with walker      - DVT prophylaxis: SQH 5000u q8hr ; on d/c aspirin 81 mg BID
Rehab of left hip fx, s/p CRPP. Pt seen and examined with the resident. The treatment plan discussed. Agree with the above.
I reviewed the chart and examined the patient with the resident and we discussed the findings and treatment plan.  The patient is tolerating the rehab program well. I agree with the findings and treatment plan above, which I modified as indicated. The patient requires 3 hrs a day of acute inpatient rehab. Doing well. Pain improving slowly. Ambulating increased distances with RW and CG. Medically stable. D/c planned for Thurs.     I read, edited and agree with the Assessment.
I reviewed the chart and examined the patient with the resident and we discussed the findings and treatment plan.  The patient is tolerating the rehab program well. I agree with the findings and treatment plan above, which I modified as indicated. The patient requires 3 hrs a day of acute inpatient rehab. c/o ongoing 8/10 left hip pain with ambulation. Repeat x-ray negative for new finding. Adding Ibuprofen 600mg with breakfast as per patient request. Continue full rehab program.    I read, edited and agree with the Assessment:  #Rehab of Fall s/p L femoral neck fracture s/p closed reduction and percutaneous pinning of left hip on 8/14.  - Patient requires a full rehab program of 3 hours a day of PT, OT  -orthopedics following  - patient c/o ongoing left hip pain with ROM/ walking.  F/U x-ray with no displacement of pins  - Patient requesting Ibuprofen 600mg daily which she takes at home. Ordered.  - continue percocet 5-325 q4hr prn mod pain; wean off narcotics as appropriate  - Weight bearing: WBAT LLE with walker    #Normocytic anemia; likely 2/2 postop blood loss.  - improved    # Transaminitis; resolving  - possibly drug induced- normalized as of 8/17  - no abdominal pain on exam  - continue to monitor LFTs as pt is on percocet    #Thrombocytopenia  - resolved  - monitor    #Hx of Rheumatoid arthritis  The patient is in an immunosuppressed state as expected to be associated with methotrexate therapy   -Continue with home methotrexate 50mg q1week and folic acid    #Hx of CKD stage IIIa eGFR running 65-75  -Cr 0.9   - continue to monitor renal function    #5.5mm right posterior communicating artery aneurysm   - strict BP control, maintain SBP<160  - Pt planned to follow up [facility]RA and with management at [facility] after discharge    #Constipation   Not associated with abdominal pain.  - c/w miralax, senna  - s/p bisacodyl,   - had BMs. Monitor on current meds    #Misc  -GI/Bowel Mgmt: as above, protonix 40mg qAM  - Melatonin 3md qhs   -Skin: monitor incision    - Diet: Regular diet        Precautions / PROPHYLAXIS:      - Falls, safety    - Ortho: Weight bearing status: WBAT LLE with walker      - DVT prophylaxis: SQH 5000u q8hr ; on d/c aspirin 81 mg BID
PTN WAS SEEN AND EXAM AT BED SIDE WITH REHAB RESIDENT D/W RESIDENT PTN CARE   I HAVE DIRECT THE PTN CARE .
Patient seen and examined with the resident. We discussed the case. I have directed the care. I edited the note. The patient requires acute rehab with 3 hours of daily therapies at least 5 out of 7 days and close physiatry follow up. She is progressing in therapies.  #Rehab of Fall s/p L femoral neck fracture s/p closed reduction and percutaneous pinning of left hip on 8/14.  - Patient requires a full rehab program of 3 hours a day of PT, OT  -orthopedics following  - patient c/o ongoing left hip/ groin pain with ROM/ walking.  F/U x-ray with no displacement of pins  - Patient requesting Ibuprofen 600mg daily which she takes at home. Ordered  - c/w ibuprofen 600 mg po daily with breakfast  - c/w Oxycodone 5 mg q4hr prn mod pain; wean off narcotics as appropriate  - Acetominophen changed to 1000 mg q 8 hrs. standing  - Weight bearing: WBAT LLE with walker    #Normocytic anemia; likely 2/2 postop blood loss.  - improved    # Transaminitis; resolving  - possibly drug induced- normalized as of 8/17  - no abdominal pain on exam  - continue to monitor LFTs as pt is on percocet    #Thrombocytopenia  - resolved  - monitor    #Hx of Rheumatoid arthritis  The patient is in an immunosuppressed state as expected to be associated with methotrexate therapy   -Continue with home methotrexate 50mg q1week and folic acid    #Hx of CKD stage IIIa eGFR running 65-75  - Cr 0.9   - continue to monitor renal function    #5.5mm right posterior communicating artery aneurysm   - strict BP control, maintain SBP<160  - Pt planned [facility]  - avoid high BPs    #Constipation (resolving)  Not associated with abdominal pain.  - c/w miralax, senna  - s/p bisacodyl  - had BMs. Monitor on current meds    #Misc  -GI/Bowel Mgmt: as above, protonix 40mg qAM  - Melatonin 3md qhs   -Skin: monitor incision    - Diet: Regular diet
Patient seen and examined with the resident. We discussed the case. I have directed the care. I edited the note. The patient requires acute rehab with 3 hours of daily therapies at least 5 out of 7 days and close physiatry follow up. A double Dulcolax, a tab and suppository for constipation was ordered today. She is progressing  in therapies.  #Rehab of Fall s/p L femoral neck fracture s/p closed reduction and percutaneous pinning of left hip on 8/14.  - Patient requires a full rehab program of 3 hours a day of PT, OT  Physiatry to address: Ambulatory dysfunction, Medical condition, ADLs  Physical therapy to address: Ambulation and transfers, balance and strength  Occupational therapy to address: ADLs and functional transfers  Social work to address: Home services and barriers to discharge  Case Management/Care coordination to address: Home services and barriers to discharge  Rehab Nursing to address: Individualized bedside care  Precautions/restrictions: Safety/Fall precautions  -orthopedics following  - completed post op prophylactic Ancef for 24 hours   - percocet 5-325 q4hr prn mod pain; wean off narcotics as appropriate  - Weight bearing: WBAT LLE with walker    #Normocytic anemia; likely 2/2 postop blood loss.  - continue to monitor CBC    # Transaminitis; resolving  possibly drug induced- normalized as of 8/17  - no abdominal pain on exam  - continue to monitor LFTs as pt is on percocet    #Thrombocytopenia  - appears stable 112K  - monitor  - get HIT panel if worsens    #Hx of Rheumatoid arthritis  The patient is in an immunosuppressed state as expected to be associated with methotrexate therapy   -Continue with home methotrexate 50mg q1week and folic acid    #Hx of CKD stage IIIa? eGFR running 65-75  -Cr 0.9 on 8/17  - continue to monitor renal function    #5.5mm right posterior communicating artery aneurysm   - strict BP control, maintain SBP<160  - Pt planned to follow up [facility]RA and with management at [facility] after discharge    #Constipation - no BM since admission  Not associated with abdominal pain.  - c/w miralax, senna  - s/p bisacodyl,   - to order bisacodyl suppository 8/19, consider enema tomorrow if continues to persist.   - continue to monitor    #Misc  -GI/Bowel Mgmt: as above, protonix 40mg qAM  - Melatonin 3md qhs   -Skin: monitor incision    - Diet: Regular diet        Precautions / PROPHYLAXIS:      - Falls, safety    - Ortho: Weight bearing status: WBAT LLE with walker      - DVT prophylaxis: SQH 5000u q8hr ; on d/c aspirin 81 mg BID
I reviewed the chart and examined the patient with the resident and we discussed the findings and treatment plan.  The patient is tolerating the rehab program well. I agree with the findings and treatment plan above, which I modified as indicated. The patient requires 3 hrs a day of acute inpatient rehab. No new complaints. Notes ongoing left groin pain. VSS. Labs stable. Ambulates with RW with CG. Continue full rehab program.     I read, edited and agree with the Assessment:  #Rehab of Fall s/p L femoral neck fracture s/p closed reduction and percutaneous pinning of left hip on 8/14.  - Patient requires a full rehab program of 3 hours a day of PT, OT  -orthopedics following  - patient c/o ongoing left hip/ groin pain with ROM/ walking.  F/U x-ray with no displacement of pins  - Patient requesting Ibuprofen 600mg daily which she takes at home. Ordered  - c/w ibuprofen 600 mg po daily with breakfast  - c/w Oxycodone 5 mg q4hr prn mod pain; wean off narcotics as appropriate  - Acetominophen changed to 1000 mg q 8 hrs. standing  - Weight bearing: WBAT LLE with walker  - continue full rehab program    #s/p Normocytic anemia; likely 2/2 postop blood loss.  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 after discharge  - avoid high BPs    #Constipation (resolving)  Not associated with abdominal pain.  - c/w miralax, senna  - s/p bisacodyl  - had BMs. Monitor on current meds    #Misc  -GI/Bowel Mgmt: as above, protonix 40mg qAM  - Melatonin 3md qhs   -Skin: monitor incision    - Diet: Regular diet
I reviewed the chart and examined the patient with the resident and we discussed the findings and treatment plan.  The patient is tolerating the rehab program well. I agree with the findings and treatment plan above, which I modified as indicated. The patient requires 3 hrs a day of acute inpatient rehab. No new complaints. VSS. Pain slowly improving. Walking with RW with supervision. Antic d/c home in am.    I read, edited and agree with the Assessment:  #Rehab of Fall s/p L femoral neck fracture s/p closed reduction and percutaneous pinning of left hip on 8/14.  - Patient requires a full rehab program of 3 hours a day of PT, OT  -orthopedics following  - patient c/o ongoing left hip/ groin pain with ROM/ walking.  F/U x-ray with no displacement of pins  - Patient requesting Ibuprofen 600mg daily which she takes at home. Ordered  - c/w ibuprofen 600 mg po daily with breakfast  - c/w Oxycodone 5 mg q4hr prn mod pain; wean off narcotics as appropriate  - Acetominophen changed to 1000 mg q 8 hrs. standing  - Weight bearing: WBAT LLE with walker  - DC tomorrow    #s/p Normocytic anemia; likely 2/2 postop blood loss.  - improved    # Transaminitis; resolved  - possibly drug induced- normalized as of 8/17  - no abdominal pain on exam    #Thrombocytopenia  - resolved  - monitor    #Hx of Rheumatoid arthritis  The patient is in an immunosuppressed state as expected to be associated with methotrexate therapy   -Continue with home methotrexate 50mg q1week and folic acid    #Hx of CKD stage IIIa eGFR running 65-75  - Cr 0.9   - continue to monitor renal function  - DC tomorrow    #5.5mm right posterior communicating artery aneurysm   - strict BP control, maintain SBP<160  - Pt planned to follow up [facility]RA and with management at [facility] after discharge  - avoid high BPs    #Constipation (resolving)  Not associated with abdominal pain.  - c/w miralax, senna  - s/p bisacodyl  - had BMs. Monitor on current meds

## 2024-01-23 ENCOUNTER — APPOINTMENT (OUTPATIENT)
Dept: ORTHOPEDIC SURGERY | Facility: CLINIC | Age: 79
End: 2024-01-23
Payer: MEDICARE

## 2024-01-23 DIAGNOSIS — S72.009A FRACTURE OF UNSPECIFIED PART OF NECK OF UNSPECIFIED FEMUR, INITIAL ENCOUNTER FOR CLOSED FRACTURE: ICD-10-CM

## 2024-01-23 PROCEDURE — 99211 OFF/OP EST MAY X REQ PHY/QHP: CPT

## 2024-01-23 NOTE — DISCUSSION/SUMMARY
[de-identified] : Patient is doing well at this point.  Advised continuation of physical therapy.  Patient will follow-up in 6 weeks for reevaluation.  Call for questions or concerns.  Patient understands agrees with plan.

## 2024-01-23 NOTE — IMAGING
[de-identified] : Left hip exam: No effusion, no ecchymosis, no erythema, surgical incision site intact, no sign of infection, no tenderness palpation, good range of motion with no pain, good strength, neurovascular intact

## 2024-01-23 NOTE — HISTORY OF PRESENT ILLNESS
[de-identified] : Patient is a 78-year-old female status post left hip CRPP with Dr. Larios.  Patient states she has been having mild stiffness to the left quadriceps with no pain.  Patient has been working on this with physical therapy.  Denies numbness or tingling, denies instability.

## 2024-01-26 NOTE — PATIENT PROFILE ADULT - MEDICATIONS/VISITS
Surgeon (Optional): Stephany Biopsy Photograph Reviewed: Yes Accession #: [id number] Pathology Comment (Optional): See previous pathology-check margins-marking suture: 12 o’clock right Date Of Previous Biopsy (Optional): 12/22/2023 Size Of Lesion In Cm: 0.6 Size Of Margin In Cm: 0.4 Anesthesia Volume In Cc: 6 Was An Eye Clamp Used?: No Eye Clamp Note Details: An eye clamp was used during the procedure. Excision Method: Elliptical Saucerization Depth: dermis and superficial adipose tissue Repair Type: Intermediate Intermediate / Complex Repair - Final Wound Length In Cm: 3.5 Suturegard Retention Suture: 2-0 Nylon Retention Suture Bite Size: 3 mm Length To Time In Minutes Device Was In Place: 10 Number Of Hemigard Strips Per Side: 1 Undermining Type: Entire Wound Debridement Text: The wound edges were debrided prior to proceeding with the closure to facilitate wound healing. Helical Rim Text: The closure involved the helical rim. Vermilion Border Text: The closure involved the vermilion border. Nostril Rim Text: The closure involved the nostril rim. Retention Suture Text: Retention sutures were placed to support the closure and prevent dehiscence. Primary Defect Length (In Cm): 0 Suture Removal: 11 days Lab Facility: [id number] Graft Donor Site Bandage (Optional-Leave Blank If You Don't Want In Note): Steri-strips and a pressure bandage were applied to the donor site. Epidermal Closure Graft Donor Site (Optional): simple interrupted Billing Type: Third-Party Bill Excision Depth: adipose tissue Scalpel Size: 15 blade Anesthesia Type: 1% lidocaine with epinephrine and a 1:10 solution of 8.4% sodium bicarbonate Additional Anesthesia Volume In Cc: 2 Hemostasis: Electrocautery Estimated Blood Loss (Cc): less than 5 cc Detail Level: Detailed Repair Anesthesia Method: local infiltration Deep Sutures: 4-0 Monocryl Epidermal Sutures: 4-0 Prolene Epidermal Closure: running subcuticular Wound Care: Polysporin ointment Dressing: pressure dressing Suturegard Intro: Intraoperative tissue expansion was performed, utilizing the SUTUREGARD device, in order to reduce wound tension. Suturegard Body: The suture ends were repeatedly re-tightened and re-clamped to achieve the desired tissue expansion. Hemigard Intro: Due to skin fragility and wound tension, it was decided to use HEMIGARD adhesive retention suture devices to permit a linear closure. The skin was cleaned and dried for a 6cm distance away from the wound. Excessive hair, if present, was removed to allow for adhesion. Hemigard Postcare Instructions: The HEMIGARD strips are to remain completely dry for at least 5-7 days. Positioning (Leave Blank If You Do Not Want): The patient was placed in a comfortable position exposing the surgical site. Pre-Excision Curettage Text (Leave Blank If You Do Not Want): Prior to drawing the surgical margin the visible lesion was removed with electrodesiccation and curettage to clearly define the lesion size. Complex Repair Preamble Text (Leave Blank If You Do Not Want): Extensive wide undermining was performed. Intermediate Repair Preamble Text (Leave Blank If You Do Not Want): Undermining was performed with blunt dissection. Curvilinear Excision Additional Text (Leave Blank If You Do Not Want): The margin was drawn around the clinically apparent lesion.  A curvilinear shape was then drawn on the skin incorporating the lesion and margins.  Incisions were then made along these lines to the appropriate tissue plane and the lesion was extirpated. Fusiform Excision Additional Text (Leave Blank If You Do Not Want): The margin was drawn around the clinically apparent lesion.  A fusiform shape was then drawn on the skin incorporating the lesion and margins.  Incisions were then made along these lines to the appropriate tissue plane and the lesion was extirpated. Elliptical Excision Additional Text (Leave Blank If You Do Not Want): The margin was drawn around the clinically apparent lesion.  An elliptical shape was then drawn on the skin incorporating the lesion and margins.  Incisions were then made along these lines to the appropriate tissue plane and the lesion was extirpated. Saucerization Excision Additional Text (Leave Blank If You Do Not Want): The margin was drawn around the clinically apparent lesion.  Incisions were then made along these lines, in a tangential fashion, to the appropriate tissue plane and the lesion was extirpated. Slit Excision Additional Text (Leave Blank If You Do Not Want): A linear line was drawn on the skin overlying the lesion. An incision was made slowly until the lesion was visualized.  Once visualized, the lesion was removed with blunt dissection. Excisional Biopsy Additional Text (Leave Blank If You Do Not Want): The margin was drawn around the clinically apparent lesion. An elliptical shape was then drawn on the skin incorporating the lesion and margins.  Incisions were then made along these lines to the appropriate tissue plane and the lesion was extirpated. Perilesional Excision Additional Text (Leave Blank If You Do Not Want): The margin was drawn around the clinically apparent lesion. Incisions were then made along these lines to the appropriate tissue plane and the lesion was extirpated. Repair Performed By Another Provider Text (Leave Blank If You Do Not Want): After the tissue was excised the defect was repaired by another provider. No Repair - Repaired With Adjacent Surgical Defect Text (Leave Blank If You Do Not Want): After the excision the defect was repaired concurrently with another surgical defect which was in close approximation. Adjacent Tissue Transfer Text: The defect edges were debeveled with a #15 scalpel blade.  Given the location of the defect and the proximity to free margins an adjacent tissue transfer was deemed most appropriate.  Using a sterile surgical marker, an appropriate flap was drawn incorporating the defect and placing the expected incisions within the relaxed skin tension lines where possible.    The area thus outlined was incised deep to adipose tissue with a #15 scalpel blade.  The skin margins were undermined to an appropriate distance in all directions utilizing iris scissors. Advancement Flap (Single) Text: The defect edges were debeveled with a #15 scalpel blade.  Given the location of the defect and the proximity to free margins a single advancement flap was deemed most appropriate.  Using a sterile surgical marker, an appropriate advancement flap was drawn incorporating the defect and placing the expected incisions within the relaxed skin tension lines where possible.    The area thus outlined was incised deep to adipose tissue with a #15 scalpel blade.  The skin margins were undermined to an appropriate distance in all directions utilizing iris scissors. Advancement Flap (Double) Text: The defect edges were debeveled with a #15 scalpel blade.  Given the location of the defect and the proximity to free margins a double advancement flap was deemed most appropriate.  Using a sterile surgical marker, the appropriate advancement flaps were drawn incorporating the defect and placing the expected incisions within the relaxed skin tension lines where possible.    The area thus outlined was incised deep to adipose tissue with a #15 scalpel blade.  The skin margins were undermined to an appropriate distance in all directions utilizing iris scissors. Burow's Advancement Flap Text: The defect edges were debeveled with a #15 scalpel blade.  Given the location of the defect and the proximity to free margins a Burow's advancement flap was deemed most appropriate.  Using a sterile surgical marker, the appropriate advancement flap was drawn incorporating the defect and placing the expected incisions within the relaxed skin tension lines where possible.    The area thus outlined was incised deep to adipose tissue with a #15 scalpel blade.  The skin margins were undermined to an appropriate distance in all directions utilizing iris scissors. Chonodrocutaneous Helical Advancement Flap Text: The defect edges were debeveled with a #15 scalpel blade.  Given the location of the defect and the proximity to free margins a chondrocutaneous helical advancement flap was deemed most appropriate.  Using a sterile surgical marker, the appropriate advancement flap was drawn incorporating the defect and placing the expected incisions within the relaxed skin tension lines where possible.    The area thus outlined was incised deep to adipose tissue with a #15 scalpel blade.  The skin margins were undermined to an appropriate distance in all directions utilizing iris scissors. Crescentic Advancement Flap Text: The defect edges were debeveled with a #15 scalpel blade.  Given the location of the defect and the proximity to free margins a crescentic advancement flap was deemed most appropriate.  Using a sterile surgical marker, the appropriate advancement flap was drawn incorporating the defect and placing the expected incisions within the relaxed skin tension lines where possible.    The area thus outlined was incised deep to adipose tissue with a #15 scalpel blade.  The skin margins were undermined to an appropriate distance in all directions utilizing iris scissors. A-T Advancement Flap Text: The defect edges were debeveled with a #15 scalpel blade.  Given the location of the defect, shape of the defect and the proximity to free margins an A-T advancement flap was deemed most appropriate.  Using a sterile surgical marker, an appropriate advancement flap was drawn incorporating the defect and placing the expected incisions within the relaxed skin tension lines where possible.    The area thus outlined was incised deep to adipose tissue with a #15 scalpel blade.  The skin margins were undermined to an appropriate distance in all directions utilizing iris scissors. O-T Advancement Flap Text: The defect edges were debeveled with a #15 scalpel blade.  Given the location of the defect, shape of the defect and the proximity to free margins an O-T advancement flap was deemed most appropriate.  Using a sterile surgical marker, an appropriate advancement flap was drawn incorporating the defect and placing the expected incisions within the relaxed skin tension lines where possible.    The area thus outlined was incised deep to adipose tissue with a #15 scalpel blade.  The skin margins were undermined to an appropriate distance in all directions utilizing iris scissors. O-L Flap Text: The defect edges were debeveled with a #15 scalpel blade.  Given the location of the defect, shape of the defect and the proximity to free margins an O-L flap was deemed most appropriate.  Using a sterile surgical marker, an appropriate advancement flap was drawn incorporating the defect and placing the expected incisions within the relaxed skin tension lines where possible.    The area thus outlined was incised deep to adipose tissue with a #15 scalpel blade.  The skin margins were undermined to an appropriate distance in all directions utilizing iris scissors. O-Z Flap Text: The defect edges were debeveled with a #15 scalpel blade.  Given the location of the defect, shape of the defect and the proximity to free margins an O-Z flap was deemed most appropriate.  Using a sterile surgical marker, an appropriate transposition flap was drawn incorporating the defect and placing the expected incisions within the relaxed skin tension lines where possible. The area thus outlined was incised deep to adipose tissue with a #15 scalpel blade.  The skin margins were undermined to an appropriate distance in all directions utilizing iris scissors. Double O-Z Flap Text: The defect edges were debeveled with a #15 scalpel blade.  Given the location of the defect, shape of the defect and the proximity to free margins a Double O-Z flap was deemed most appropriate.  Using a sterile surgical marker, an appropriate transposition flap was drawn incorporating the defect and placing the expected incisions within the relaxed skin tension lines where possible. The area thus outlined was incised deep to adipose tissue with a #15 scalpel blade.  The skin margins were undermined to an appropriate distance in all directions utilizing iris scissors. V-Y Flap Text: The defect edges were debeveled with a #15 scalpel blade.  Given the location of the defect, shape of the defect and the proximity to free margins a V-Y flap was deemed most appropriate.  Using a sterile surgical marker, an appropriate advancement flap was drawn incorporating the defect and placing the expected incisions within the relaxed skin tension lines where possible.    The area thus outlined was incised deep to adipose tissue with a #15 scalpel blade.  The skin margins were undermined to an appropriate distance in all directions utilizing iris scissors. Advancement-Rotation Flap Text: The defect edges were debeveled with a #15 scalpel blade.  Given the location of the defect, shape of the defect and the proximity to free margins an advancement-rotation flap was deemed most appropriate.  Using a sterile surgical marker, an appropriate flap was drawn incorporating the defect and placing the expected incisions within the relaxed skin tension lines where possible. The area thus outlined was incised deep to adipose tissue with a #15 scalpel blade.  The skin margins were undermined to an appropriate distance in all directions utilizing iris scissors. Mercedes Flap Text: The defect edges were debeveled with a #15 scalpel blade.  Given the location of the defect, shape of the defect and the proximity to free margins a Mercedes flap was deemed most appropriate.  Using a sterile surgical marker, an appropriate advancement flap was drawn incorporating the defect and placing the expected incisions within the relaxed skin tension lines where possible. The area thus outlined was incised deep to adipose tissue with a #15 scalpel blade.  The skin margins were undermined to an appropriate distance in all directions utilizing iris scissors. Modified Advancement Flap Text: The defect edges were debeveled with a #15 scalpel blade.  Given the location of the defect, shape of the defect and the proximity to free margins a modified advancement flap was deemed most appropriate.  Using a sterile surgical marker, an appropriate advancement flap was drawn incorporating the defect and placing the expected incisions within the relaxed skin tension lines where possible.    The area thus outlined was incised deep to adipose tissue with a #15 scalpel blade.  The skin margins were undermined to an appropriate distance in all directions utilizing iris scissors. Mucosal Advancement Flap Text: Given the location of the defect, shape of the defect and the proximity to free margins a mucosal advancement flap was deemed most appropriate. Incisions were made with a 15 blade scalpel in the appropriate fashion along the cutaneous vermilion border and the mucosal lip. The remaining actinically damaged mucosal tissue was excised.  The mucosal advancement flap was then elevated to the gingival sulcus with care taken to preserve the neurovascular structures and advanced into the primary defect. Care was taken to ensure that precise realignment of the vermilion border was achieved. Peng Advancement Flap Text: The defect edges were debeveled with a #15 scalpel blade.  Given the location of the defect, shape of the defect and the proximity to free margins a Peng advancement flap was deemed most appropriate.  Using a sterile surgical marker, an appropriate advancement flap was drawn incorporating the defect and placing the expected incisions within the relaxed skin tension lines where possible. The area thus outlined was incised deep to adipose tissue with a #15 scalpel blade.  The skin margins were undermined to an appropriate distance in all directions utilizing iris scissors. Hatchet Flap Text: The defect edges were debeveled with a #15 scalpel blade.  Given the location of the defect, shape of the defect and the proximity to free margins a hatchet flap was deemed most appropriate.  Using a sterile surgical marker, an appropriate hatchet flap was drawn incorporating the defect and placing the expected incisions within the relaxed skin tension lines where possible.    The area thus outlined was incised deep to adipose tissue with a #15 scalpel blade.  The skin margins were undermined to an appropriate distance in all directions utilizing iris scissors. no Rotation Flap Text: The defect edges were debeveled with a #15 scalpel blade.  Given the location of the defect, shape of the defect and the proximity to free margins a rotation flap was deemed most appropriate.  Using a sterile surgical marker, an appropriate rotation flap was drawn incorporating the defect and placing the expected incisions within the relaxed skin tension lines where possible.    The area thus outlined was incised deep to adipose tissue with a #15 scalpel blade.  The skin margins were undermined to an appropriate distance in all directions utilizing iris scissors. Bilateral Rotation Flap Text: The defect edges were debeveled with a #15 scalpel blade. Given the location of the defect, shape of the defect and the proximity to free margins a bilateral rotation flap was deemed most appropriate. Using a sterile surgical marker, an appropriate rotation flap was drawn incorporating the defect and placing the expected incisions within the relaxed skin tension lines where possible. The area thus outlined was incised deep to adipose tissue with a #15 scalpel blade. The skin margins were undermined to an appropriate distance in all directions utilizing iris scissors. Following this, the designed flap was carried over into the primary defect and sutured into place. Spiral Flap Text: The defect edges were debeveled with a #15 scalpel blade.  Given the location of the defect, shape of the defect and the proximity to free margins a spiral flap was deemed most appropriate.  Using a sterile surgical marker, an appropriate rotation flap was drawn incorporating the defect and placing the expected incisions within the relaxed skin tension lines where possible. The area thus outlined was incised deep to adipose tissue with a #15 scalpel blade.  The skin margins were undermined to an appropriate distance in all directions utilizing iris scissors. Staged Advancement Flap Text: The defect edges were debeveled with a #15 scalpel blade.  Given the location of the defect, shape of the defect and the proximity to free margins a staged advancement flap was deemed most appropriate.  Using a sterile surgical marker, an appropriate advancement flap was drawn incorporating the defect and placing the expected incisions within the relaxed skin tension lines where possible. The area thus outlined was incised deep to adipose tissue with a #15 scalpel blade.  The skin margins were undermined to an appropriate distance in all directions utilizing iris scissors. Star Wedge Flap Text: The defect edges were debeveled with a #15 scalpel blade.  Given the location of the defect, shape of the defect and the proximity to free margins a star wedge flap was deemed most appropriate.  Using a sterile surgical marker, an appropriate rotation flap was drawn incorporating the defect and placing the expected incisions within the relaxed skin tension lines where possible. The area thus outlined was incised deep to adipose tissue with a #15 scalpel blade.  The skin margins were undermined to an appropriate distance in all directions utilizing iris scissors. Transposition Flap Text: The defect edges were debeveled with a #15 scalpel blade.  Given the location of the defect and the proximity to free margins a transposition flap was deemed most appropriate.  Using a sterile surgical marker, an appropriate transposition flap was drawn incorporating the defect.    The area thus outlined was incised deep to adipose tissue with a #15 scalpel blade.  The skin margins were undermined to an appropriate distance in all directions utilizing iris scissors. Muscle Hinge Flap Text: The defect edges were debeveled with a #15 scalpel blade.  Given the size, depth and location of the defect and the proximity to free margins a muscle hinge flap was deemed most appropriate.  Using a sterile surgical marker, an appropriate hinge flap was drawn incorporating the defect. The area thus outlined was incised with a #15 scalpel blade.  The skin margins were undermined to an appropriate distance in all directions utilizing iris scissors. Mustarde Flap Text: The defect edges were debeveled with a #15 scalpel blade.  Given the size, depth and location of the defect and the proximity to free margins a Mustarde flap was deemed most appropriate.  Using a sterile surgical marker, an appropriate flap was drawn incorporating the defect. The area thus outlined was incised with a #15 scalpel blade.  The skin margins were undermined to an appropriate distance in all directions utilizing iris scissors. Nasal Turnover Hinge Flap Text: The defect edges were debeveled with a #15 scalpel blade.  Given the size, depth, location of the defect and the defect being full thickness a nasal turnover hinge flap was deemed most appropriate.  Using a sterile surgical marker, an appropriate hinge flap was drawn incorporating the defect. The area thus outlined was incised with a #15 scalpel blade. The flap was designed to recreate the nasal mucosal lining and the alar rim. The skin margins were undermined to an appropriate distance in all directions utilizing iris scissors. Nasalis-Muscle-Based Myocutaneous Island Pedicle Flap Text: Using a #15 blade, an incision was made around the donor flap to the level of the nasalis muscle. Wide lateral undermining was then performed in both the subcutaneous plane above the nasalis muscle, and in a submuscular plane just above periosteum. This allowed the formation of a free nasalis muscle axial pedicle (based on the angular artery) which was still attached to the actual cutaneous flap, increasing its mobility and vascular viability. Hemostasis was obtained with pinpoint electrocoagulation. The flap was mobilized into position and the pivotal anchor points positioned and stabilized with buried interrupted sutures. Subcutaneous and dermal tissues were closed in a multilayered fashion with sutures. Tissue redundancies were excised, and the epidermal edges were apposed without significant tension and sutured with sutures. Orbicularis Oris Muscle Flap Text: The defect edges were debeveled with a #15 scalpel blade.  Given that the defect affected the competency of the oral sphincter an obicularis oris muscle flap was deemed most appropriate to restore this competency and normal muscle function.  Using a sterile surgical marker, an appropriate flap was drawn incorporating the defect. The area thus outlined was incised with a #15 scalpel blade. Melolabial Transposition Flap Text: The defect edges were debeveled with a #15 scalpel blade.  Given the location of the defect and the proximity to free margins a melolabial flap was deemed most appropriate.  Using a sterile surgical marker, an appropriate melolabial transposition flap was drawn incorporating the defect.    The area thus outlined was incised deep to adipose tissue with a #15 scalpel blade.  The skin margins were undermined to an appropriate distance in all directions utilizing iris scissors. Rhombic Flap Text: The defect edges were debeveled with a #15 scalpel blade.  Given the location of the defect and the proximity to free margins a rhombic flap was deemed most appropriate.  Using a sterile surgical marker, an appropriate rhombic flap was drawn incorporating the defect.    The area thus outlined was incised deep to adipose tissue with a #15 scalpel blade.  The skin margins were undermined to an appropriate distance in all directions utilizing iris scissors. Rhomboid Transposition Flap Text: The defect edges were debeveled with a #15 scalpel blade.  Given the location of the defect and the proximity to free margins a rhomboid transposition flap was deemed most appropriate.  Using a sterile surgical marker, an appropriate rhomboid flap was drawn incorporating the defect.    The area thus outlined was incised deep to adipose tissue with a #15 scalpel blade.  The skin margins were undermined to an appropriate distance in all directions utilizing iris scissors. Bi-Rhombic Flap Text: The defect edges were debeveled with a #15 scalpel blade.  Given the location of the defect and the proximity to free margins a bi-rhombic flap was deemed most appropriate.  Using a sterile surgical marker, an appropriate rhombic flap was drawn incorporating the defect. The area thus outlined was incised deep to adipose tissue with a #15 scalpel blade.  The skin margins were undermined to an appropriate distance in all directions utilizing iris scissors. Helical Rim Advancement Flap Text: The defect edges were debeveled with a #15 blade scalpel.  Given the location of the defect and the proximity to free margins (helical rim) a double helical rim advancement flap was deemed most appropriate.  Using a sterile surgical marker, the appropriate advancement flaps were drawn incorporating the defect and placing the expected incisions between the helical rim and antihelix where possible.  The area thus outlined was incised through and through with a #15 scalpel blade.  With a skin hook and iris scissors, the flaps were gently and sharply undermined and freed up. Bilateral Helical Rim Advancement Flap Text: The defect edges were debeveled with a #15 blade scalpel.  Given the location of the defect and the proximity to free margins (helical rim) a bilateral helical rim advancement flap was deemed most appropriate.  Using a sterile surgical marker, the appropriate advancement flaps were drawn incorporating the defect and placing the expected incisions between the helical rim and antihelix where possible.  The area thus outlined was incised through and through with a #15 scalpel blade.  With a skin hook and iris scissors, the flaps were gently and sharply undermined and freed up. Ear Star Wedge Flap Text: The defect edges were debeveled with a #15 blade scalpel.  Given the location of the defect and the proximity to free margins (helical rim) an ear star wedge flap was deemed most appropriate.  Using a sterile surgical marker, the appropriate flap was drawn incorporating the defect and placing the expected incisions between the helical rim and antihelix where possible.  The area thus outlined was incised through and through with a #15 scalpel blade. Banner Transposition Flap Text: The defect edges were debeveled with a #15 scalpel blade.  Given the location of the defect and the proximity to free margins a Banner transposition flap was deemed most appropriate.  Using a sterile surgical marker, an appropriate flap drawn around the defect. The area thus outlined was incised deep to adipose tissue with a #15 scalpel blade.  The skin margins were undermined to an appropriate distance in all directions utilizing iris scissors. Bilobed Flap Text: The defect edges were debeveled with a #15 scalpel blade.  Given the location of the defect and the proximity to free margins a bilobe flap was deemed most appropriate.  Using a sterile surgical marker, an appropriate bilobe flap drawn around the defect.    The area thus outlined was incised deep to adipose tissue with a #15 scalpel blade.  The skin margins were undermined to an appropriate distance in all directions utilizing iris scissors. Bilobed Transposition Flap Text: The defect edges were debeveled with a #15 scalpel blade.  Given the location of the defect and the proximity to free margins a bilobed transposition flap was deemed most appropriate.  Using a sterile surgical marker, an appropriate bilobe flap drawn around the defect.    The area thus outlined was incised deep to adipose tissue with a #15 scalpel blade.  The skin margins were undermined to an appropriate distance in all directions utilizing iris scissors. Trilobed Flap Text: The defect edges were debeveled with a #15 scalpel blade.  Given the location of the defect and the proximity to free margins a trilobed flap was deemed most appropriate.  Using a sterile surgical marker, an appropriate trilobed flap drawn around the defect.    The area thus outlined was incised deep to adipose tissue with a #15 scalpel blade.  The skin margins were undermined to an appropriate distance in all directions utilizing iris scissors. Dorsal Nasal Flap Text: The defect edges were debeveled with a #15 scalpel blade.  Given the location of the defect and the proximity to free margins a dorsal nasal flap was deemed most appropriate.  Using a sterile surgical marker, an appropriate dorsal nasal flap was drawn around the defect.    The area thus outlined was incised deep to adipose tissue with a #15 scalpel blade.  The skin margins were undermined to an appropriate distance in all directions utilizing iris scissors. Island Pedicle Flap Text: The defect edges were debeveled with a #15 scalpel blade.  Given the location of the defect, shape of the defect and the proximity to free margins an island pedicle advancement flap was deemed most appropriate.  Using a sterile surgical marker, an appropriate advancement flap was drawn incorporating the defect, outlining the appropriate donor tissue and placing the expected incisions within the relaxed skin tension lines where possible.    The area thus outlined was incised deep to adipose tissue with a #15 scalpel blade.  The skin margins were undermined to an appropriate distance in all directions around the primary defect and laterally outward around the island pedicle utilizing iris scissors.  There was minimal undermining beneath the pedicle flap. Island Pedicle Flap With Canthal Suspension Text: The defect edges were debeveled with a #15 scalpel blade.  Given the location of the defect, shape of the defect and the proximity to free margins an island pedicle advancement flap was deemed most appropriate.  Using a sterile surgical marker, an appropriate advancement flap was drawn incorporating the defect, outlining the appropriate donor tissue and placing the expected incisions within the relaxed skin tension lines where possible. The area thus outlined was incised deep to adipose tissue with a #15 scalpel blade.  The skin margins were undermined to an appropriate distance in all directions around the primary defect and laterally outward around the island pedicle utilizing iris scissors.  There was minimal undermining beneath the pedicle flap. A suspension suture was placed in the canthal tendon to prevent tension and prevent ectropion. Alar Island Pedicle Flap Text: The defect edges were debeveled with a #15 scalpel blade.  Given the location of the defect, shape of the defect and the proximity to the alar rim an island pedicle advancement flap was deemed most appropriate.  Using a sterile surgical marker, an appropriate advancement flap was drawn incorporating the defect, outlining the appropriate donor tissue and placing the expected incisions within the nasal ala running parallel to the alar rim. The area thus outlined was incised with a #15 scalpel blade.  The skin margins were undermined minimally to an appropriate distance in all directions around the primary defect and laterally outward around the island pedicle utilizing iris scissors.  There was minimal undermining beneath the pedicle flap. Double Island Pedicle Flap Text: The defect edges were debeveled with a #15 scalpel blade.  Given the location of the defect, shape of the defect and the proximity to free margins a double island pedicle advancement flap was deemed most appropriate.  Using a sterile surgical marker, an appropriate advancement flap was drawn incorporating the defect, outlining the appropriate donor tissue and placing the expected incisions within the relaxed skin tension lines where possible.    The area thus outlined was incised deep to adipose tissue with a #15 scalpel blade.  The skin margins were undermined to an appropriate distance in all directions around the primary defect and laterally outward around the island pedicle utilizing iris scissors.  There was minimal undermining beneath the pedicle flap. Island Pedicle Flap-Requiring Vessel Identification Text: The defect edges were debeveled with a #15 scalpel blade.  Given the location of the defect, shape of the defect and the proximity to free margins an island pedicle advancement flap was deemed most appropriate.  Using a sterile surgical marker, an appropriate advancement flap was drawn, based on the axial vessel mentioned above, incorporating the defect, outlining the appropriate donor tissue and placing the expected incisions within the relaxed skin tension lines where possible.    The area thus outlined was incised deep to adipose tissue with a #15 scalpel blade.  The skin margins were undermined to an appropriate distance in all directions around the primary defect and laterally outward around the island pedicle utilizing iris scissors.  There was minimal undermining beneath the pedicle flap. Keystone Flap Text: The defect edges were debeveled with a #15 scalpel blade.  Given the location of the defect, shape of the defect a keystone flap was deemed most appropriate.  Using a sterile surgical marker, an appropriate keystone flap was drawn incorporating the defect, outlining the appropriate donor tissue and placing the expected incisions within the relaxed skin tension lines where possible. The area thus outlined was incised deep to adipose tissue with a #15 scalpel blade.  The skin margins were undermined to an appropriate distance in all directions around the primary defect and laterally outward around the flap utilizing iris scissors. O-T Plasty Text: The defect edges were debeveled with a #15 scalpel blade.  Given the location of the defect, shape of the defect and the proximity to free margins an O-T plasty was deemed most appropriate.  Using a sterile surgical marker, an appropriate O-T plasty was drawn incorporating the defect and placing the expected incisions within the relaxed skin tension lines where possible.    The area thus outlined was incised deep to adipose tissue with a #15 scalpel blade.  The skin margins were undermined to an appropriate distance in all directions utilizing iris scissors. O-Z Plasty Text: The defect edges were debeveled with a #15 scalpel blade.  Given the location of the defect, shape of the defect and the proximity to free margins an O-Z plasty (double transposition flap) was deemed most appropriate.  Using a sterile surgical marker, the appropriate transposition flaps were drawn incorporating the defect and placing the expected incisions within the relaxed skin tension lines where possible.    The area thus outlined was incised deep to adipose tissue with a #15 scalpel blade.  The skin margins were undermined to an appropriate distance in all directions utilizing iris scissors.  Hemostasis was achieved with electrocautery.  The flaps were then transposed into place, one clockwise and the other counterclockwise, and anchored with interrupted buried subcutaneous sutures. Double O-Z Plasty Text: The defect edges were debeveled with a #15 scalpel blade.  Given the location of the defect, shape of the defect and the proximity to free margins a Double O-Z plasty (double transposition flap) was deemed most appropriate.  Using a sterile surgical marker, the appropriate transposition flaps were drawn incorporating the defect and placing the expected incisions within the relaxed skin tension lines where possible. The area thus outlined was incised deep to adipose tissue with a #15 scalpel blade.  The skin margins were undermined to an appropriate distance in all directions utilizing iris scissors.  Hemostasis was achieved with electrocautery.  The flaps were then transposed into place, one clockwise and the other counterclockwise, and anchored with interrupted buried subcutaneous sutures. V-Y Plasty Text: The defect edges were debeveled with a #15 scalpel blade.  Given the location of the defect, shape of the defect and the proximity to free margins an V-Y advancement flap was deemed most appropriate.  Using a sterile surgical marker, an appropriate advancement flap was drawn incorporating the defect and placing the expected incisions within the relaxed skin tension lines where possible.    The area thus outlined was incised deep to adipose tissue with a #15 scalpel blade.  The skin margins were undermined to an appropriate distance in all directions utilizing iris scissors. H Plasty Text: Given the location of the defect, shape of the defect and the proximity to free margins a H-plasty was deemed most appropriate for repair.  Using a sterile surgical marker, the appropriate advancement arms of the H-plasty were drawn incorporating the defect and placing the expected incisions within the relaxed skin tension lines where possible. The area thus outlined was incised deep to adipose tissue with a #15 scalpel blade. The skin margins were undermined to an appropriate distance in all directions utilizing iris scissors.  The opposing advancement arms were then advanced into place in opposite direction and anchored with interrupted buried subcutaneous sutures. W Plasty Text: The lesion was extirpated to the level of the fat with a #15 scalpel blade.  Given the location of the defect, shape of the defect and the proximity to free margins a W-plasty was deemed most appropriate for repair.  Using a sterile surgical marker, the appropriate transposition arms of the W-plasty were drawn incorporating the defect and placing the expected incisions within the relaxed skin tension lines where possible.    The area thus outlined was incised deep to adipose tissue with a #15 scalpel blade.  The skin margins were undermined to an appropriate distance in all directions utilizing iris scissors.  The opposing transposition arms were then transposed into place in opposite direction and anchored with interrupted buried subcutaneous sutures. Z Plasty Text: The lesion was extirpated to the level of the fat with a #15 scalpel blade.  Given the location of the defect, shape of the defect and the proximity to free margins a Z-plasty was deemed most appropriate for repair.  Using a sterile surgical marker, the appropriate transposition arms of the Z-plasty were drawn incorporating the defect and placing the expected incisions within the relaxed skin tension lines where possible.    The area thus outlined was incised deep to adipose tissue with a #15 scalpel blade.  The skin margins were undermined to an appropriate distance in all directions utilizing iris scissors.  The opposing transposition arms were then transposed into place in opposite direction and anchored with interrupted buried subcutaneous sutures. Double Z Plasty Text: The lesion was extirpated to the level of the fat with a #15 scalpel blade. Given the location of the defect, shape of the defect and the proximity to free margins a double Z-plasty was deemed most appropriate for repair. Using a sterile surgical marker, the appropriate transposition arms of the double Z-plasty were drawn incorporating the defect and placing the expected incisions within the relaxed skin tension lines where possible. The area thus outlined was incised deep to adipose tissue with a #15 scalpel blade. The skin margins were undermined to an appropriate distance in all directions utilizing iris scissors. The opposing transposition arms were then transposed and carried over into place in opposite direction and anchored with interrupted buried subcutaneous sutures. Zygomaticofacial Flap Text: Given the location of the defect, shape of the defect and the proximity to free margins a zygomaticofacial flap was deemed most appropriate for repair.  Using a sterile surgical marker, the appropriate flap was drawn incorporating the defect and placing the expected incisions within the relaxed skin tension lines where possible. The area thus outlined was incised deep to adipose tissue with a #15 scalpel blade with preservation of a vascular pedicle.  The skin margins were undermined to an appropriate distance in all directions utilizing iris scissors.  The flap was then placed into the defect and anchored with interrupted buried subcutaneous sutures. Cheek Interpolation Flap Text: A decision was made to reconstruct the defect utilizing an interpolation axial flap and a staged reconstruction.  A telfa template was made of the defect.  This telfa template was then used to outline the Cheek Interpolation flap.  The donor area for the pedicle flap was then injected with anesthesia.  The flap was excised through the skin and subcutaneous tissue down to the layer of the underlying musculature.  The interpolation flap was carefully excised within this deep plane to maintain its blood supply.  The edges of the donor site were undermined.   The donor site was closed in a primary fashion.  The pedicle was then rotated into position and sutured.  Once the tube was sutured into place, adequate blood supply was confirmed with blanching and refill.  The pedicle was then wrapped with xeroform gauze and dressed appropriately with a telfa and gauze bandage to ensure continued blood supply and protect the attached pedicle. Cheek-To-Nose Interpolation Flap Text: A decision was made to reconstruct the defect utilizing an interpolation axial flap and a staged reconstruction.  A telfa template was made of the defect.  This telfa template was then used to outline the Cheek-To-Nose Interpolation flap.  The donor area for the pedicle flap was then injected with anesthesia.  The flap was excised through the skin and subcutaneous tissue down to the layer of the underlying musculature.  The interpolation flap was carefully excised within this deep plane to maintain its blood supply.  The edges of the donor site were undermined.   The donor site was closed in a primary fashion.  The pedicle was then rotated into position and sutured.  Once the tube was sutured into place, adequate blood supply was confirmed with blanching and refill.  The pedicle was then wrapped with xeroform gauze and dressed appropriately with a telfa and gauze bandage to ensure continued blood supply and protect the attached pedicle. Interpolation Flap Text: A decision was made to reconstruct the defect utilizing an interpolation axial flap and a staged reconstruction.  A telfa template was made of the defect.  This telfa template was then used to outline the interpolation flap.  The donor area for the pedicle flap was then injected with anesthesia.  The flap was excised through the skin and subcutaneous tissue down to the layer of the underlying musculature.  The interpolation flap was carefully excised within this deep plane to maintain its blood supply.  The edges of the donor site were undermined.   The donor site was closed in a primary fashion.  The pedicle was then rotated into position and sutured.  Once the tube was sutured into place, adequate blood supply was confirmed with blanching and refill.  The pedicle was then wrapped with xeroform gauze and dressed appropriately with a telfa and gauze bandage to ensure continued blood supply and protect the attached pedicle. Melolabial Interpolation Flap Text: A decision was made to reconstruct the defect utilizing an interpolation axial flap and a staged reconstruction.  A telfa template was made of the defect.  This telfa template was then used to outline the melolabial interpolation flap.  The donor area for the pedicle flap was then injected with anesthesia.  The flap was excised through the skin and subcutaneous tissue down to the layer of the underlying musculature.  The pedicle flap was carefully excised within this deep plane to maintain its blood supply.  The edges of the donor site were undermined.   The donor site was closed in a primary fashion.  The pedicle was then rotated into position and sutured.  Once the tube was sutured into place, adequate blood supply was confirmed with blanching and refill.  The pedicle was then wrapped with xeroform gauze and dressed appropriately with a telfa and gauze bandage to ensure continued blood supply and protect the attached pedicle. Mastoid Interpolation Flap Text: A decision was made to reconstruct the defect utilizing an interpolation axial flap and a staged reconstruction.  A telfa template was made of the defect.  This telfa template was then used to outline the mastoid interpolation flap.  The donor area for the pedicle flap was then injected with anesthesia.  The flap was excised through the skin and subcutaneous tissue down to the layer of the underlying musculature.  The pedicle flap was carefully excised within this deep plane to maintain its blood supply.  The edges of the donor site were undermined.   The donor site was closed in a primary fashion.  The pedicle was then rotated into position and sutured.  Once the tube was sutured into place, adequate blood supply was confirmed with blanching and refill.  The pedicle was then wrapped with xeroform gauze and dressed appropriately with a telfa and gauze bandage to ensure continued blood supply and protect the attached pedicle. Posterior Auricular Interpolation Flap Text: A decision was made to reconstruct the defect utilizing an interpolation axial flap and a staged reconstruction.  A telfa template was made of the defect.  This telfa template was then used to outline the posterior auricular interpolation flap.  The donor area for the pedicle flap was then injected with anesthesia.  The flap was excised through the skin and subcutaneous tissue down to the layer of the underlying musculature.  The pedicle flap was carefully excised within this deep plane to maintain its blood supply.  The edges of the donor site were undermined.   The donor site was closed in a primary fashion.  The pedicle was then rotated into position and sutured.  Once the tube was sutured into place, adequate blood supply was confirmed with blanching and refill.  The pedicle was then wrapped with xeroform gauze and dressed appropriately with a telfa and gauze bandage to ensure continued blood supply and protect the attached pedicle. Paramedian Forehead Flap Text: A decision was made to reconstruct the defect utilizing an interpolation axial flap and a staged reconstruction.  A telfa template was made of the defect.  This telfa template was then used to outline the paramedian forehead pedicle flap.  The donor area for the pedicle flap was then injected with anesthesia.  The flap was excised through the skin and subcutaneous tissue down to the layer of the underlying musculature.  The pedicle flap was carefully excised within this deep plane to maintain its blood supply.  The edges of the donor site were undermined.   The donor site was closed in a primary fashion.  The pedicle was then rotated into position and sutured.  Once the tube was sutured into place, adequate blood supply was confirmed with blanching and refill.  The pedicle was then wrapped with xeroform gauze and dressed appropriately with a telfa and gauze bandage to ensure continued blood supply and protect the attached pedicle. Abbe Flap (Upper To Lower Lip) Text: The defect of the lower lip was assessed and measured.  Given the location and size of the defect, an Abbe flap was deemed most appropriate. Using a sterile surgical marker, an appropriate Abbe flap was measured and drawn on the upper lip. Local anesthesia was then infiltrated.  A scalpel was then used to incise the upper lip through and through the skin, vermilion, muscle and mucosa, leaving the flap pedicled on the opposite side.  The flap was then rotated and transferred to the lower lip defect.  The flap was then sutured into place with a three layer technique, closing the orbicularis oris muscle layer with subcutaneous buried sutures, followed by a mucosal layer and an epidermal layer. Abbe Flap (Lower To Upper Lip) Text: The defect of the upper lip was assessed and measured.  Given the location and size of the defect, an Abbe flap was deemed most appropriate. Using a sterile surgical marker, an appropriate Abbe flap was measured and drawn on the lower lip. Local anesthesia was then infiltrated. A scalpel was then used to incise the upper lip through and through the skin, vermilion, muscle and mucosa, leaving the flap pedicled on the opposite side.  The flap was then rotated and transferred to the lower lip defect.  The flap was then sutured into place with a three layer technique, closing the orbicularis oris muscle layer with subcutaneous buried sutures, followed by a mucosal layer and an epidermal layer. Estlander Flap (Upper To Lower Lip) Text: The defect of the lower lip was assessed and measured.  Given the location and size of the defect, an Estlander flap was deemed most appropriate. Using a sterile surgical marker, an appropriate Estlander flap was measured and drawn on the upper lip. Local anesthesia was then infiltrated. A scalpel was then used to incise the lateral aspect of the flap, through skin, muscle and mucosa, leaving the flap pedicled medially.  The flap was then rotated and positioned to fill the lower lip defect.  The flap was then sutured into place with a three layer technique, closing the orbicularis oris muscle layer with subcutaneous buried sutures, followed by a mucosal layer and an epidermal layer. Lip Wedge Excision Repair Text: Given the location of the defect and the proximity to free margins a full thickness wedge repair was deemed most appropriate.  Using a sterile surgical marker, the appropriate repair was drawn incorporating the defect and placing the expected incisions perpendicular to the vermilion border.  The vermilion border was also meticulously outlined to ensure appropriate reapproximation during the repair.  The area thus outlined was incised through and through with a #15 scalpel blade.  The muscularis and dermis were reaproximated with deep sutures following hemostasis. Care was taken to realign the vermilion border before proceeding with the superficial closure.  Once the vermilion was realigned the superfical and mucosal closure was finished. Ftsg Text: The defect edges were debeveled with a #15 scalpel blade.  Given the location of the defect, shape of the defect and the proximity to free margins a full thickness skin graft was deemed most appropriate.  Using a sterile surgical marker, the primary defect shape was transferred to the donor site. The area thus outlined was incised deep to adipose tissue with a #15 scalpel blade.  The harvested graft was then trimmed of adipose tissue until only dermis and epidermis was left.  The skin margins of the secondary defect were undermined to an appropriate distance in all directions utilizing iris scissors.  The secondary defect was closed with interrupted buried subcutaneous sutures.  The skin edges were then re-apposed with running  sutures.  The skin graft was then placed in the primary defect and oriented appropriately. Split-Thickness Skin Graft Text: The defect edges were debeveled with a #15 scalpel blade.  Given the location of the defect, shape of the defect and the proximity to free margins a split thickness skin graft was deemed most appropriate.  Using a sterile surgical marker, the primary defect shape was transferred to the donor site. The split thickness graft was then harvested.  The skin graft was then placed in the primary defect and oriented appropriately. Pinch Graft Text: The defect edges were debeveled with a #15 scalpel blade. Given the location of the defect, shape of the defect and the proximity to free margins a pinch graft was deemed most appropriate. Using a sterile surgical marker, the primary defect shape was transferred to the donor site. The area thus outlined was incised deep to adipose tissue with a #15 scalpel blade.  The harvested graft was then trimmed of adipose tissue until only dermis and epidermis was left. The skin margins of the secondary defect were undermined to an appropriate distance in all directions utilizing iris scissors.  The secondary defect was closed with interrupted buried subcutaneous sutures.  The skin edges were then re-apposed with running  sutures.  The skin graft was then placed in the primary defect and oriented appropriately. Burow's Graft Text: The defect edges were debeveled with a #15 scalpel blade.  Given the location of the defect, shape of the defect, the proximity to free margins and the presence of a standing cone deformity a Burow's skin graft was deemed most appropriate. The standing cone was removed and this tissue was then trimmed to the shape of the primary defect. The adipose tissue was also removed until only dermis and epidermis were left.  The skin margins of the secondary defect were undermined to an appropriate distance in all directions utilizing iris scissors.  The secondary defect was closed with interrupted buried subcutaneous sutures.  The skin edges were then re-apposed with running  sutures.  The skin graft was then placed in the primary defect and oriented appropriately. Cartilage Graft Text: The defect edges were debeveled with a #15 scalpel blade.  Given the location of the defect, shape of the defect, the fact the defect involved a full thickness cartilage defect a cartilage graft was deemed most appropriate.  An appropriate donor site was identified, cleansed, and anesthetized. The cartilage graft was then harvested and transferred to the recipient site, oriented appropriately and then sutured into place.  The secondary defect was then repaired using a primary closure. Composite Graft Text: The defect edges were debeveled with a #15 scalpel blade.  Given the location of the defect, shape of the defect, the proximity to free margins and the fact the defect was full thickness a composite graft was deemed most appropriate.  The defect was outline and then transferred to the donor site.  A full thickness graft was then excised from the donor site. The graft was then placed in the primary defect, oriented appropriately and then sutured into place.  The secondary defect was then repaired using a primary closure. Epidermal Autograft Text: The defect edges were debeveled with a #15 scalpel blade.  Given the location of the defect, shape of the defect and the proximity to free margins an epidermal autograft was deemed most appropriate.  Using a sterile surgical marker, the primary defect shape was transferred to the donor site. The epidermal graft was then harvested.  The skin graft was then placed in the primary defect and oriented appropriately. Dermal Autograft Text: The defect edges were debeveled with a #15 scalpel blade.  Given the location of the defect, shape of the defect and the proximity to free margins a dermal autograft was deemed most appropriate.  Using a sterile surgical marker, the primary defect shape was transferred to the donor site. The area thus outlined was incised deep to adipose tissue with a #15 scalpel blade.  The harvested graft was then trimmed of adipose and epidermal tissue until only dermis was left.  The skin graft was then placed in the primary defect and oriented appropriately. Skin Substitute Text: The defect edges were debeveled with a #15 scalpel blade.  Given the location of the defect, shape of the defect and the proximity to free margins a skin substitute graft was deemed most appropriate.  The graft material was trimmed to fit the size of the defect. The graft was then placed in the primary defect and oriented appropriately. Tissue Cultured Epidermal Autograft Text: The defect edges were debeveled with a #15 scalpel blade.  Given the location of the defect, shape of the defect and the proximity to free margins a tissue cultured epidermal autograft was deemed most appropriate.  The graft was then trimmed to fit the size of the defect.  The graft was then placed in the primary defect and oriented appropriately. Xenograft Text: The defect edges were debeveled with a #15 scalpel blade.  Given the location of the defect, shape of the defect and the proximity to free margins a xenograft was deemed most appropriate.  The graft was then trimmed to fit the size of the defect.  The graft was then placed in the primary defect and oriented appropriately. Purse String (Intermediate) Text: Given the location of the defect and the characteristics of the surrounding skin a purse string intermediate closure was deemed most appropriate.  Undermining was performed circumfirentially around the surgical defect.  A purse string suture was then placed and tightened. Purse String (Simple) Text: Given the location of the defect and the characteristics of the surrounding skin a purse string simple closure was deemed most appropriate.  Undermining was performed circumferentially around the surgical defect.  A purse string suture was then placed and tightened. Partial Purse String (Intermediate) Text: Given the location of the defect and the characteristics of the surrounding skin an intermediate purse string closure was deemed most appropriate.  Undermining was performed circumferentially around the surgical defect.  A purse string suture was then placed and tightened. Wound tension of the circular defect prevented complete closure of the wound. Partial Purse String (Simple) Text: Given the location of the defect and the characteristics of the surrounding skin a simple purse string closure was deemed most appropriate.  Undermining was performed circumferentially around the surgical defect.  A purse string suture was then placed and tightened. Wound tension of the circular defect prevented complete closure of the wound. Complex Repair And Single Advancement Flap Text: The defect edges were debeveled with a #15 scalpel blade.  The primary defect was closed partially with a complex linear closure.  Given the location of the remaining defect, shape of the defect and the proximity to free margins a single advancement flap was deemed most appropriate for complete closure of the defect.  Using a sterile surgical marker, an appropriate advancement flap was drawn incorporating the defect and placing the expected incisions within the relaxed skin tension lines where possible.    The area thus outlined was incised deep to adipose tissue with a #15 scalpel blade.  The skin margins were undermined to an appropriate distance in all directions utilizing iris scissors. Complex Repair And Double Advancement Flap Text: The defect edges were debeveled with a #15 scalpel blade.  The primary defect was closed partially with a complex linear closure.  Given the location of the remaining defect, shape of the defect and the proximity to free margins a double advancement flap was deemed most appropriate for complete closure of the defect.  Using a sterile surgical marker, an appropriate advancement flap was drawn incorporating the defect and placing the expected incisions within the relaxed skin tension lines where possible.    The area thus outlined was incised deep to adipose tissue with a #15 scalpel blade.  The skin margins were undermined to an appropriate distance in all directions utilizing iris scissors. Complex Repair And Modified Advancement Flap Text: The defect edges were debeveled with a #15 scalpel blade.  The primary defect was closed partially with a complex linear closure.  Given the location of the remaining defect, shape of the defect and the proximity to free margins a modified advancement flap was deemed most appropriate for complete closure of the defect.  Using a sterile surgical marker, an appropriate advancement flap was drawn incorporating the defect and placing the expected incisions within the relaxed skin tension lines where possible.    The area thus outlined was incised deep to adipose tissue with a #15 scalpel blade.  The skin margins were undermined to an appropriate distance in all directions utilizing iris scissors. Complex Repair And A-T Advancement Flap Text: The defect edges were debeveled with a #15 scalpel blade.  The primary defect was closed partially with a complex linear closure.  Given the location of the remaining defect, shape of the defect and the proximity to free margins an A-T advancement flap was deemed most appropriate for complete closure of the defect.  Using a sterile surgical marker, an appropriate advancement flap was drawn incorporating the defect and placing the expected incisions within the relaxed skin tension lines where possible.    The area thus outlined was incised deep to adipose tissue with a #15 scalpel blade.  The skin margins were undermined to an appropriate distance in all directions utilizing iris scissors. Complex Repair And O-T Advancement Flap Text: The defect edges were debeveled with a #15 scalpel blade.  The primary defect was closed partially with a complex linear closure.  Given the location of the remaining defect, shape of the defect and the proximity to free margins an O-T advancement flap was deemed most appropriate for complete closure of the defect.  Using a sterile surgical marker, an appropriate advancement flap was drawn incorporating the defect and placing the expected incisions within the relaxed skin tension lines where possible.    The area thus outlined was incised deep to adipose tissue with a #15 scalpel blade.  The skin margins were undermined to an appropriate distance in all directions utilizing iris scissors. Complex Repair And O-L Flap Text: The defect edges were debeveled with a #15 scalpel blade.  The primary defect was closed partially with a complex linear closure.  Given the location of the remaining defect, shape of the defect and the proximity to free margins an O-L flap was deemed most appropriate for complete closure of the defect.  Using a sterile surgical marker, an appropriate flap was drawn incorporating the defect and placing the expected incisions within the relaxed skin tension lines where possible.    The area thus outlined was incised deep to adipose tissue with a #15 scalpel blade.  The skin margins were undermined to an appropriate distance in all directions utilizing iris scissors. Complex Repair And Bilobe Flap Text: The defect edges were debeveled with a #15 scalpel blade.  The primary defect was closed partially with a complex linear closure.  Given the location of the remaining defect, shape of the defect and the proximity to free margins a bilobe flap was deemed most appropriate for complete closure of the defect.  Using a sterile surgical marker, an appropriate advancement flap was drawn incorporating the defect and placing the expected incisions within the relaxed skin tension lines where possible.    The area thus outlined was incised deep to adipose tissue with a #15 scalpel blade.  The skin margins were undermined to an appropriate distance in all directions utilizing iris scissors. Complex Repair And Melolabial Flap Text: The defect edges were debeveled with a #15 scalpel blade.  The primary defect was closed partially with a complex linear closure.  Given the location of the remaining defect, shape of the defect and the proximity to free margins a melolabial flap was deemed most appropriate for complete closure of the defect.  Using a sterile surgical marker, an appropriate advancement flap was drawn incorporating the defect and placing the expected incisions within the relaxed skin tension lines where possible.    The area thus outlined was incised deep to adipose tissue with a #15 scalpel blade.  The skin margins were undermined to an appropriate distance in all directions utilizing iris scissors. Complex Repair And Rotation Flap Text: The defect edges were debeveled with a #15 scalpel blade.  The primary defect was closed partially with a complex linear closure.  Given the location of the remaining defect, shape of the defect and the proximity to free margins a rotation flap was deemed most appropriate for complete closure of the defect.  Using a sterile surgical marker, an appropriate advancement flap was drawn incorporating the defect and placing the expected incisions within the relaxed skin tension lines where possible.    The area thus outlined was incised deep to adipose tissue with a #15 scalpel blade.  The skin margins were undermined to an appropriate distance in all directions utilizing iris scissors. Complex Repair And Rhombic Flap Text: The defect edges were debeveled with a #15 scalpel blade.  The primary defect was closed partially with a complex linear closure.  Given the location of the remaining defect, shape of the defect and the proximity to free margins a rhombic flap was deemed most appropriate for complete closure of the defect.  Using a sterile surgical marker, an appropriate advancement flap was drawn incorporating the defect and placing the expected incisions within the relaxed skin tension lines where possible.    The area thus outlined was incised deep to adipose tissue with a #15 scalpel blade.  The skin margins were undermined to an appropriate distance in all directions utilizing iris scissors. Complex Repair And Transposition Flap Text: The defect edges were debeveled with a #15 scalpel blade.  The primary defect was closed partially with a complex linear closure.  Given the location of the remaining defect, shape of the defect and the proximity to free margins a transposition flap was deemed most appropriate for complete closure of the defect.  Using a sterile surgical marker, an appropriate advancement flap was drawn incorporating the defect and placing the expected incisions within the relaxed skin tension lines where possible.    The area thus outlined was incised deep to adipose tissue with a #15 scalpel blade.  The skin margins were undermined to an appropriate distance in all directions utilizing iris scissors. Complex Repair And V-Y Plasty Text: The defect edges were debeveled with a #15 scalpel blade.  The primary defect was closed partially with a complex linear closure.  Given the location of the remaining defect, shape of the defect and the proximity to free margins a V-Y plasty was deemed most appropriate for complete closure of the defect.  Using a sterile surgical marker, an appropriate advancement flap was drawn incorporating the defect and placing the expected incisions within the relaxed skin tension lines where possible.    The area thus outlined was incised deep to adipose tissue with a #15 scalpel blade.  The skin margins were undermined to an appropriate distance in all directions utilizing iris scissors. Complex Repair And M Plasty Text: The defect edges were debeveled with a #15 scalpel blade.  The primary defect was closed partially with a complex linear closure.  Given the location of the remaining defect, shape of the defect and the proximity to free margins an M plasty was deemed most appropriate for complete closure of the defect.  Using a sterile surgical marker, an appropriate advancement flap was drawn incorporating the defect and placing the expected incisions within the relaxed skin tension lines where possible.    The area thus outlined was incised deep to adipose tissue with a #15 scalpel blade.  The skin margins were undermined to an appropriate distance in all directions utilizing iris scissors. Complex Repair And Double M Plasty Text: The defect edges were debeveled with a #15 scalpel blade.  The primary defect was closed partially with a complex linear closure.  Given the location of the remaining defect, shape of the defect and the proximity to free margins a double M plasty was deemed most appropriate for complete closure of the defect.  Using a sterile surgical marker, an appropriate advancement flap was drawn incorporating the defect and placing the expected incisions within the relaxed skin tension lines where possible.    The area thus outlined was incised deep to adipose tissue with a #15 scalpel blade.  The skin margins were undermined to an appropriate distance in all directions utilizing iris scissors. Complex Repair And W Plasty Text: The defect edges were debeveled with a #15 scalpel blade.  The primary defect was closed partially with a complex linear closure.  Given the location of the remaining defect, shape of the defect and the proximity to free margins a W plasty was deemed most appropriate for complete closure of the defect.  Using a sterile surgical marker, an appropriate advancement flap was drawn incorporating the defect and placing the expected incisions within the relaxed skin tension lines where possible.    The area thus outlined was incised deep to adipose tissue with a #15 scalpel blade.  The skin margins were undermined to an appropriate distance in all directions utilizing iris scissors. Complex Repair And Z Plasty Text: The defect edges were debeveled with a #15 scalpel blade.  The primary defect was closed partially with a complex linear closure.  Given the location of the remaining defect, shape of the defect and the proximity to free margins a Z plasty was deemed most appropriate for complete closure of the defect.  Using a sterile surgical marker, an appropriate advancement flap was drawn incorporating the defect and placing the expected incisions within the relaxed skin tension lines where possible.    The area thus outlined was incised deep to adipose tissue with a #15 scalpel blade.  The skin margins were undermined to an appropriate distance in all directions utilizing iris scissors. Complex Repair And Dorsal Nasal Flap Text: The defect edges were debeveled with a #15 scalpel blade.  The primary defect was closed partially with a complex linear closure.  Given the location of the remaining defect, shape of the defect and the proximity to free margins a dorsal nasal flap was deemed most appropriate for complete closure of the defect.  Using a sterile surgical marker, an appropriate flap was drawn incorporating the defect and placing the expected incisions within the relaxed skin tension lines where possible.    The area thus outlined was incised deep to adipose tissue with a #15 scalpel blade.  The skin margins were undermined to an appropriate distance in all directions utilizing iris scissors. Complex Repair And Ftsg Text: The defect edges were debeveled with a #15 scalpel blade.  The primary defect was closed partially with a complex linear closure.  Given the location of the defect, shape of the defect and the proximity to free margins a full thickness skin graft was deemed most appropriate to repair the remaining defect.  The graft was trimmed to fit the size of the remaining defect.  The graft was then placed in the primary defect, oriented appropriately, and sutured into place. Complex Repair And Burow's Graft Text: The defect edges were debeveled with a #15 scalpel blade.  The primary defect was closed partially with a complex linear closure.  Given the location of the defect, shape of the defect, the proximity to free margins and the presence of a standing cone deformity a Burow's graft was deemed most appropriate to repair the remaining defect.  The graft was trimmed to fit the size of the remaining defect.  The graft was then placed in the primary defect, oriented appropriately, and sutured into place. Complex Repair And Split-Thickness Skin Graft Text: The defect edges were debeveled with a #15 scalpel blade.  The primary defect was closed partially with a complex linear closure.  Given the location of the defect, shape of the defect and the proximity to free margins a split thickness skin graft was deemed most appropriate to repair the remaining defect.  The graft was trimmed to fit the size of the remaining defect.  The graft was then placed in the primary defect, oriented appropriately, and sutured into place. Complex Repair And Epidermal Autograft Text: The defect edges were debeveled with a #15 scalpel blade.  The primary defect was closed partially with a complex linear closure.  Given the location of the defect, shape of the defect and the proximity to free margins an epidermal autograft was deemed most appropriate to repair the remaining defect.  The graft was trimmed to fit the size of the remaining defect.  The graft was then placed in the primary defect, oriented appropriately, and sutured into place. Complex Repair And Dermal Autograft Text: The defect edges were debeveled with a #15 scalpel blade.  The primary defect was closed partially with a complex linear closure.  Given the location of the defect, shape of the defect and the proximity to free margins an dermal autograft was deemed most appropriate to repair the remaining defect.  The graft was trimmed to fit the size of the remaining defect.  The graft was then placed in the primary defect, oriented appropriately, and sutured into place. Complex Repair And Tissue Cultured Epidermal Autograft Text: The defect edges were debeveled with a #15 scalpel blade.  The primary defect was closed partially with a complex linear closure.  Given the location of the defect, shape of the defect and the proximity to free margins an tissue cultured epidermal autograft was deemed most appropriate to repair the remaining defect.  The graft was trimmed to fit the size of the remaining defect.  The graft was then placed in the primary defect, oriented appropriately, and sutured into place. Complex Repair And Xenograft Text: The defect edges were debeveled with a #15 scalpel blade.  The primary defect was closed partially with a complex linear closure.  Given the location of the defect, shape of the defect and the proximity to free margins a xenograft was deemed most appropriate to repair the remaining defect.  The graft was trimmed to fit the size of the remaining defect.  The graft was then placed in the primary defect, oriented appropriately, and sutured into place. Complex Repair And Skin Substitute Graft Text: The defect edges were debeveled with a #15 scalpel blade.  The primary defect was closed partially with a complex linear closure.  Given the location of the remaining defect, shape of the defect and the proximity to free margins a skin substitute graft was deemed most appropriate to repair the remaining defect.  The graft was trimmed to fit the size of the remaining defect.  The graft was then placed in the primary defect, oriented appropriately, and sutured into place. Consent was obtained from the patient. The risks and benefits to therapy were discussed in detail. Specifically, the risks of infection, scarring, bleeding, prolonged wound healing, incomplete removal, allergy to anesthesia, nerve injury and recurrence were addressed. Prior to the procedure, the treatment site was clearly identified and confirmed by the patient. All components of Universal Protocol/PAUSE Rule completed. Post-Care Instructions: I reviewed with the patient in detail post-care instructions. Patient is not to engage in any heavy lifting, exercise, or swimming for the next 14 days. Should the patient develop any fevers, chills, bleeding, severe pain patient will contact the office immediately.\\n\\nRecommend patient to switch to using OTC silicone scar pads 2 weeks after the excision and the patient is to use the pads every night x1 month. Home Suture Removal Text: Patient was provided a home suture removal kit and will remove their sutures at home.  If they have any questions or difficulties they will call the office. Where Do You Want The Question To Include Opioid Counseling Located?: Case Summary Tab Information: Selecting Yes will display possible errors in your note based on the variables you have selected. This validation is only offered as a suggestion for you. PLEASE NOTE THAT THE VALIDATION TEXT WILL BE REMOVED WHEN YOU FINALIZE YOUR NOTE. IF YOU WANT TO FAX A PRELIMINARY NOTE YOU WILL NEED TO TOGGLE THIS TO 'NO' IF YOU DO NOT WANT IT IN YOUR FAXED NOTE.

## 2024-03-01 ENCOUNTER — APPOINTMENT (OUTPATIENT)
Dept: ORTHOPEDIC SURGERY | Facility: CLINIC | Age: 79
End: 2024-03-01

## 2024-04-16 ENCOUNTER — EMERGENCY (EMERGENCY)
Facility: HOSPITAL | Age: 79
LOS: 0 days | Discharge: LEFT BEFORE DISCHARGE | End: 2024-04-17
Attending: EMERGENCY MEDICINE
Payer: MEDICARE

## 2024-04-16 VITALS
OXYGEN SATURATION: 99 % | TEMPERATURE: 98 F | DIASTOLIC BLOOD PRESSURE: 60 MMHG | HEIGHT: 65 IN | SYSTOLIC BLOOD PRESSURE: 124 MMHG | RESPIRATION RATE: 16 BRPM | WEIGHT: 128.97 LBS | HEART RATE: 78 BPM

## 2024-04-16 DIAGNOSIS — M06.9 RHEUMATOID ARTHRITIS, UNSPECIFIED: ICD-10-CM

## 2024-04-16 DIAGNOSIS — Z98.41 CATARACT EXTRACTION STATUS, RIGHT EYE: Chronic | ICD-10-CM

## 2024-04-16 DIAGNOSIS — Z88.6 ALLERGY STATUS TO ANALGESIC AGENT: ICD-10-CM

## 2024-04-16 DIAGNOSIS — R42 DIZZINESS AND GIDDINESS: ICD-10-CM

## 2024-04-16 DIAGNOSIS — Z96.642 PRESENCE OF LEFT ARTIFICIAL HIP JOINT: ICD-10-CM

## 2024-04-16 DIAGNOSIS — Z53.29 PROCEDURE AND TREATMENT NOT CARRIED OUT BECAUSE OF PATIENT'S DECISION FOR OTHER REASONS: ICD-10-CM

## 2024-04-16 DIAGNOSIS — R26.81 UNSTEADINESS ON FEET: ICD-10-CM

## 2024-04-16 DIAGNOSIS — N83.209 UNSPECIFIED OVARIAN CYST, UNSPECIFIED SIDE: Chronic | ICD-10-CM

## 2024-04-16 DIAGNOSIS — Z98.890 OTHER SPECIFIED POSTPROCEDURAL STATES: Chronic | ICD-10-CM

## 2024-04-16 DIAGNOSIS — Z96.641 PRESENCE OF RIGHT ARTIFICIAL HIP JOINT: Chronic | ICD-10-CM

## 2024-04-16 DIAGNOSIS — Z90.49 ACQUIRED ABSENCE OF OTHER SPECIFIED PARTS OF DIGESTIVE TRACT: Chronic | ICD-10-CM

## 2024-04-16 LAB
ALBUMIN SERPL ELPH-MCNC: 3.7 G/DL — SIGNIFICANT CHANGE UP (ref 3.5–5.2)
ALP SERPL-CCNC: 78 U/L — SIGNIFICANT CHANGE UP (ref 30–115)
ALT FLD-CCNC: 9 U/L — SIGNIFICANT CHANGE UP (ref 0–41)
ANION GAP SERPL CALC-SCNC: 11 MMOL/L — SIGNIFICANT CHANGE UP (ref 7–14)
APTT BLD: 27.8 SEC — SIGNIFICANT CHANGE UP (ref 27–39.2)
AST SERPL-CCNC: 14 U/L — SIGNIFICANT CHANGE UP (ref 0–41)
BASOPHILS # BLD AUTO: 0.01 K/UL — SIGNIFICANT CHANGE UP (ref 0–0.2)
BASOPHILS NFR BLD AUTO: 0.2 % — SIGNIFICANT CHANGE UP (ref 0–1)
BILIRUB SERPL-MCNC: 0.3 MG/DL — SIGNIFICANT CHANGE UP (ref 0.2–1.2)
BUN SERPL-MCNC: 21 MG/DL — HIGH (ref 10–20)
CALCIUM SERPL-MCNC: 9 MG/DL — SIGNIFICANT CHANGE UP (ref 8.4–10.5)
CHLORIDE SERPL-SCNC: 108 MMOL/L — SIGNIFICANT CHANGE UP (ref 98–110)
CO2 SERPL-SCNC: 22 MMOL/L — SIGNIFICANT CHANGE UP (ref 17–32)
CREAT SERPL-MCNC: 1 MG/DL — SIGNIFICANT CHANGE UP (ref 0.7–1.5)
EGFR: 57 ML/MIN/1.73M2 — LOW
EOSINOPHIL # BLD AUTO: 0.15 K/UL — SIGNIFICANT CHANGE UP (ref 0–0.7)
EOSINOPHIL NFR BLD AUTO: 3.1 % — SIGNIFICANT CHANGE UP (ref 0–8)
GLUCOSE SERPL-MCNC: 87 MG/DL — SIGNIFICANT CHANGE UP (ref 70–99)
HCT VFR BLD CALC: 31.6 % — LOW (ref 37–47)
HGB BLD-MCNC: 10.5 G/DL — LOW (ref 12–16)
IMM GRANULOCYTES NFR BLD AUTO: 0.2 % — SIGNIFICANT CHANGE UP (ref 0.1–0.3)
INR BLD: 1.23 RATIO — SIGNIFICANT CHANGE UP (ref 0.65–1.3)
LYMPHOCYTES # BLD AUTO: 0.72 K/UL — LOW (ref 1.2–3.4)
LYMPHOCYTES # BLD AUTO: 15 % — LOW (ref 20.5–51.1)
MCHC RBC-ENTMCNC: 32 PG — HIGH (ref 27–31)
MCHC RBC-ENTMCNC: 33.2 G/DL — SIGNIFICANT CHANGE UP (ref 32–37)
MCV RBC AUTO: 96.3 FL — SIGNIFICANT CHANGE UP (ref 81–99)
MONOCYTES # BLD AUTO: 0.64 K/UL — HIGH (ref 0.1–0.6)
MONOCYTES NFR BLD AUTO: 13.3 % — HIGH (ref 1.7–9.3)
NEUTROPHILS # BLD AUTO: 3.28 K/UL — SIGNIFICANT CHANGE UP (ref 1.4–6.5)
NEUTROPHILS NFR BLD AUTO: 68.2 % — SIGNIFICANT CHANGE UP (ref 42.2–75.2)
NRBC # BLD: 0 /100 WBCS — SIGNIFICANT CHANGE UP (ref 0–0)
PLATELET # BLD AUTO: 147 K/UL — SIGNIFICANT CHANGE UP (ref 130–400)
PMV BLD: 12.3 FL — HIGH (ref 7.4–10.4)
POTASSIUM SERPL-MCNC: 3.9 MMOL/L — SIGNIFICANT CHANGE UP (ref 3.5–5)
POTASSIUM SERPL-SCNC: 3.9 MMOL/L — SIGNIFICANT CHANGE UP (ref 3.5–5)
PROT SERPL-MCNC: 5.8 G/DL — LOW (ref 6–8)
PROTHROM AB SERPL-ACNC: 14.1 SEC — HIGH (ref 9.95–12.87)
RBC # BLD: 3.28 M/UL — LOW (ref 4.2–5.4)
RBC # FLD: 13.3 % — SIGNIFICANT CHANGE UP (ref 11.5–14.5)
SODIUM SERPL-SCNC: 141 MMOL/L — SIGNIFICANT CHANGE UP (ref 135–146)
TROPONIN T, HIGH SENSITIVITY RESULT: 7 NG/L — SIGNIFICANT CHANGE UP (ref 6–13)
WBC # BLD: 4.81 K/UL — SIGNIFICANT CHANGE UP (ref 4.8–10.8)
WBC # FLD AUTO: 4.81 K/UL — SIGNIFICANT CHANGE UP (ref 4.8–10.8)

## 2024-04-16 PROCEDURE — 99285 EMERGENCY DEPT VISIT HI MDM: CPT

## 2024-04-16 PROCEDURE — 71045 X-RAY EXAM CHEST 1 VIEW: CPT | Mod: 26

## 2024-04-16 PROCEDURE — 85610 PROTHROMBIN TIME: CPT

## 2024-04-16 PROCEDURE — 84484 ASSAY OF TROPONIN QUANT: CPT

## 2024-04-16 PROCEDURE — 70450 CT HEAD/BRAIN W/O DYE: CPT | Mod: MC

## 2024-04-16 PROCEDURE — 70450 CT HEAD/BRAIN W/O DYE: CPT | Mod: 26,59,MC

## 2024-04-16 PROCEDURE — 93005 ELECTROCARDIOGRAM TRACING: CPT

## 2024-04-16 PROCEDURE — 70498 CT ANGIOGRAPHY NECK: CPT | Mod: 26,MC

## 2024-04-16 PROCEDURE — 85025 COMPLETE CBC W/AUTO DIFF WBC: CPT

## 2024-04-16 PROCEDURE — 80053 COMPREHEN METABOLIC PANEL: CPT

## 2024-04-16 PROCEDURE — 36415 COLL VENOUS BLD VENIPUNCTURE: CPT

## 2024-04-16 PROCEDURE — 70496 CT ANGIOGRAPHY HEAD: CPT | Mod: 26,MC

## 2024-04-16 PROCEDURE — 70498 CT ANGIOGRAPHY NECK: CPT | Mod: MC

## 2024-04-16 PROCEDURE — 70496 CT ANGIOGRAPHY HEAD: CPT | Mod: MC

## 2024-04-16 PROCEDURE — 71045 X-RAY EXAM CHEST 1 VIEW: CPT

## 2024-04-16 PROCEDURE — 99285 EMERGENCY DEPT VISIT HI MDM: CPT | Mod: 25

## 2024-04-16 PROCEDURE — 85730 THROMBOPLASTIN TIME PARTIAL: CPT

## 2024-04-16 PROCEDURE — 93010 ELECTROCARDIOGRAM REPORT: CPT

## 2024-04-16 RX ORDER — SODIUM CHLORIDE 9 MG/ML
1000 INJECTION INTRAMUSCULAR; INTRAVENOUS; SUBCUTANEOUS ONCE
Refills: 0 | Status: COMPLETED | OUTPATIENT
Start: 2024-04-16 | End: 2024-04-16

## 2024-04-16 RX ADMIN — SODIUM CHLORIDE 2000 MILLILITER(S): 9 INJECTION INTRAMUSCULAR; INTRAVENOUS; SUBCUTANEOUS at 18:42

## 2024-04-16 RX ADMIN — SODIUM CHLORIDE 1000 MILLILITER(S): 9 INJECTION INTRAMUSCULAR; INTRAVENOUS; SUBCUTANEOUS at 19:19

## 2024-04-16 NOTE — ED PROVIDER NOTE - PATIENT PORTAL LINK FT
You can access the FollowMyHealth Patient Portal offered by [facility] by registering at the following website: [email or web address]. By joining [employer]’s FollowMyHealth portal, you will also be able to view your health information using other applications (apps) compatible with our system.

## 2024-04-16 NOTE — ED PROVIDER NOTE - PHYSICAL EXAMINATION
VITAL SIGNS: I have reviewed nursing notes and confirm.  CONSTITUTIONAL: in no acute distress.  SKIN: Skin exam is warm and dry, no acute rash.  HEAD: Normocephalic; atraumatic.  EYES: PERRL, EOM intact; conjunctiva and sclera clear.  ENT: No nasal discharge; airway clear.   NECK: Supple; non tender.  CARD: S1, S2 normal; no murmurs, gallops, or rubs. Regular rate and rhythm.  RESP: No wheezes, rales or rhonchi. Speaking in full sentences.   ABD:  soft; non-distended; non-tender; No rebound or guarding. No CVA tenderness.  EXT: Normal ROM. No clubbing, cyanosis or edema.  NEURO: Alert, oriented. Grossly unremarkable. No focal deficits. steady gait

## 2024-04-16 NOTE — ED ADULT NURSE NOTE - NSFALLRISKASMT_ED_ALL_ED_DT
Vaginal drainage and odor for 2 days, no abd pain, feels pressure and burning on urination 16-Apr-2024 18:15

## 2024-04-16 NOTE — ED PROVIDER NOTE - NSFOLLOWUPINSTRUCTIONS_ED_ALL_ED_FT
Please follow up with neurology in 1-3 days     ******** Our Emergency Department Referral Coordinators will be reaching out to you in the next 24-48 hours from 9:00am to 5:00pm with a follow up appointment. Please expect a phone call from the hospital in that time frame. If you do not receive a call or if you have any questions or concerns, you can reach them at [phone] [place] [address]     Dizziness  Dizziness is a common problem. It is a feeling of unsteadiness or light-headedness. You may feel like you are about to faint. Dizziness can lead to injury if you stumble or fall. Anyone can become dizzy, but dizziness is more common in older adults. This condition can be caused by a number of things, including medicines, dehydration, or illness.    Follow these instructions at home:  Eating and drinking     Drink enough fluid to keep your urine clear or pale yellow. This helps to keep you from becoming dehydrated. Try to drink more clear fluids, such as water.  Do not drink alcohol.  Limit your caffeine intake if told to do so by your health care provider. Check ingredients and nutrition facts to see if a food or beverage contains caffeine.  Limit your salt (sodium) intake if told to do so by your health care provider. Check ingredients and nutrition facts to see if a food or beverage contains sodium.  Activity     Avoid making quick movements.  Rise slowly from chairs and steady yourself until you feel okay.  In the morning, first sit up on the side of the bed. When you feel okay, stand slowly while you hold onto something until you know that your balance is fine.  If you need to  one place for a long time, move your legs often. Tighten and relax the muscles in your legs while you are standing.  Do not drive or use heavy machinery if you feel dizzy.  Avoid bending down if you feel dizzy. Place items in your home so that they are easy for you to reach without leaning over.  Lifestyle     Do not use any products that contain nicotine or tobacco, such as cigarettes and e-cigarettes. If you need help quitting, ask your health care provider.  Try to reduce your stress level by using methods such as yoga or meditation. Talk with your health care provider if you need help to manage your stress.  General instructions     Watch your dizziness for any changes.  Take over-the-counter and prescription medicines only as told by your health care provider. Talk with your health care provider if you think that your dizziness is caused by a medicine that you are taking.  Tell a friend or a family member that you are feeling dizzy. If he or she notices any changes in your behavior, have this person call your health care provider.  Keep all follow-up visits as told by your health care provider. This is important.  Contact a health care provider if:  Your dizziness does not go away.  Your dizziness or light-headedness gets worse.  You feel nauseous.  You have reduced hearing.  You have new symptoms.  You are unsteady on your feet or you feel like the room is spinning.  Get help right away if:  You vomit or have diarrhea and are unable to eat or drink anything.  You have problems talking, walking, swallowing, or using your arms, hands, or legs.  You feel generally weak.  You are not thinking clearly or you have trouble forming sentences. It may take a friend or family member to notice this.  You have chest pain, abdominal pain, shortness of breath, or sweating.  Your vision changes.  You have any bleeding.  You have a severe headache.  You have neck pain or a stiff neck.  You have a fever.  These symptoms may represent a serious problem that is an emergency. Do not wait to see if the symptoms will go away. Get medical help right away. Call your local emergency services (911 in the [place] Do not drive yourself to the hospital.     Summary  Dizziness is a feeling of unsteadiness or light-headedness. This condition can be caused by a number of things, including medicines, dehydration, or illness.  Anyone can become dizzy, but dizziness is more common in older adults.  Drink enough fluid to keep your urine clear or pale yellow. Do not drink alcohol.  Avoid making quick movements if you feel dizzy. Monitor your dizziness for any changes.  This information is not intended to replace advice given to you by your health care provider. Make sure you discuss any questions you have with your health care provider.

## 2024-04-16 NOTE — ED PROVIDER NOTE - PROGRESS NOTE DETAILS
Pt s/o to Dr. Nava to f/u neuro eval and repeat trop. Karey:Patient states that she does not want to stay any longer and wants to go home.  Patient states she does not want repeat [place] for neuro.    Neuro at bedside states that given location of patient's aneurysm and no visual disturbance she believes that patient would be okay to follow-up outpatient neurology states she needs to speak with attending.    Spoke with patient about waiting to see neuro attendings decision and for repeat [place] the patient states she does not want to stay for either.  Plan to AMA patient with outpatient neurology follow-up

## 2024-04-16 NOTE — ED PROVIDER NOTE - OBJECTIVE STATEMENT
79-year-old female history of RA, 5.5 brain aneurysm presenting to ED for evaluation of 1 month of lightheadedness associated dizziness.  Patient states that she is also noted that she has had unsteady gait patient last had imaging back in October which showed the brain aneurysm stable.  Here in ED denies any fevers, chills, blurry vision, HA, neck pain, CP, SOB, N, V

## 2024-04-16 NOTE — ED ADULT TRIAGE NOTE - CHIEF COMPLAINT QUOTE
Patient presents to ED c/o lightheadedness x 1 month. Patient presents to ED c/o lightheadedness x 1 month. Sent by Dr. Mata.

## 2024-04-16 NOTE — ED ADULT NURSE NOTE - NSFALLHARMRISKINTERV_ED_ALL_ED
Assistance OOB with selected safe patient handling equipment if applicable/Assistance with ambulation/Communicate risk of Fall with Harm to all staff, patient, and family/Encourage patient to sit up slowly, dangle for a short time, stand at bedside before walking/Monitor gait and stability/Monitor for mental status changes and reorient to person, place, and time, as needed/Orthostatic vital signs/Provide visual cue: red socks, yellow wristband, yellow gown, etc/Reinforce activity limits and safety measures with patient and family/Use of alarms - bed, stretcher, chair and/or video monitoring/Bed in lowest position, wheels locked, appropriate side rails in place/Call bell, personal items and telephone in reach/Instruct patient to call for assistance before getting out of bed/chair/stretcher/Non-slip footwear applied when patient is off stretcher/[place] to call system/Physically safe environment - no spills, clutter or unnecessary equipment/Purposeful Proactive Rounding/Room/bathroom lighting operational, light cord in reach

## 2024-04-16 NOTE — ED PROVIDER NOTE - ATTENDING APP SHARED VISIT CONTRIBUTION OF CARE
80 y/o female with h/o RA, brain aneurysms (being monitored with outpt MRI, follows with neuro in [place]), in ER with c/o dizziness/feeling off balanced.  Symptoms have been off and on for the past month. Pt describes dizzinessw vaguely, but denies room spinning/vertigo sensation, denies syncope/near syncope.  pt states she has to hold on to wall at times for balance so she doesn't fall.  no HA.  no neck pain.  no motor weakness or paresthesias. no difficulty with speech.  earlier today she felt lightheaded and became diaphoretic.  no assoc cp/sob, no palpitations.  no abd pain.  no LE pain/swelling.  PE - nad, nc/at, eomi, perrl, op - clear, mmm, neck supple, cta b/l, no w/r/r, rrr, abd - soft, nt/nd, nabs, from x 4, no LE swellng/tenderness, A&O x 3, CN 2-12 intact, motor 5/5 b/l UE and LE, sensation intact x 4, FTN/HKS intact b/l  -check labs, CT head, CTA head/neck, neuro eval.

## 2024-04-16 NOTE — ED ADULT NURSE NOTE - NS ED NURSE RECORD ANOTHER HT AND WT
Pt is a 89 yo lady with a pmhx of HTN, NL, hypothyroid, vascular dementia per daughter who presents to the ED with upset at home health aides. Has been having issues with home health aides, saying they don't do anything and has been kicking them out of house. Daughter is concerned because she cannot care for her by herself. No physical complaints. No chest pain, no sob, no n/v/d, no dysuria, no abdominal pain. Pt calmly eating dinner in ED. Yes

## 2024-04-16 NOTE — ED ADULT NURSE NOTE - EXPLANATION OF PATIENT'S REASON FOR LEAVING
pt feeling better , wants to go home , provider at the bedside and explained pt the consequences of leaving AMA, pt AO x 4 , no SOB , denies any pain , denies dizziness , ambulates w/ steady gait

## 2024-04-17 LAB — TROPONIN T, HIGH SENSITIVITY RESULT: 12 NG/L — SIGNIFICANT CHANGE UP (ref 6–13)

## 2024-04-17 NOTE — CONSULT NOTE ADULT - ASSESSMENT
78 y/o F with PMHx of RA, left hip replacement in august 2023, and brain aneurysms following with Dr. Pizarro, presenting to ED with unsteady gait and lightheadedness exacerbated by position x 1 month that has worsened in the past 3 days. Pt follows with Dr. Pizarro for her aneurysms yearly who told her they were stable and recommended DSA in march 2023, however pt never follow up. HCT/CTA head and neck obtained in ED showing stable aneurysms since march 2023: Stable bilobed 5 mm aneurysm in the origin of the right P-comm. Stable 2 mm aneurysm arising from the left P-comm. Pt given meclizine and IVF in ED with no relief, no deficits on exam with negative HINT, SKEW, and Kailyn hallpike.     Impression: Pt with lightheadedness and unsteady gait x 1 month, worsening in the past 3 days, with stable imaging and no deficits on exam.     RECS:  -Pt has left against medical advice, however before leaving at time of consult pt was instructed to follow up with Dr. Pizarro outpatient for her aneurysms and return to ED for any visualo changes, lightheadedness, HT, elevated SBP, etc.     Discussed with Dr. Bingham

## 2024-04-17 NOTE — CONSULT NOTE ADULT - SUBJECTIVE AND OBJECTIVE BOX
Neurology Stroke Consult Note    HPI: 78 y/o F with PMHx of RA, left hip replacement in august 2023, and brain aneurysms following with Dr. Pizarro, presenting to ED with unsteady gait and lightheadedness x 1 month that has worsened in the past 3 days. Pt states she had BPPV last year which was room spinning dizzniess, however, she has been feeling lightheaded like she is about to faint for the past month, exacerbated by postional changes. Endorses that she follows with Dr. Pizarro for her aneurysms yearly who told her they were stable. Per chart review, pt was supposed to go for DSA in march 2023, however states she was unaware to do so and hasn't followed up since. /70 in ED. HCT/CTA head and neck obtained in ED showing stable aneurysms since march 2023: Stable bilobed 5 mm aneurysm in the origin of the right P-comm. Stable 2 mm aneurysm arising from the left P-comm. Pt given meclizine and IVF in ED with no relief. Pt denies any recent falls/HT, states that she monitors her BP at home and SBP always 110-120.    PAST MEDICAL & SURGICAL HISTORY:  RA (rheumatoid arthritis)      Chronic GERD      Bladder cancer      Benign ovarian cyst  RIGHT OVARY REMOVED      History of cholecystectomy      History of appendectomy      History of hip replacement, total, right  1998      H/O bilateral cataract extraction      H/O transurethral resection of bladder tumor (TURBT)          FAMILY HISTORY:  FHx: pancreatic cancer (Father)        SOCIAL HISTORY:  Denies smoking, drinking, or drug use    ROS: as per HPI     MEDICATIONS  (STANDING):    MEDICATIONS  (PRN):      Allergies    morphine (Unknown)    Intolerances        Vital Signs Last 24 Hrs  T(C): 36.7 (16 Apr 2024 17:45), Max: 36.7 (16 Apr 2024 17:45)  T(F): 98.1 (16 Apr 2024 17:45), Max: 98.1 (16 Apr 2024 17:45)  HR: 78 (16 Apr 2024 17:45) (78 - 78)  BP: 124/60 (16 Apr 2024 17:45) (124/60 - 124/60)  BP(mean): --  RR: 16 (16 Apr 2024 17:45) (16 - 16)  SpO2: 99% (16 Apr 2024 17:45) (99% - 99%)    Parameters below as of 16 Apr 2024 17:45  Patient On (Oxygen Delivery Method): room air        Physical exam:  General: No acute distress, awake and alert    Neurologic:  -Mental status: Awake, alert, oriented to person, place, and time. Speech is fluent with intact naming, repetition, and comprehension, no dysarthria. Recent and remote memory intact. Follows commands. Attention/concentration intact. Fund of knowledge appropriate.  -Cranial nerves:   II: Visual fields are full to confrontation.  III, IV, VI: Extraocular movements are intact without nystagmus. Pupils equally round and reactive to light. Negative HINT/SKEW/Kailyn Hallpike   V:  Facial sensation V1-V3 equal and intact   VII: Face is symmetric with normal eye closure and smile  VIII: Hearing is bilaterally intact to finger rub  IX, X: Uvula is midline and soft palate rises symmetrically  XI: Head turning and shoulder shrug are intact.  XII: Tongue protrudes midline  Motor: Normal bulk and tone. No pronator drift. Strength bilateral upper extremity 5/5, bilateral lower extremities 5/5.  Sensation: Intact to light touch bilaterally. No neglect or extinction on double simultaneous testing.  Coordination: No dysmetria on finger-to-nose and heel-to-shin bilaterally  Reflexes: Downgoing toes bilaterally   Gait: Narrow gait and steady      LABS:                        10.5   4.81  )-----------( 147      ( 16 Apr 2024 18:47 )             31.6     04-16    141  |  108  |  21<H>  ----------------------------<  87  3.9   |  22  |  1.0    Ca    9.0      16 Apr 2024 18:47    TPro  5.8<L>  /  Alb  3.7  /  TBili  0.3  /  DBili  x   /  AST  14  /  ALT  9   /  AlkPhos  78  04-16    PT/INR - ( 16 Apr 2024 21:18 )   PT: 14.10 sec;   INR: 1.23 ratio         PTT - ( 16 Apr 2024 21:18 )  PTT:27.8 sec  Urinalysis Basic - ( 16 Apr 2024 18:47 )    Color: x / Appearance: x / SG: x / pH: x  Gluc: 87 mg/dL / Ketone: x  / Bili: x / Urobili: x   Blood: x / Protein: x / Nitrite: x   Leuk Esterase: x / RBC: x / WBC x   Sq Epi: x / Non Sq Epi: x / Bacteria: x        RADIOLOGY & ADDITIONAL TESTS:  < from: CT Head No Cont (04.16.24 @ 19:46) >  IMPRESSION:    CT HEAD:  No acute intracranial pathology.    Stable moderate chronic microvascular ischemic changes.    CTA HEAD/NECK:  Stable bilobed 5 mm aneurysm in the origin of the right P-comm.    Stable 2 mm aneurysm arising from the left P-comm.    No large vessel occlusion or high-grade stenosis.    < end of copied text >

## 2024-04-24 ENCOUNTER — LABORATORY RESULT (OUTPATIENT)
Age: 79
End: 2024-04-24

## 2024-04-24 ENCOUNTER — APPOINTMENT (OUTPATIENT)
Dept: PULMONOLOGY | Facility: CLINIC | Age: 79
End: 2024-04-24
Payer: MEDICARE

## 2024-04-24 VITALS
RESPIRATION RATE: 14 BRPM | HEART RATE: 81 BPM | OXYGEN SATURATION: 95 % | SYSTOLIC BLOOD PRESSURE: 120 MMHG | DIASTOLIC BLOOD PRESSURE: 76 MMHG | HEIGHT: 65 IN | WEIGHT: 133 LBS | BODY MASS INDEX: 22.16 KG/M2

## 2024-04-24 DIAGNOSIS — Z87.891 PERSONAL HISTORY OF NICOTINE DEPENDENCE: ICD-10-CM

## 2024-04-24 DIAGNOSIS — Z80.0 FAMILY HISTORY OF MALIGNANT NEOPLASM OF DIGESTIVE ORGANS: ICD-10-CM

## 2024-04-24 PROCEDURE — 99204 OFFICE O/P NEW MOD 45 MIN: CPT

## 2024-04-24 NOTE — HISTORY OF PRESENT ILLNESS
[TextBox_4] : 79 years old presented for above was seen in the presence of her daughter well to the hospital for lightheadedness.  Underwent stroke workup CT of the neck show evidence for severe emphysema was referred.  Longtime smoker more than 50-pack-year stopped 7 years ago she saw pulmonary years ago and she was told she had COPD and was given inhaler.  She reports yearly bronchitis.  No hospitalization for COPD before.  Intermittent cough and wheezing when she is sick.  Unlimited ADLs however due to hip surgery she had a problem walking.  She has history of rheumatoid arthritis on methotrexate for the last 4 to 5 years

## 2024-04-24 NOTE — DISCUSSION/SUMMARY
[FreeTextEntry1] : - COPD ex heavy smoker stopped 7 years ago more than 50 pack/ y CT neck reviewed Unlimited ADL Plan PFTs Chest CT screening Hospital record was reviewed Daughter was present present to call me after testing

## 2024-04-30 ENCOUNTER — APPOINTMENT (OUTPATIENT)
Dept: PULMONOLOGY | Facility: HOSPITAL | Age: 79
End: 2024-04-30
Payer: MEDICARE

## 2024-04-30 ENCOUNTER — OUTPATIENT (OUTPATIENT)
Dept: OUTPATIENT SERVICES | Facility: HOSPITAL | Age: 79
LOS: 1 days | End: 2024-04-30
Payer: MEDICARE

## 2024-04-30 DIAGNOSIS — N83.209 UNSPECIFIED OVARIAN CYST, UNSPECIFIED SIDE: Chronic | ICD-10-CM

## 2024-04-30 DIAGNOSIS — Z98.890 OTHER SPECIFIED POSTPROCEDURAL STATES: Chronic | ICD-10-CM

## 2024-04-30 DIAGNOSIS — Z98.41 CATARACT EXTRACTION STATUS, RIGHT EYE: Chronic | ICD-10-CM

## 2024-04-30 DIAGNOSIS — R06.02 SHORTNESS OF BREATH: ICD-10-CM

## 2024-04-30 DIAGNOSIS — Z90.49 ACQUIRED ABSENCE OF OTHER SPECIFIED PARTS OF DIGESTIVE TRACT: Chronic | ICD-10-CM

## 2024-04-30 DIAGNOSIS — Z96.641 PRESENCE OF RIGHT ARTIFICIAL HIP JOINT: Chronic | ICD-10-CM

## 2024-04-30 PROCEDURE — 94729 DIFFUSING CAPACITY: CPT | Mod: 26

## 2024-04-30 PROCEDURE — 94727 GAS DIL/WSHOT DETER LNG VOL: CPT | Mod: 26

## 2024-04-30 PROCEDURE — 94070 EVALUATION OF WHEEZING: CPT

## 2024-04-30 PROCEDURE — 94060 EVALUATION OF WHEEZING: CPT | Mod: 26

## 2024-04-30 PROCEDURE — 94727 GAS DIL/WSHOT DETER LNG VOL: CPT

## 2024-04-30 PROCEDURE — 94664 DEMO&/EVAL PT USE INHALER: CPT

## 2024-04-30 PROCEDURE — 94729 DIFFUSING CAPACITY: CPT

## 2024-05-01 DIAGNOSIS — R06.02 SHORTNESS OF BREATH: ICD-10-CM

## 2024-05-07 NOTE — DISCHARGE NOTE PROVIDER - NSDCQMAMI_CARD_ALL_CORE
SW consulted. Patient denies concerns with etoh and drug use. Reports only using THC gummies for pain. No SW concerns indicated at this time. SW to remain available as needed. 
No

## 2024-05-09 ENCOUNTER — APPOINTMENT (OUTPATIENT)
Dept: PULMONOLOGY | Facility: CLINIC | Age: 79
End: 2024-05-09
Payer: MEDICARE

## 2024-05-09 DIAGNOSIS — J44.9 CHRONIC OBSTRUCTIVE PULMONARY DISEASE, UNSPECIFIED: ICD-10-CM

## 2024-05-09 DIAGNOSIS — J84.9 INTERSTITIAL PULMONARY DISEASE, UNSPECIFIED: ICD-10-CM

## 2024-05-09 DIAGNOSIS — I27.20 PULMONARY HYPERTENSION, UNSPECIFIED: ICD-10-CM

## 2024-05-09 DIAGNOSIS — M06.9 RHEUMATOID ARTHRITIS, UNSPECIFIED: ICD-10-CM

## 2024-05-09 PROCEDURE — 99441: CPT | Mod: 93

## 2024-05-09 NOTE — REASON FOR VISIT
[Home] : at home, [unfilled] , at the time of the visit. [Medical Office: ([facility])___] : at the medical office located in  [Follow-Up] : a follow-up visit [COPD] : COPD [Shortness of Breath] : shortness of breath

## 2024-05-09 NOTE — DISCUSSION/SUMMARY
[FreeTextEntry1] : - COPD ex heavy smoker stopped 7 years ago more than 50 pack/ y CT neck reviewed SEVERE reduction in DLCO CHEST CT HR

## 2024-06-13 ENCOUNTER — OUTPATIENT (OUTPATIENT)
Dept: OUTPATIENT SERVICES | Facility: HOSPITAL | Age: 79
LOS: 1 days | End: 2024-06-13
Payer: MEDICARE

## 2024-06-13 ENCOUNTER — RESULT REVIEW (OUTPATIENT)
Age: 79
End: 2024-06-13

## 2024-06-13 DIAGNOSIS — Z98.41 CATARACT EXTRACTION STATUS, RIGHT EYE: Chronic | ICD-10-CM

## 2024-06-13 DIAGNOSIS — Z96.641 PRESENCE OF RIGHT ARTIFICIAL HIP JOINT: Chronic | ICD-10-CM

## 2024-06-13 DIAGNOSIS — Z98.890 OTHER SPECIFIED POSTPROCEDURAL STATES: Chronic | ICD-10-CM

## 2024-06-13 DIAGNOSIS — Z90.49 ACQUIRED ABSENCE OF OTHER SPECIFIED PARTS OF DIGESTIVE TRACT: Chronic | ICD-10-CM

## 2024-06-13 DIAGNOSIS — G47.33 OBSTRUCTIVE SLEEP APNEA (ADULT) (PEDIATRIC): ICD-10-CM

## 2024-06-13 DIAGNOSIS — Z00.8 ENCOUNTER FOR OTHER GENERAL EXAMINATION: ICD-10-CM

## 2024-06-13 DIAGNOSIS — N83.209 UNSPECIFIED OVARIAN CYST, UNSPECIFIED SIDE: Chronic | ICD-10-CM

## 2024-06-13 PROCEDURE — 71250 CT THORAX DX C-: CPT | Mod: 26

## 2024-06-13 PROCEDURE — 71250 CT THORAX DX C-: CPT

## 2024-06-14 DIAGNOSIS — G47.33 OBSTRUCTIVE SLEEP APNEA (ADULT) (PEDIATRIC): ICD-10-CM

## 2024-06-26 NOTE — ED ADULT NURSE NOTE - FINAL NURSING ELECTRONIC SIGNATURE
June 26, 2024           YOUR PERSONALIZED LIST OF SERVICES & PROGRAMS           USE    Use Treatment      [facility]  [address] [place] [address] (Distance: 0.3 miles)  Phone: [phone]  Website: [email or web address]://[facility]"[email or web address]/  Language: English  Fee: Sliding scale      [facility] ([facility])  Phone: [phone]  Website: https://[employer]/programs-services/addiction-treatment/  Language: English  Hours: Mon 8:00 AM - 5:00 PM Tue 8:00 AM - 5:00 PM Wed 8:00 AM - 5:00 PM Thu 8:00 AM - 5:00 PM Fri 8:00 AM - 5:00 PM  Fee: Insurance, Self pay  Accessibility: Ada accessible      [facility]  Phone: [phone]  Website: [email or web address]  Language: English  Hours: Mon 7:00 AM - 7:00 PM Tue 7:00 AM - 7:00 PM Wed 7:00 AM - 7:00 PM Thu 7:00 AM - 7:00 PM Fri 7:00 AM - 7:00 PM  Fee: Free    Use Recovery Support      [facility] Substance Abuse [facility]  [address] [place] [address] (Distance: 0.3 miles)  Phone: [phone]  Website: https://[facility]/  Language: English  Fee: Sliding scale      in the [facility] Peer Support Network  Phone: [phone]  Website: [email or web address]/vpsnhm  Language: English  Hours: Mon 9:00 AM - 5:00 PM Tue 9:00 AM - 5:00 PM Wed 9:00 AM - 5:00 PM Thu 9:00 AM - 5:00 PM Fri 9:00 AM - 5:00 PM  Fee: Free  Accessibility: Ada accessible, Translation services      [facility]  Phone: [phone]  Website: https://[employer]/  Language: English  Hours: Mon 7:00 AM - 7:00 PM Tue 7:00 AM - 7:00 PM Wed 7:00 AM - 7:00 PM Thu 7:00 AM - 7:00 PM Fri 7:00 AM - 7:00 PM  Fee: Free               IMPORTANT NUMBERS & WEBSITES        Emergency Services  911  .   [facility]  211 [email or web address]  .   Poison Control  (252) 811-5833 http://[place]poison.org http://[place]poison.org  .     Suicide  and Crisis Lifeline  988 http://988lifeline.org  .   [facility]help [place] Child Abuse Hotline  135.274.1310 http://Childhelphotline.org   .   National Sexual Assault Hotline  (790) 357-6470 (HOPE) http://Rainn.org   .     National Runaway Safeline  (900) 676-4432 (RUNAWAY) http://[employer]  .   Pregnancy & Postpartum Support  Call/text 438-467-9477  [place]: http://ppsupport[place].org  [place]: http://psichapters.com/wi  .   Substance Abuse National Helpline ([facility])  201-924-HELP (5247) http://Findtreatment.gov   .                DISCLAIMER: These resources have been generated via the [employer] Platform. [employer] does not endorse any service providers mentioned in this resource list. [employer] does not guarantee that the services mentioned in this resource list will be available to you or will improve your health or wellness.    [facility] 01-Mar-2023 19:19

## 2024-07-11 ENCOUNTER — APPOINTMENT (OUTPATIENT)
Dept: ORTHOPEDIC SURGERY | Facility: CLINIC | Age: 79
End: 2024-07-11
Payer: MEDICARE

## 2024-07-11 DIAGNOSIS — S72.009A FRACTURE OF UNSPECIFIED PART OF NECK OF UNSPECIFIED FEMUR, INITIAL ENCOUNTER FOR CLOSED FRACTURE: ICD-10-CM

## 2024-07-11 PROCEDURE — 99213 OFFICE O/P EST LOW 20 MIN: CPT

## 2024-07-18 ENCOUNTER — APPOINTMENT (OUTPATIENT)
Dept: PAIN MANAGEMENT | Facility: CLINIC | Age: 79
End: 2024-07-18

## 2024-08-31 ENCOUNTER — EMERGENCY (EMERGENCY)
Facility: HOSPITAL | Age: 79
LOS: 0 days | Discharge: HOME | End: 2024-08-31
Attending: EMERGENCY MEDICINE
Payer: MEDICARE

## 2024-08-31 VITALS
SYSTOLIC BLOOD PRESSURE: 134 MMHG | HEART RATE: 78 BPM | RESPIRATION RATE: 19 BRPM | TEMPERATURE: 98 F | OXYGEN SATURATION: 98 % | DIASTOLIC BLOOD PRESSURE: 78 MMHG | WEIGHT: 117.95 LBS

## 2024-08-31 DIAGNOSIS — Z96.641 PRESENCE OF RIGHT ARTIFICIAL HIP JOINT: Chronic | ICD-10-CM

## 2024-08-31 DIAGNOSIS — Z87.891 PERSONAL HISTORY OF NICOTINE DEPENDENCE: ICD-10-CM

## 2024-08-31 DIAGNOSIS — Z90.49 ACQUIRED ABSENCE OF OTHER SPECIFIED PARTS OF DIGESTIVE TRACT: Chronic | ICD-10-CM

## 2024-08-31 DIAGNOSIS — Z98.41 CATARACT EXTRACTION STATUS, RIGHT EYE: Chronic | ICD-10-CM

## 2024-08-31 DIAGNOSIS — S32.2XXA FRACTURE OF COCCYX, INITIAL ENCOUNTER FOR CLOSED FRACTURE: ICD-10-CM

## 2024-08-31 DIAGNOSIS — M06.9 RHEUMATOID ARTHRITIS, UNSPECIFIED: ICD-10-CM

## 2024-08-31 DIAGNOSIS — Z88.5 ALLERGY STATUS TO NARCOTIC AGENT: ICD-10-CM
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
## 2024-08-31 DIAGNOSIS — S32.10XA UNSPECIFIED FRACTURE OF SACRUM, INITIAL ENCOUNTER FOR CLOSED FRACTURE: ICD-10-CM

## 2024-08-31 DIAGNOSIS — Z98.890 OTHER SPECIFIED POSTPROCEDURAL STATES: Chronic | ICD-10-CM

## 2024-08-31 DIAGNOSIS — N83.209 UNSPECIFIED OVARIAN CYST, UNSPECIFIED SIDE: Chronic | ICD-10-CM

## 2024-08-31 PROCEDURE — 72192 CT PELVIS W/O DYE: CPT | Mod: MC

## 2024-08-31 PROCEDURE — 72170 X-RAY EXAM OF PELVIS: CPT | Mod: 26

## 2024-08-31 PROCEDURE — 72220 X-RAY EXAM SACRUM TAILBONE: CPT

## 2024-08-31 PROCEDURE — 72192 CT PELVIS W/O DYE: CPT | Mod: 26,MC

## 2024-08-31 PROCEDURE — 72170 X-RAY EXAM OF PELVIS: CPT

## 2024-08-31 PROCEDURE — 72220 X-RAY EXAM SACRUM TAILBONE: CPT | Mod: 26

## 2024-08-31 PROCEDURE — 99285 EMERGENCY DEPT VISIT HI MDM: CPT

## 2024-08-31 PROCEDURE — 99284 EMERGENCY DEPT VISIT MOD MDM: CPT | Mod: 25

## 2024-08-31 RX ORDER — ACETAMINOPHEN 500 MG/5ML
650 LIQUID (ML) ORAL ONCE
Refills: 0 | Status: COMPLETED | OUTPATIENT
Start: 2024-08-31 | End: 2024-08-31

## 2024-08-31 RX ORDER — MELOXICAM 15 MG/1
1 TABLET ORAL
Qty: 7 | Refills: 0
Start: 2024-08-31 | End: 2024-09-06

## 2024-08-31 RX ADMIN — Medication 650 MILLIGRAM(S): at 11:18

## 2024-09-24 ENCOUNTER — TRANSCRIPTION ENCOUNTER (OUTPATIENT)
Age: 79
End: 2024-09-24

## 2024-09-27 ENCOUNTER — TRANSCRIPTION ENCOUNTER (OUTPATIENT)
Age: 79
End: 2024-09-27

## 2024-10-11 ENCOUNTER — APPOINTMENT (OUTPATIENT)
Dept: ORTHOPEDIC SURGERY | Facility: CLINIC | Age: 79
End: 2024-10-11

## 2024-11-11 ENCOUNTER — TRANSCRIPTION ENCOUNTER (OUTPATIENT)
Age: 79
End: 2024-11-11

## 2024-11-11 ENCOUNTER — OUTPATIENT (OUTPATIENT)
Dept: OUTPATIENT SERVICES | Facility: HOSPITAL | Age: 79
LOS: 1 days | End: 2024-11-11
Payer: MEDICARE

## 2024-11-11 DIAGNOSIS — Z98.890 OTHER SPECIFIED POSTPROCEDURAL STATES: Chronic | ICD-10-CM

## 2024-11-11 DIAGNOSIS — R19.7 DIARRHEA, UNSPECIFIED: ICD-10-CM

## 2024-11-11 DIAGNOSIS — Z90.49 ACQUIRED ABSENCE OF OTHER SPECIFIED PARTS OF DIGESTIVE TRACT: Chronic | ICD-10-CM

## 2024-11-11 DIAGNOSIS — Z00.8 ENCOUNTER FOR OTHER GENERAL EXAMINATION: ICD-10-CM

## 2024-11-11 DIAGNOSIS — Z96.641 PRESENCE OF RIGHT ARTIFICIAL HIP JOINT: Chronic | ICD-10-CM

## 2024-11-11 DIAGNOSIS — Z98.41 CATARACT EXTRACTION STATUS, RIGHT EYE: Chronic | ICD-10-CM

## 2024-11-11 DIAGNOSIS — N83.209 UNSPECIFIED OVARIAN CYST, UNSPECIFIED SIDE: Chronic | ICD-10-CM

## 2024-11-11 PROCEDURE — 74183 MRI ABD W/O CNTR FLWD CNTR: CPT | Mod: 26

## 2024-11-11 PROCEDURE — 74183 MRI ABD W/O CNTR FLWD CNTR: CPT

## 2024-11-11 PROCEDURE — A9579: CPT

## 2024-11-12 DIAGNOSIS — R19.7 DIARRHEA, UNSPECIFIED: ICD-10-CM

## 2025-01-08 ENCOUNTER — APPOINTMENT (OUTPATIENT)
Dept: ORTHOPEDIC SURGERY | Facility: CLINIC | Age: 80
End: 2025-01-08

## 2025-01-10 ENCOUNTER — APPOINTMENT (OUTPATIENT)
Dept: NEUROSURGERY | Facility: CLINIC | Age: 80
End: 2025-01-10

## 2025-01-29 ENCOUNTER — APPOINTMENT (OUTPATIENT)
Dept: ORTHOPEDIC SURGERY | Facility: CLINIC | Age: 80
End: 2025-01-29
Payer: MEDICARE

## 2025-01-29 PROCEDURE — 99214 OFFICE O/P EST MOD 30 MIN: CPT

## 2025-01-30 ENCOUNTER — APPOINTMENT (OUTPATIENT)
Dept: MRI IMAGING | Facility: CLINIC | Age: 80
End: 2025-01-30
Payer: MEDICARE

## 2025-01-30 PROCEDURE — 72146 MRI CHEST SPINE W/O DYE: CPT

## 2025-01-30 PROCEDURE — 72148 MRI LUMBAR SPINE W/O DYE: CPT

## 2025-02-03 ENCOUNTER — APPOINTMENT (OUTPATIENT)
Dept: ORTHOPEDIC SURGERY | Facility: CLINIC | Age: 80
End: 2025-02-03
Payer: MEDICARE

## 2025-02-03 DIAGNOSIS — S22.009A UNSPECIFIED FRACTURE OF UNSPECIFIED THORACIC VERTEBRA, INITIAL ENCOUNTER FOR CLOSED FRACTURE: ICD-10-CM

## 2025-02-03 PROCEDURE — 99213 OFFICE O/P EST LOW 20 MIN: CPT

## 2025-02-05 ENCOUNTER — APPOINTMENT (OUTPATIENT)
Dept: PAIN MANAGEMENT | Facility: CLINIC | Age: 80
End: 2025-02-05

## 2025-03-20 ENCOUNTER — APPOINTMENT (OUTPATIENT)
Dept: PAIN MANAGEMENT | Facility: CLINIC | Age: 80
End: 2025-03-20
Payer: MEDICARE

## 2025-03-20 VITALS — BODY MASS INDEX: 22.16 KG/M2 | WEIGHT: 133 LBS | HEIGHT: 65 IN

## 2025-03-20 DIAGNOSIS — M47.816 SPONDYLOSIS W/OUT MYELOPATHY OR RADICULOPATHY, LUMBAR REGION: ICD-10-CM

## 2025-03-20 DIAGNOSIS — M54.9 DORSALGIA, UNSPECIFIED: ICD-10-CM

## 2025-03-20 PROCEDURE — 99204 OFFICE O/P NEW MOD 45 MIN: CPT

## 2025-03-20 RX ORDER — METHYLPREDNISOLONE 4 MG/1
4 TABLET ORAL
Qty: 1 | Refills: 0 | Status: ACTIVE | COMMUNITY
Start: 2025-03-20 | End: 1900-01-01

## 2025-03-20 RX ORDER — GABAPENTIN 100 MG/1
100 CAPSULE ORAL 3 TIMES DAILY
Qty: 90 | Refills: 1 | Status: ACTIVE | COMMUNITY
Start: 2025-03-20 | End: 1900-01-01

## 2025-03-20 RX ORDER — MELOXICAM 15 MG/1
15 TABLET ORAL DAILY
Qty: 30 | Refills: 0 | Status: ACTIVE | COMMUNITY
Start: 2025-03-20 | End: 1900-01-01

## 2025-04-18 ENCOUNTER — APPOINTMENT (OUTPATIENT)
Dept: PAIN MANAGEMENT | Facility: CLINIC | Age: 80
End: 2025-04-18

## 2025-04-22 ENCOUNTER — EMERGENCY (EMERGENCY)
Facility: HOSPITAL | Age: 80
LOS: 0 days | Discharge: HOME | End: 2025-04-22
Attending: EMERGENCY MEDICINE
Payer: MEDICARE

## 2025-04-22 VITALS
TEMPERATURE: 98 F | WEIGHT: 132.06 LBS | OXYGEN SATURATION: 96 % | DIASTOLIC BLOOD PRESSURE: 78 MMHG | HEIGHT: 65 IN | HEART RATE: 93 BPM | SYSTOLIC BLOOD PRESSURE: 125 MMHG | RESPIRATION RATE: 18 BRPM

## 2025-04-22 DIAGNOSIS — N39.0 URINARY TRACT INFECTION, SITE NOT SPECIFIED: ICD-10-CM

## 2025-04-22 DIAGNOSIS — Z98.890 OTHER SPECIFIED POSTPROCEDURAL STATES: Chronic | ICD-10-CM

## 2025-04-22 DIAGNOSIS — Z98.41 CATARACT EXTRACTION STATUS, RIGHT EYE: Chronic | ICD-10-CM

## 2025-04-22 DIAGNOSIS — N83.209 UNSPECIFIED OVARIAN CYST, UNSPECIFIED SIDE: Chronic | ICD-10-CM

## 2025-04-22 DIAGNOSIS — R05.1 ACUTE COUGH: ICD-10-CM

## 2025-04-22 DIAGNOSIS — Z90.49 ACQUIRED ABSENCE OF OTHER SPECIFIED PARTS OF DIGESTIVE TRACT: Chronic | ICD-10-CM

## 2025-04-22 DIAGNOSIS — Z96.641 PRESENCE OF RIGHT ARTIFICIAL HIP JOINT: Chronic | ICD-10-CM

## 2025-04-22 LAB
ALBUMIN SERPL ELPH-MCNC: 3.6 G/DL — SIGNIFICANT CHANGE UP (ref 3.5–5.2)
ALP SERPL-CCNC: 77 U/L — SIGNIFICANT CHANGE UP (ref 30–115)
ALT FLD-CCNC: 9 U/L — SIGNIFICANT CHANGE UP (ref 0–41)
ANION GAP SERPL CALC-SCNC: 9 MMOL/L — SIGNIFICANT CHANGE UP (ref 7–14)
APPEARANCE UR: ABNORMAL
AST SERPL-CCNC: 17 U/L — SIGNIFICANT CHANGE UP (ref 0–41)
BACTERIA # UR AUTO: ABNORMAL /HPF
BASOPHILS # BLD AUTO: 0.02 K/UL — SIGNIFICANT CHANGE UP (ref 0–0.2)
BASOPHILS NFR BLD AUTO: 0.4 % — SIGNIFICANT CHANGE UP (ref 0–1)
BILIRUB SERPL-MCNC: 0.8 MG/DL — SIGNIFICANT CHANGE UP (ref 0.2–1.2)
BILIRUB UR-MCNC: NEGATIVE — SIGNIFICANT CHANGE UP
BUN SERPL-MCNC: 20 MG/DL — SIGNIFICANT CHANGE UP (ref 10–20)
CALCIUM SERPL-MCNC: 8.9 MG/DL — SIGNIFICANT CHANGE UP (ref 8.4–10.5)
CHLORIDE SERPL-SCNC: 108 MMOL/L — SIGNIFICANT CHANGE UP (ref 98–110)
CO2 SERPL-SCNC: 24 MMOL/L — SIGNIFICANT CHANGE UP (ref 17–32)
COLOR SPEC: YELLOW — SIGNIFICANT CHANGE UP
CREAT SERPL-MCNC: 1 MG/DL — SIGNIFICANT CHANGE UP (ref 0.7–1.5)
DIFF PNL FLD: ABNORMAL
EGFR: 57 ML/MIN/1.73M2 — LOW
EGFR: 57 ML/MIN/1.73M2 — LOW
EOSINOPHIL # BLD AUTO: 0.11 K/UL — SIGNIFICANT CHANGE UP (ref 0–0.7)
EOSINOPHIL NFR BLD AUTO: 2.4 % — SIGNIFICANT CHANGE UP (ref 0–8)
EPI CELLS # UR: PRESENT
FLUAV AG NPH QL: SIGNIFICANT CHANGE UP
FLUBV AG NPH QL: SIGNIFICANT CHANGE UP
GLUCOSE SERPL-MCNC: 101 MG/DL — HIGH (ref 70–99)
GLUCOSE UR QL: NEGATIVE MG/DL — SIGNIFICANT CHANGE UP
HCT VFR BLD CALC: 33.2 % — LOW (ref 37–47)
HGB BLD-MCNC: 10.6 G/DL — LOW (ref 12–16)
IMM GRANULOCYTES NFR BLD AUTO: 0.4 % — HIGH (ref 0.1–0.3)
KETONES UR-MCNC: NEGATIVE MG/DL — SIGNIFICANT CHANGE UP
LEUKOCYTE ESTERASE UR-ACNC: ABNORMAL
LYMPHOCYTES # BLD AUTO: 0.81 K/UL — LOW (ref 1.2–3.4)
LYMPHOCYTES # BLD AUTO: 17.3 % — LOW (ref 20.5–51.1)
MAGNESIUM SERPL-MCNC: 1.9 MG/DL — SIGNIFICANT CHANGE UP (ref 1.8–2.4)
MCHC RBC-ENTMCNC: 31.9 G/DL — LOW (ref 32–37)
MCHC RBC-ENTMCNC: 32 PG — HIGH (ref 27–31)
MCV RBC AUTO: 100.3 FL — HIGH (ref 81–99)
MONOCYTES # BLD AUTO: 0.63 K/UL — HIGH (ref 0.1–0.6)
MONOCYTES NFR BLD AUTO: 13.5 % — HIGH (ref 1.7–9.3)
NEUTROPHILS # BLD AUTO: 3.08 K/UL — SIGNIFICANT CHANGE UP (ref 1.4–6.5)
NEUTROPHILS NFR BLD AUTO: 66 % — SIGNIFICANT CHANGE UP (ref 42.2–75.2)
NITRITE UR-MCNC: POSITIVE
NRBC BLD AUTO-RTO: 0 /100 WBCS — SIGNIFICANT CHANGE UP (ref 0–0)
PH UR: 6 — SIGNIFICANT CHANGE UP (ref 5–8)
PLATELET # BLD AUTO: 184 K/UL — SIGNIFICANT CHANGE UP (ref 130–400)
PMV BLD: 11.3 FL — HIGH (ref 7.4–10.4)
POTASSIUM SERPL-MCNC: 4.1 MMOL/L — SIGNIFICANT CHANGE UP (ref 3.5–5)
POTASSIUM SERPL-SCNC: 4.1 MMOL/L — SIGNIFICANT CHANGE UP (ref 3.5–5)
PROT SERPL-MCNC: 5.9 G/DL — LOW (ref 6–8)
PROT UR-MCNC: 30 MG/DL
RBC # BLD: 3.31 M/UL — LOW (ref 4.2–5.4)
RBC # FLD: 13.9 % — SIGNIFICANT CHANGE UP (ref 11.5–14.5)
RBC CASTS # UR COMP ASSIST: 25 /HPF — HIGH (ref 0–4)
RSV RNA NPH QL NAA+NON-PROBE: SIGNIFICANT CHANGE UP
SARS-COV-2 RNA SPEC QL NAA+PROBE: SIGNIFICANT CHANGE UP
SODIUM SERPL-SCNC: 141 MMOL/L — SIGNIFICANT CHANGE UP (ref 135–146)
SOURCE RESPIRATORY: SIGNIFICANT CHANGE UP
SP GR SPEC: 1.03 — SIGNIFICANT CHANGE UP (ref 1–1.03)
UROBILINOGEN FLD QL: 1 MG/DL — SIGNIFICANT CHANGE UP (ref 0.2–1)
WBC # BLD: 4.67 K/UL — LOW (ref 4.8–10.8)
WBC # FLD AUTO: 4.67 K/UL — LOW (ref 4.8–10.8)
WBC UR QL: 40 /HPF — HIGH (ref 0–5)

## 2025-04-22 PROCEDURE — 83735 ASSAY OF MAGNESIUM: CPT

## 2025-04-22 PROCEDURE — 87086 URINE CULTURE/COLONY COUNT: CPT

## 2025-04-22 PROCEDURE — 87186 SC STD MICRODIL/AGAR DIL: CPT

## 2025-04-22 PROCEDURE — 36415 COLL VENOUS BLD VENIPUNCTURE: CPT

## 2025-04-22 PROCEDURE — 71046 X-RAY EXAM CHEST 2 VIEWS: CPT | Mod: 26

## 2025-04-22 PROCEDURE — 99284 EMERGENCY DEPT VISIT MOD MDM: CPT

## 2025-04-22 PROCEDURE — 0241U: CPT

## 2025-04-22 PROCEDURE — 85025 COMPLETE CBC W/AUTO DIFF WBC: CPT

## 2025-04-22 PROCEDURE — 80053 COMPREHEN METABOLIC PANEL: CPT

## 2025-04-22 PROCEDURE — 96374 THER/PROPH/DIAG INJ IV PUSH: CPT

## 2025-04-22 PROCEDURE — 81001 URINALYSIS AUTO W/SCOPE: CPT

## 2025-04-22 PROCEDURE — 99284 EMERGENCY DEPT VISIT MOD MDM: CPT | Mod: 25

## 2025-04-22 PROCEDURE — 71046 X-RAY EXAM CHEST 2 VIEWS: CPT

## 2025-04-22 RX ORDER — CEFPODOXIME PROXETIL 200 MG/1
200 TABLET, FILM COATED ORAL ONCE
Refills: 0 | Status: DISCONTINUED | OUTPATIENT
Start: 2025-04-22 | End: 2025-04-22

## 2025-04-22 RX ORDER — CEFPODOXIME PROXETIL 200 MG/1
1 TABLET, FILM COATED ORAL
Qty: 14 | Refills: 0
Start: 2025-04-22 | End: 2025-04-28

## 2025-04-22 RX ORDER — CEFTRIAXONE 500 MG/1
1000 INJECTION, POWDER, FOR SOLUTION INTRAMUSCULAR; INTRAVENOUS ONCE
Refills: 0 | Status: COMPLETED | OUTPATIENT
Start: 2025-04-22 | End: 2025-04-22

## 2025-04-22 RX ADMIN — Medication 2000 MILLILITER(S): at 21:24

## 2025-04-22 RX ADMIN — CEFTRIAXONE 100 MILLIGRAM(S): 500 INJECTION, POWDER, FOR SOLUTION INTRAMUSCULAR; INTRAVENOUS at 21:24

## 2025-04-22 NOTE — ED ADULT NURSE NOTE - NSFALLUNIVINTERV_ED_ALL_ED
Bed/Stretcher in lowest position, wheels locked, appropriate side rails in place/Call bell, personal items and telephone in reach/Instruct patient to call for assistance before getting out of bed/chair/stretcher/Non-slip footwear applied when patient is off stretcher/[place] to call system/Physically safe environment - no spills, clutter or unnecessary equipment/Purposeful proactive rounding/Room/bathroom lighting operational, light cord in reach

## 2025-04-22 NOTE — ED ADULT TRIAGE NOTE - WEIGHT METHOD
Drain removed, dressing changed. Wound looks WDL.  spoke to pt and establish his desire to use hoang at home. His address confirmed.   stated

## 2025-04-22 NOTE — ED ADULT NURSE NOTE - BEFAST BALANCE
Health Maintenance:   Healthy patient   Normal VS and BP   BMI: overweight   Encouraged to continue healthy diet and regular exercise. Importance of regular exercise and weight maintenance discussed.   Yearly flu shot recommended and given to patient.  Informational handout given.  Tdap up-to-date and other Immunizations up-to-date  Dental: Last seen by dentist 2 years ago, advised seeing dentist twice a year, once minimum  STD testing offered - ordered GC/CT, HIV, RPR, and hepatitis. Discussed importance of using barrier protection to avoid pregnancy and STIs. Will call with results.   Routine labs ordered for patient: CBC, CMP, lipid panel, A1C, TSH   -Patient is adopted no family history known  Discussed and counseled patient on smoking, alcohol, and drug use    Follow up in one year for repeat physical exam or sooner if needed      No

## 2025-04-22 NOTE — ED PROVIDER NOTE - CLINICAL SUMMARY MEDICAL DECISION MAKING FREE TEXT BOX
80-year-old female with rheumatoid arthritis on methotrexate presents with 3 weeks of dry cough no shortness of breath no chest pain no wheezing no fever.  Patient was seen by cardiologist for cough a week ago cleared as a cardiac cause for cough.  Patient was sent here by her primary Dr. Burks to rule out pneumonia.  In ED patient is well-appearing no distress no tachycardia.  .attm2  muscles clear to auscultation no pallor mucous membrane moist abdomen soft nontender no lower extremity swelling.  I  Dr Delatorre performed independent interpretation of EKG  -  nsr no stemi no acute ischemia  independent interpretation,   by myself Dr Delatorre, of CXR -  no ptx no opacity not wide  no water bottle heart  Labs reviewed significant for positive UTI with nitrates and 40 WBCs.  Antibiotics given through IV in ED and sent to pharmacy.  Patient has a follow-up with the primary doctor and pulmonary.  PPI given for possible silent reflux as potential cause of cough.  Patient to be discharged from ED in well appearing condition. Any available test results were discussed with and printed  for patient.  Verbal instructions given, including instructions to return to ED immediately for any new, worsening, or concerning symptoms. Limitations of ED work up discussed.  Patient reports understanding of above with capacity and insight. Written discharge instructions additionally given, including follow-up plan.

## 2025-04-22 NOTE — ED PROVIDER NOTE - OBJECTIVE STATEMENT
80-year-old female with rheumatoid arthritis on methotrexate presents with 3 weeks of dry cough no shortness of breath no chest pain no wheezing no fever.  Patient was seen by cardiologist for cough a week ago cleared as a cardiac cause for cough.  Patient was sent here by her primary Dr. Carmona to rule out pneumonia.

## 2025-04-22 NOTE — ED PROVIDER NOTE - PATIENT PORTAL LINK FT
You can access the FollowMyHealth Patient Portal offered by [facility] by registering at the following website: [email or web address]. By joining [employer]’s FollowMyHealth portal, you will also be able to view your health information using other applications (apps) compatible with our system.

## 2025-04-22 NOTE — ED PROVIDER NOTE - NSFOLLOWUPINSTRUCTIONS_ED_ALL_ED_FT
Follow up with your primary care doctor in 1-2 days     Urinary Tract Infection, Adult  ImageA urinary tract infection (UTI) is an infection of any part of the urinary tract, which includes the kidneys, ureters, bladder, and urethra. These organs make, store, and get rid of urine in the body. UTI can be a bladder infection (cystitis) or kidney infection (pyelonephritis).    What are the causes?  This infection may be caused by fungi, viruses, or bacteria. Bacteria are the most common cause of UTIs. This condition can also be caused by repeated incomplete emptying of the bladder during urination.    What increases the risk?  This condition is more likely to develop if:    You ignore your need to urinate or hold urine for long periods of time.  You do not empty your bladder completely during urination.  You wipe back to front after urinating or having a bowel movement, if you are female.  You are uncircumcised, if you are male.  You are constipated.  You have a urinary catheter that stays in place (indwelling).  You have a weak defense (immune) system.  You have a medical condition that affects your bowels, kidneys, or bladder.  You have diabetes.  You take antibiotic medicines frequently or for long periods of time, and the antibiotics no longer work well against certain types of infections (antibiotic resistance).  You take medicines that irritate your urinary tract.  You are exposed to chemicals that irritate your urinary tract.  You are female.    What are the signs or symptoms?  Symptoms of this condition include:    Fever.  Frequent urination or passing small amounts of urine frequently.  Needing to urinate urgently.  Pain or burning with urination.  Urine that smells bad or unusual.  Cloudy urine.  Pain in the lower abdomen or back.  Trouble urinating.  Blood in the urine.  Vomiting or being less hungry than normal.  Diarrhea or abdominal pain.  Vaginal discharge, if you are female.    How is this diagnosed?  This condition is diagnosed with a medical history and physical exam. You will also need to provide a urine sample to test your urine. Other tests may be done, including:    Blood tests.  Sexually transmitted disease (STD) testing.    If you have had more than one UTI, a cystoscopy or imaging studies may be done to determine the cause of the infections.    How is this treated?  Treatment for this condition often includes a combination of two or more of the following:    Antibiotic medicine.  Other medicines to treat less common causes of UTI.  Over-the-counter medicines to treat pain.  Drinking enough water to stay hydrated.    Follow these instructions at home:  Take over-the-counter and prescription medicines only as told by your health care provider.  If you were prescribed an antibiotic, take it as told by your health care provider. Do not stop taking the antibiotic even if you start to feel better.  Avoid alcohol, caffeine, tea, and carbonated beverages. They can irritate your bladder.  Drink enough fluid to keep your urine clear or pale yellow.  Keep all follow-up visits as told by your health care provider. This is important.  ImageMake sure to:    Empty your bladder often and completely. Do not hold urine for long periods of time.  Empty your bladder before and after sex.  Wipe from front to back after a bowel movement if you are female. Use each tissue one time when you wipe.    Contact a health care provider if:  You have back pain.  You have a fever.  You feel nauseous or vomit.  Your symptoms do not get better after 3 days.  Your symptoms go away and then return.  Get help right away if:  You have severe back pain or lower abdominal pain.  You are vomiting and cannot keep down any medicines or water.  This information is not intended to replace advice given to you by your health care provider. Make sure you discuss any questions you have with your health care provider.        Acute Cough    WHAT YOU NEED TO KNOW:    An acute cough can last up to 3 weeks. Common causes of an acute cough include a cold, allergies, or a lung infection.     DISCHARGE INSTRUCTIONS:    Return to the emergency department if:     You have trouble breathing or feel short of breath.      You cough up blood, or you see blood in your mucus.       You faint or feel weak or dizzy.       You have chest pain when you cough or take a deep breath.       You have new wheezing.     Contact your healthcare provider if:     You have a fever.       Your cough lasts longer than 4 weeks.       Your symptoms do not improve with treatment.       You have questions or concerns about your condition or care.     Medicines:     Medicines may be needed to stop the cough, decrease swelling in your airways, or help open your airways. Medicine may also be given to help you cough up mucus. Ask your healthcare provider what over-the-counter medicines you can take. If you have an infection caused by bacteria, you may need antibiotics.       Take your medicine as directed. Contact your healthcare provider if you think your medicine is not helping or if you have side effects. Tell him or her if you are allergic to any medicine. Keep a list of the medicines, vitamins, and herbs you take. Include the amounts, and when and why you take them. Bring the list or the pill bottles to follow-up visits. Carry your medicine list with you in case of an emergency.    Manage your symptoms:     Do not smoke and stay away from others who smoke. Nicotine and other chemicals in cigarettes and cigars can cause lung damage and make your cough worse. Ask your healthcare provider for information if you currently smoke and need help to quit. E-cigarettes or smokeless tobacco still contain nicotine. Talk to your healthcare provider before you use these products.       Drink extra liquids as directed. Liquids will help thin and loosen mucus so you can cough it up. Liquids will also help prevent dehydration. Examples of good liquids to drink include water, fruit juice, and broth. Do not drink liquids that contain caffeine. Caffeine can increase your risk for dehydration. Ask your healthcare provider how much liquid to drink each day.       Rest as directed. Do not do activities that make your cough worse, such as exercise.       Use a humidifier or vaporizer. Use a cool mist humidifier or a vaporizer to increase air moisture in your home. This may make it easier for you to breathe and help decrease your cough.       Eat 2 to 5 mL of honey 2 times each day. Honey can help thin mucus and decrease your cough.       Use cough drops or lozenges. These can help decrease throat irritation and your cough.     Follow up with your healthcare provider as directed: Write down your questions so you remember to ask them during your visits.        © Copyright [employer] 2019 All illustrations and images included in CareNotes are the copyrighted property of [employer] or [employer].

## 2025-07-24 NOTE — ED ADULT NURSE NOTE - NS ED NOTE  TALK SOMEONE YN
Triage Patient Outreach    Attempt # 1    Was call answered?  No.  Left voicemail to return call to Triage at [facility]. Patient has read MyChart result.     Shayna Degroot RN    
No